# Patient Record
Sex: FEMALE | Race: WHITE | NOT HISPANIC OR LATINO
[De-identification: names, ages, dates, MRNs, and addresses within clinical notes are randomized per-mention and may not be internally consistent; named-entity substitution may affect disease eponyms.]

---

## 2017-03-22 ENCOUNTER — APPOINTMENT (OUTPATIENT)
Dept: OTOLARYNGOLOGY | Facility: CLINIC | Age: 77
End: 2017-03-22

## 2017-04-05 ENCOUNTER — APPOINTMENT (OUTPATIENT)
Dept: OTOLARYNGOLOGY | Facility: CLINIC | Age: 77
End: 2017-04-05

## 2017-05-03 ENCOUNTER — APPOINTMENT (OUTPATIENT)
Dept: OTOLARYNGOLOGY | Facility: CLINIC | Age: 77
End: 2017-05-03

## 2017-05-30 ENCOUNTER — OUTPATIENT (OUTPATIENT)
Dept: OUTPATIENT SERVICES | Facility: HOSPITAL | Age: 77
LOS: 1 days | Discharge: HOME | End: 2017-05-30

## 2017-05-30 ENCOUNTER — APPOINTMENT (OUTPATIENT)
Dept: OTOLARYNGOLOGY | Facility: AMBULATORY SURGERY CENTER | Age: 77
End: 2017-05-30

## 2017-06-28 DIAGNOSIS — J38.01 PARALYSIS OF VOCAL CORDS AND LARYNX, UNILATERAL: ICD-10-CM

## 2017-06-28 DIAGNOSIS — I48.91 UNSPECIFIED ATRIAL FIBRILLATION: ICD-10-CM

## 2017-06-28 DIAGNOSIS — Z79.01 LONG TERM (CURRENT) USE OF ANTICOAGULANTS: ICD-10-CM

## 2017-06-28 DIAGNOSIS — I10 ESSENTIAL (PRIMARY) HYPERTENSION: ICD-10-CM

## 2018-03-06 ENCOUNTER — APPOINTMENT (OUTPATIENT)
Dept: CARDIOLOGY | Facility: CLINIC | Age: 78
End: 2018-03-06

## 2018-03-06 VITALS — BODY MASS INDEX: 17.83 KG/M2 | WEIGHT: 107 LBS | HEIGHT: 65 IN

## 2018-03-06 VITALS — DIASTOLIC BLOOD PRESSURE: 70 MMHG | SYSTOLIC BLOOD PRESSURE: 118 MMHG

## 2018-03-08 RX ORDER — METOPROLOL TARTRATE 50 MG/1
50 TABLET, FILM COATED ORAL
Refills: 0 | Status: ACTIVE | COMMUNITY

## 2018-03-08 RX ORDER — ASPIRIN 81 MG/1
81 TABLET, COATED ORAL
Refills: 0 | Status: ACTIVE | COMMUNITY

## 2018-04-11 ENCOUNTER — CLINICAL ADVICE (OUTPATIENT)
Age: 78
End: 2018-04-11

## 2018-04-23 ENCOUNTER — APPOINTMENT (OUTPATIENT)
Dept: CARDIOLOGY | Facility: CLINIC | Age: 78
End: 2018-04-23

## 2018-04-23 VITALS
OXYGEN SATURATION: 98 % | DIASTOLIC BLOOD PRESSURE: 84 MMHG | HEART RATE: 76 BPM | BODY MASS INDEX: 17.14 KG/M2 | WEIGHT: 103 LBS | SYSTOLIC BLOOD PRESSURE: 130 MMHG

## 2018-06-25 ENCOUNTER — APPOINTMENT (OUTPATIENT)
Dept: CARDIOLOGY | Facility: CLINIC | Age: 78
End: 2018-06-25

## 2019-03-08 ENCOUNTER — APPOINTMENT (OUTPATIENT)
Dept: CARDIOLOGY | Facility: CLINIC | Age: 79
End: 2019-03-08
Payer: MEDICARE

## 2019-03-08 VITALS
BODY MASS INDEX: 17.83 KG/M2 | DIASTOLIC BLOOD PRESSURE: 70 MMHG | SYSTOLIC BLOOD PRESSURE: 120 MMHG | HEIGHT: 65 IN | WEIGHT: 107 LBS

## 2019-03-08 DIAGNOSIS — Z78.9 OTHER SPECIFIED HEALTH STATUS: ICD-10-CM

## 2019-03-08 PROCEDURE — 99214 OFFICE O/P EST MOD 30 MIN: CPT

## 2019-03-08 RX ORDER — LISINOPRIL 2.5 MG/1
2.5 TABLET ORAL
Qty: 30 | Refills: 2 | Status: DISCONTINUED | COMMUNITY
Start: 2018-04-23 | End: 2019-03-08

## 2019-03-08 NOTE — HISTORY OF PRESENT ILLNESS
[FreeTextEntry1] : hx of paroxysmal a fib\par  s/p aaa aorta and avr\par  hx hypothyroidism\par  c/o infrequent palpitations\par  no chest pain\par  mild dyspnoea

## 2019-03-08 NOTE — PHYSICAL EXAM
[Heart Rate And Rhythm] : heart rate and rhythm were normal [Heart Sounds] : normal S1 and S2 [Arterial Pulses Normal] : the arterial pulses were normal [Edema] : no peripheral edema present [FreeTextEntry1] : syst murmur at base

## 2019-03-08 NOTE — ASSESSMENT
[FreeTextEntry1] :  hx of afib \par  hx of avr and aorti replacement\par  c./o exertional dyspnoea\par  needs repeat echo to evaluate aortic valve\par  lv function

## 2019-03-27 ENCOUNTER — APPOINTMENT (OUTPATIENT)
Dept: CARDIOLOGY | Facility: CLINIC | Age: 79
End: 2019-03-27

## 2019-05-07 ENCOUNTER — APPOINTMENT (OUTPATIENT)
Dept: CARDIOLOGY | Facility: CLINIC | Age: 79
End: 2019-05-07
Payer: MEDICARE

## 2019-05-07 VITALS
SYSTOLIC BLOOD PRESSURE: 115 MMHG | BODY MASS INDEX: 17.49 KG/M2 | WEIGHT: 105 LBS | DIASTOLIC BLOOD PRESSURE: 65 MMHG | HEIGHT: 65 IN

## 2019-05-07 DIAGNOSIS — I42.9 CARDIOMYOPATHY, UNSPECIFIED: ICD-10-CM

## 2019-05-07 PROCEDURE — 93000 ELECTROCARDIOGRAM COMPLETE: CPT

## 2019-05-07 PROCEDURE — 99213 OFFICE O/P EST LOW 20 MIN: CPT

## 2019-05-07 NOTE — HISTORY OF PRESENT ILLNESS
[FreeTextEntry1] :  pt s/pavr and aortic aneurysm sergery in [place] 5 yrs ago\par  feeling well but weak\par  no palpitations\par  meds reviewed

## 2019-05-07 NOTE — PHYSICAL EXAM
[General Appearance - Well Developed] : well developed [Well Groomed] : well groomed [Normal Appearance] : normal appearance [No Deformities] : no deformities [General Appearance - Well Nourished] : well nourished [General Appearance - In No Acute Distress] : no acute distress [Heart Rate And Rhythm] : heart rate and rhythm were normal [Edema] : no peripheral edema present

## 2019-05-07 NOTE — ASSESSMENT
[FreeTextEntry1] :  no chf\par  no arrhythmias noted\par  e k g shows nsr lahb no acute st t chages seen\par  needs abd sono to r/o aaa

## 2019-06-21 ENCOUNTER — APPOINTMENT (OUTPATIENT)
Dept: CARDIOLOGY | Facility: CLINIC | Age: 79
End: 2019-06-21

## 2019-07-03 ENCOUNTER — MEDICATION RENEWAL (OUTPATIENT)
Age: 79
End: 2019-07-03

## 2019-08-15 ENCOUNTER — APPOINTMENT (OUTPATIENT)
Dept: CARDIOLOGY | Facility: CLINIC | Age: 79
End: 2019-08-15
Payer: MEDICARE

## 2019-08-15 VITALS
WEIGHT: 101 LBS | HEIGHT: 65 IN | DIASTOLIC BLOOD PRESSURE: 70 MMHG | SYSTOLIC BLOOD PRESSURE: 120 MMHG | BODY MASS INDEX: 16.83 KG/M2

## 2019-08-15 DIAGNOSIS — I47.2 VENTRICULAR TACHYCARDIA: ICD-10-CM

## 2019-08-15 PROCEDURE — 99213 OFFICE O/P EST LOW 20 MIN: CPT

## 2019-08-15 NOTE — ASSESSMENT
[FreeTextEntry1] : cardiac ststus is stable\par  no arrhythmias noted\par  meds renewed\par  labs to be done by yazmin hernandez

## 2019-08-15 NOTE — HISTORY OF PRESENT ILLNESS
[FreeTextEntry1] : pt is feeling better\par  no chest pains\par  no palpitations\par  meds reviewed\par  echo done few weeks ago reviewed with pt normal lvef and prosthetic aortic vave functioning well

## 2019-08-15 NOTE — PHYSICAL EXAM
[General Appearance - Well Developed] : well developed [Normal Appearance] : normal appearance [General Appearance - Well Nourished] : well nourished [Well Groomed] : well groomed [No Deformities] : no deformities [General Appearance - In No Acute Distress] : no acute distress [Heart Rate And Rhythm] : heart rate and rhythm were normal [Veins - Varicosity Changes] : no varicosital changes were noted in the lower extremities [Edema] : no peripheral edema present [FreeTextEntry1] : syst murmur at base radiates to apex

## 2019-11-26 ENCOUNTER — RX RENEWAL (OUTPATIENT)
Age: 79
End: 2019-11-26

## 2020-01-09 ENCOUNTER — APPOINTMENT (OUTPATIENT)
Dept: CARDIOLOGY | Facility: CLINIC | Age: 80
End: 2020-01-09
Payer: MEDICARE

## 2020-01-09 VITALS
HEART RATE: 60 BPM | SYSTOLIC BLOOD PRESSURE: 138 MMHG | BODY MASS INDEX: 16.83 KG/M2 | HEIGHT: 65 IN | WEIGHT: 101 LBS | DIASTOLIC BLOOD PRESSURE: 80 MMHG

## 2020-01-09 PROCEDURE — 99213 OFFICE O/P EST LOW 20 MIN: CPT

## 2020-01-09 PROCEDURE — 93000 ELECTROCARDIOGRAM COMPLETE: CPT

## 2020-01-09 RX ORDER — ROSUVASTATIN CALCIUM 10 MG/1
10 TABLET, FILM COATED ORAL DAILY
Qty: 90 | Refills: 3 | Status: ACTIVE | COMMUNITY
Start: 2020-01-09 | End: 1900-01-01

## 2020-01-09 NOTE — PHYSICAL EXAM
[General Appearance - Well Developed] : well developed [Normal Appearance] : normal appearance [General Appearance - Well Nourished] : well nourished [Well Groomed] : well groomed [General Appearance - In No Acute Distress] : no acute distress [No Deformities] : no deformities [Heart Rate And Rhythm] : heart rate and rhythm were normal [Heart Sounds] : normal S1 and S2 [FreeTextEntry1] : syst murmur at base and apex

## 2020-01-09 NOTE — ASSESSMENT
[FreeTextEntry1] :  s/p aotoplasty and avr \par  will get echo\par   e k g nsr lad no arrhythmias noted\par  need blood work

## 2020-01-09 NOTE — HISTORY OF PRESENT ILLNESS
[FreeTextEntry1] : pt is feeling better\par  no chest pains\par  no palpitations\par  no exertional dyspnoea\par  meds reviewed

## 2020-01-23 ENCOUNTER — APPOINTMENT (OUTPATIENT)
Dept: CARDIOLOGY | Facility: CLINIC | Age: 80
End: 2020-01-23
Payer: MEDICARE

## 2020-01-23 PROCEDURE — 93306 TTE W/DOPPLER COMPLETE: CPT

## 2020-01-27 ENCOUNTER — APPOINTMENT (OUTPATIENT)
Dept: NEUROLOGY | Facility: CLINIC | Age: 80
End: 2020-01-27
Payer: MEDICARE

## 2020-01-27 VITALS
DIASTOLIC BLOOD PRESSURE: 82 MMHG | SYSTOLIC BLOOD PRESSURE: 146 MMHG | HEIGHT: 66 IN | HEART RATE: 64 BPM | BODY MASS INDEX: 15.93 KG/M2 | WEIGHT: 99.1 LBS | TEMPERATURE: 97.2 F | OXYGEN SATURATION: 98 %

## 2020-01-27 DIAGNOSIS — R41.3 OTHER AMNESIA: ICD-10-CM

## 2020-01-27 PROCEDURE — 99204 OFFICE O/P NEW MOD 45 MIN: CPT

## 2020-01-27 NOTE — ASSESSMENT
[FreeTextEntry1] : 80 yo RHF w/ h/o HTN, CAD, DLD, hypothyroidism currently with episodes of repetition here for cognitive evaluation.  Pt currently nonfocal with normal MOCA score.  Suspect episodes of repetition likely due to distraction since needs multiple attempts on 5 object recall during MOCA testing.  No obvious dementia at this time but will do workup for reversible causes. \par \par Plan:\par - MRI Brain NC\par - check b/w\par - if w/u negative, may f/u in 6 months\par - will consider repeat MOCA in 12 months

## 2020-01-27 NOTE — PHYSICAL EXAM
[FreeTextEntry1] : Physical examination:  \par General:   The patient is pleasant, cooperative, well dressed and in no acute distress.  Appearance is consistent with chronologic age.  No abnormal facies.\par Neurologic examination:  The patient is oriented to person, place, time and date.   Remote and recent memory is normal.   Fund of knowledge is intact and normal.  Language with normal repetition, comprehension and naming.  Nondysarthric.   \par Cranial nerves examination: intact VA, VFF.  EOMI w/o nystagmus, skew or reported double vision.  PERRL.  No ptosis/weakness of eyelid closure.  Facial sensation is normal with normal bite.  No facial asymmetry.  Hearing grossly intact b/l.  Palate elevates midline.  Neck flexion/extension, SCM/Trap strength normal.  Tongue midline with full resistance.  \par Motor examination:   Normal tone, bulk and range of motion.  No tenderness, twitching, tremors or involuntary movements.      \par Formal Muscle Strength Testing: (MRC grade R/L) 5/5 RON, BB, TR, WF, WE, FF, FE; 5/5 ILP, QDS, HS, DF, PF.  Arises from sitting/squatting wnl.  Toe/heel walks.  \par Reflexes:   2+ b/l pectoralis, biceps, triceps, brachioradialis, patella and Achilles.  Plantar response downgoing b/l.  Jaw jerk, Justin, clonus absent.  \par Sensory examination:   Intact to light touch and pinprick, pain, temperature and proprioception and vibration in all extremities.    \par Cerebellum:   FTN/HKS intact with normal LOUISE in all limbs.   Gait is narrow based and normal with normal tandem.  Romberg (-).  No dysmetria or dysdiadokinesia.       \par \par MOCA 26 (w/ repeated testing for 5 object recall)

## 2020-01-27 NOTE — HISTORY OF PRESENT ILLNESS
[FreeTextEntry1] : This is a 80 yo RHF accompanied by her friend presenting with forgetfulness noticed by her daughter.  According to her friend, family has noted that she has been repeating herself lately over the last few months.  Denies any forgetfulness with names or appointments.  Continues to pay bills and no decline in ADLs.  Doesn't get lost in neighborhoods or while driving.  Has not had recent MVA.  Is otherwise in her usual state of health.  Pt denies any problems with memory or function but has noted occasional repetition in though process.  Denies any hearing problems.  No weakness/numbness or focal deficits.

## 2020-05-30 ENCOUNTER — RX RENEWAL (OUTPATIENT)
Age: 80
End: 2020-05-30

## 2020-06-03 RX ORDER — LEVOTHYROXINE SODIUM 0.05 MG/1
50 TABLET ORAL DAILY
Qty: 90 | Refills: 2 | Status: ACTIVE | COMMUNITY
Start: 2020-06-03 | End: 1900-01-01

## 2020-06-04 ENCOUNTER — APPOINTMENT (OUTPATIENT)
Dept: CARDIOLOGY | Facility: CLINIC | Age: 80
End: 2020-06-04
Payer: MEDICARE

## 2020-06-04 PROCEDURE — 99441: CPT | Mod: 95

## 2020-06-04 NOTE — HISTORY OF PRESENT ILLNESS
[FreeTextEntry1] : pt called her at home and gave consent for telephone visit due to covid 19\par  pt s/p avr and aneurysm surgery\par  pt is c/o weakness but denies any chest pains,dyspnoea or palpitations\par  meds reviewed with pt and meds renwewed yesterday\par  no chf  sx\par

## 2020-06-04 NOTE — ASSESSMENT
[FreeTextEntry1] : pt is feeling better\par  cardiac status is stable\par  meds renewed\par  total time spent was 10 mnts

## 2020-06-15 ENCOUNTER — RX RENEWAL (OUTPATIENT)
Age: 80
End: 2020-06-15

## 2020-08-12 ENCOUNTER — APPOINTMENT (OUTPATIENT)
Dept: NEUROLOGY | Facility: CLINIC | Age: 80
End: 2020-08-12

## 2020-11-03 ENCOUNTER — RX RENEWAL (OUTPATIENT)
Age: 80
End: 2020-11-03

## 2020-11-12 ENCOUNTER — APPOINTMENT (OUTPATIENT)
Dept: CARDIOLOGY | Facility: CLINIC | Age: 80
End: 2020-11-12
Payer: MEDICARE

## 2020-11-12 VITALS
BODY MASS INDEX: 15.33 KG/M2 | SYSTOLIC BLOOD PRESSURE: 140 MMHG | DIASTOLIC BLOOD PRESSURE: 65 MMHG | WEIGHT: 95 LBS | TEMPERATURE: 96.1 F

## 2020-11-12 LAB
ALBUMIN SERPL ELPH-MCNC: 4.5 G/DL
ALP BLD-CCNC: 64 U/L
ALT SERPL-CCNC: 8 U/L
ANION GAP SERPL CALC-SCNC: 12 MMOL/L
AST SERPL-CCNC: 16 U/L
BASOPHILS # BLD AUTO: 0.05 K/UL
BASOPHILS NFR BLD AUTO: 0.7 %
BILIRUB SERPL-MCNC: 0.5 MG/DL
BUN SERPL-MCNC: 12 MG/DL
CALCIUM SERPL-MCNC: 9.1 MG/DL
CHLORIDE SERPL-SCNC: 101 MMOL/L
CHOLEST SERPL-MCNC: 215 MG/DL
CO2 SERPL-SCNC: 30 MMOL/L
CREAT SERPL-MCNC: 0.6 MG/DL
EOSINOPHIL # BLD AUTO: 0.32 K/UL
EOSINOPHIL NFR BLD AUTO: 4.5 %
GLUCOSE SERPL-MCNC: 81 MG/DL
HCT VFR BLD CALC: 44.1 %
HDLC SERPL-MCNC: 75 MG/DL
HGB BLD-MCNC: 13.9 G/DL
IMM GRANULOCYTES NFR BLD AUTO: 0.1 %
LDLC SERPL CALC-MCNC: 127 MG/DL
LYMPHOCYTES # BLD AUTO: 1.28 K/UL
LYMPHOCYTES NFR BLD AUTO: 18.1 %
MAN DIFF?: NORMAL
MCHC RBC-ENTMCNC: 30.5 PG
MCHC RBC-ENTMCNC: 31.5 G/DL
MCV RBC AUTO: 96.7 FL
MONOCYTES # BLD AUTO: 0.55 K/UL
MONOCYTES NFR BLD AUTO: 7.8 %
NEUTROPHILS # BLD AUTO: 4.88 K/UL
NEUTROPHILS NFR BLD AUTO: 68.8 %
NONHDLC SERPL-MCNC: 140 MG/DL
PLATELET # BLD AUTO: 229 K/UL
POTASSIUM SERPL-SCNC: 4.2 MMOL/L
PROT SERPL-MCNC: 6.9 G/DL
RBC # BLD: 4.56 M/UL
RBC # FLD: 13.5 %
SODIUM SERPL-SCNC: 143 MMOL/L
TRIGL SERPL-MCNC: 89 MG/DL
WBC # FLD AUTO: 7.09 K/UL

## 2020-11-12 PROCEDURE — 99213 OFFICE O/P EST LOW 20 MIN: CPT

## 2020-11-12 PROCEDURE — 93000 ELECTROCARDIOGRAM COMPLETE: CPT

## 2020-11-12 RX ORDER — TRIAMCINOLONE ACETONIDE 0.5 MG/G
0.05 OINTMENT TOPICAL TWICE DAILY
Qty: 1 | Refills: 3 | Status: ACTIVE | COMMUNITY
Start: 2020-11-12 | End: 1900-01-01

## 2020-11-12 NOTE — ASSESSMENT
[FreeTextEntry1] : marshall gonzales shows nsr occ p v c s noted\par \par  bp 120/80 well controlled\par meds reviewed\par  need blood work

## 2020-11-12 NOTE — HISTORY OF PRESENT ILLNESS
[FreeTextEntry1] : pt is feeling well\par  no chest pain\par  no dyspnoea\par  no palpitations\par  s/p avr and aortic reconstruction\par  meds reviewed

## 2020-11-12 NOTE — REASON FOR VISIT
[Follow-Up - Clinic] : a clinic follow-up of [Abnormal ECG] : an abnormal ECG [Hyperlipidemia] : hyperlipidemia [Hypertension] : hypertension [Medication Management] : Medication management [Prosthetic Valve] : a prosthetic valve

## 2020-11-13 LAB — TSH SERPL-ACNC: 3.42 UIU/ML

## 2020-12-14 ENCOUNTER — RX RENEWAL (OUTPATIENT)
Age: 80
End: 2020-12-14

## 2020-12-14 RX ORDER — DRONEDARONE 400 MG/1
400 TABLET, FILM COATED ORAL TWICE DAILY
Qty: 180 | Refills: 3 | Status: ACTIVE | COMMUNITY
Start: 2020-12-14 | End: 1900-01-01

## 2021-01-19 ENCOUNTER — RX RENEWAL (OUTPATIENT)
Age: 81
End: 2021-01-19

## 2021-01-19 RX ORDER — METOPROLOL SUCCINATE 50 MG/1
50 TABLET, EXTENDED RELEASE ORAL DAILY
Qty: 90 | Refills: 3 | Status: ACTIVE | COMMUNITY
Start: 2019-07-08 | End: 1900-01-01

## 2021-02-18 ENCOUNTER — APPOINTMENT (OUTPATIENT)
Dept: CARDIOLOGY | Facility: CLINIC | Age: 81
End: 2021-02-18
Payer: MEDICARE

## 2021-02-18 DIAGNOSIS — E03.9 HYPOTHYROIDISM, UNSPECIFIED: ICD-10-CM

## 2021-02-18 PROCEDURE — 99442: CPT | Mod: 95

## 2021-02-18 NOTE — HISTORY OF PRESENT ILLNESS
[FreeTextEntry1] : pt called her at home and gave consent for telephone visit due to covid\par  pt is feeling better\par  no anginal sxc\par  exertional dyspnoea is better and no pedal edema\par  meds reviewd and she needs synthroid rx renewed\par

## 2021-02-18 NOTE — REASON FOR VISIT
[Follow-Up - Clinic] : a clinic follow-up of [Hyperlipidemia] : hyperlipidemia [Hypertension] : hypertension [Medication Management] : Medication management [Prosthetic Valve] : a prosthetic valve

## 2021-02-18 NOTE — ASSESSMENT
[FreeTextEntry1] : pt is not able to come to office today due to covid\par  cardiac status is stable and no cardiac sx\par  no chf sx\par  will renew meds as she requested\par  total time spent was 15 mnts

## 2021-06-21 ENCOUNTER — APPOINTMENT (OUTPATIENT)
Dept: VASCULAR SURGERY | Facility: CLINIC | Age: 81
End: 2021-06-21
Payer: MEDICARE

## 2021-06-21 VITALS
DIASTOLIC BLOOD PRESSURE: 72 MMHG | HEIGHT: 66 IN | TEMPERATURE: 97.6 F | BODY MASS INDEX: 14.79 KG/M2 | WEIGHT: 92 LBS | HEART RATE: 66 BPM | SYSTOLIC BLOOD PRESSURE: 148 MMHG

## 2021-06-21 PROCEDURE — 99204 OFFICE O/P NEW MOD 45 MIN: CPT

## 2021-06-21 PROCEDURE — 93978 VASCULAR STUDY: CPT

## 2021-06-21 NOTE — HISTORY OF PRESENT ILLNESS
[FreeTextEntry1] : 79 y/o female with h/o ascending aortic aneurysm repair and aortic valve replacement in 2015 and then in 2016 at [facility]. She has h/o AAA and last duplex March 2020, it measured 4.6 cm. Denies any abdominal pain or back pain.

## 2021-06-21 NOTE — DATA REVIEWED
[FreeTextEntry1] : I performed an arterial duplex which was medically necessary to evaluate for size of AAA. It showed AAA at 3.5 cm.\par

## 2021-06-21 NOTE — END OF VISIT
[FreeTextEntry3] : Patient is 80 years old female, referred to our clinic for evaluation of AAA. \par No chest, back or abdominal pain. 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

## 2021-06-21 NOTE — CONSULT LETTER
[Dear  ___] : Dear  [unfilled], [Consult Letter:] : I had the pleasure of evaluating your patient, [unfilled]. [Please see my note below.] : Please see my note below. [FreeTextEntry2] : Dear Dr. Malinda Sanford,

## 2021-06-21 NOTE — ASSESSMENT
[FreeTextEntry1] : 79 y/o female with aortic aneurysm, h/o open repair of ascending aortic aneurysm and aortic valve replacement in 2015, 2016 at [facility]. Duplex with PMD one year ago (March 2020) showed AAA at 4.6 x 4.5 cm.\par \par Aortic duplex today showed AAA measuring 3.5 cm.\par \par I would like to obtain a CTA of chest, abdomen and pelvis for further evaluation. I will see her back after the CTA. I will also try to obtain medical records and operative report from [facility].

## 2021-06-22 LAB
BUN SERPL-MCNC: 16 MG/DL
CREAT SERPL-MCNC: 0.7 MG/DL

## 2021-07-12 ENCOUNTER — OUTPATIENT (OUTPATIENT)
Dept: OUTPATIENT SERVICES | Facility: HOSPITAL | Age: 81
LOS: 1 days | Discharge: HOME | End: 2021-07-12
Payer: MEDICARE

## 2021-07-12 ENCOUNTER — RESULT REVIEW (OUTPATIENT)
Age: 81
End: 2021-07-12

## 2021-07-12 DIAGNOSIS — I71.2 THORACIC AORTIC ANEURYSM, WITHOUT RUPTURE: ICD-10-CM

## 2021-07-12 DIAGNOSIS — I71.4 ABDOMINAL AORTIC ANEURYSM, WITHOUT RUPTURE: ICD-10-CM

## 2021-07-12 PROCEDURE — 74174 CTA ABD&PLVS W/CONTRAST: CPT | Mod: 26,MH

## 2021-07-12 PROCEDURE — 71275 CT ANGIOGRAPHY CHEST: CPT | Mod: 26,MH

## 2021-08-02 ENCOUNTER — APPOINTMENT (OUTPATIENT)
Dept: CARDIOLOGY | Facility: CLINIC | Age: 81
End: 2021-08-02
Payer: MEDICARE

## 2021-08-02 VITALS
TEMPERATURE: 98.1 F | BODY MASS INDEX: 15.43 KG/M2 | SYSTOLIC BLOOD PRESSURE: 130 MMHG | HEART RATE: 66 BPM | WEIGHT: 96 LBS | HEIGHT: 66 IN | DIASTOLIC BLOOD PRESSURE: 70 MMHG

## 2021-08-02 DIAGNOSIS — Z95.3 PRESENCE OF XENOGENIC HEART VALVE: ICD-10-CM

## 2021-08-02 DIAGNOSIS — I71.2 THORACIC AORTIC ANEURYSM, W/OUT RUPTURE: ICD-10-CM

## 2021-08-02 PROCEDURE — 93000 ELECTROCARDIOGRAM COMPLETE: CPT

## 2021-08-02 PROCEDURE — 99213 OFFICE O/P EST LOW 20 MIN: CPT

## 2021-08-02 RX ORDER — LEVOTHYROXINE SODIUM 0.07 MG/1
75 TABLET ORAL DAILY
Qty: 90 | Refills: 2 | Status: DISCONTINUED | COMMUNITY
Start: 2021-02-18 | End: 2021-08-02

## 2021-08-02 RX ORDER — LEVOTHYROXINE SODIUM 0.07 MG/1
75 TABLET ORAL DAILY
Qty: 90 | Refills: 1 | Status: DISCONTINUED | COMMUNITY
Start: 2020-01-09 | End: 2021-08-02

## 2021-08-02 NOTE — HISTORY OF PRESENT ILLNESS
[FreeTextEntry1] :  pt is scheduled for endovascular aneurysm repair of thoracic aorta\par  pt denies any anginal sx or palpitations\par  no change in exertional dyspnoea\par  meds reviewed\par

## 2021-08-02 NOTE — PHYSICAL EXAM

## 2021-08-02 NOTE — ASSESSMENT
[FreeTextEntry1] : pt is feeling well\par  no anginal sx\par  no evidence for chf\par  meds reviewed\par  e k g nsr occ p v cs noted\par  pt cardiac ststus is stable \par  pt istable and cleared for T E [facility]\par

## 2021-08-02 NOTE — PHYSICAL EXAM

## 2021-08-02 NOTE — REASON FOR VISIT
[Symptom and Test Evaluation] : symptom and test evaluation [Arrhythmia/ECG Abnorrmalities] : arrhythmia/ECG abnormalities [Structural Heart and Valve Disease] : structural heart and valve disease [Hyperlipidemia] : hyperlipidemia [Hypertension] : hypertension

## 2021-08-24 ENCOUNTER — APPOINTMENT (OUTPATIENT)
Dept: CARDIOLOGY | Facility: CLINIC | Age: 81
End: 2021-08-24
Payer: MEDICARE

## 2021-08-24 VITALS
WEIGHT: 95 LBS | BODY MASS INDEX: 15.27 KG/M2 | HEIGHT: 66 IN | DIASTOLIC BLOOD PRESSURE: 60 MMHG | TEMPERATURE: 98.1 F | HEART RATE: 70 BPM | SYSTOLIC BLOOD PRESSURE: 125 MMHG

## 2021-08-24 DIAGNOSIS — I71.4 ABDOMINAL AORTIC ANEURYSM, W/OUT RUPTURE: ICD-10-CM

## 2021-08-24 PROCEDURE — 93000 ELECTROCARDIOGRAM COMPLETE: CPT

## 2021-08-24 PROCEDURE — 99213 OFFICE O/P EST LOW 20 MIN: CPT

## 2021-08-24 RX ORDER — TRIAMCINOLONE ACETONIDE 1 MG/G
0.1 OINTMENT TOPICAL
Qty: 80 | Refills: 0 | Status: ACTIVE | COMMUNITY
Start: 2021-04-16

## 2021-08-24 RX ORDER — DEXAMETHASONE 2 MG/1
2 TABLET ORAL
Qty: 30 | Refills: 0 | Status: ACTIVE | COMMUNITY
Start: 2021-04-16

## 2021-08-24 RX ORDER — APIXABAN 2.5 MG/1
2.5 TABLET, FILM COATED ORAL
Qty: 180 | Refills: 2 | Status: ACTIVE | COMMUNITY
Start: 2021-08-24 | End: 1900-01-01

## 2021-08-24 RX ORDER — CLOBETASOL PROPIONATE 0.5 MG/G
0.05 CREAM TOPICAL
Qty: 120 | Refills: 0 | Status: ACTIVE | COMMUNITY
Start: 2021-06-09

## 2021-08-24 NOTE — ASSESSMENT
[FreeTextEntry1] :  marshall peck g nsr  no a fib \par  meds reviewed and renewed\par  pao2 93%\par

## 2021-08-24 NOTE — HISTORY OF PRESENT ILLNESS
[FreeTextEntry1] : pt had  endovascular repair of aortic aneurysm at [facility]\par  post op pt developed a fib on Eliquis\par  no bleeding issues\par  no anginal sx no dyspnoea\par  meds reviewed\par  rt groin incision looks clean and healing well\par

## 2021-11-08 ENCOUNTER — APPOINTMENT (OUTPATIENT)
Dept: CARDIOLOGY | Facility: CLINIC | Age: 81
End: 2021-11-08

## 2021-12-02 ENCOUNTER — APPOINTMENT (OUTPATIENT)
Dept: CARDIOLOGY | Facility: CLINIC | Age: 81
End: 2021-12-02
Payer: MEDICARE

## 2021-12-02 VITALS
WEIGHT: 95 LBS | HEIGHT: 66 IN | BODY MASS INDEX: 15.27 KG/M2 | SYSTOLIC BLOOD PRESSURE: 118 MMHG | DIASTOLIC BLOOD PRESSURE: 50 MMHG | TEMPERATURE: 96.6 F | HEART RATE: 65 BPM

## 2021-12-02 DIAGNOSIS — E78.5 HYPERLIPIDEMIA, UNSPECIFIED: ICD-10-CM

## 2021-12-02 DIAGNOSIS — I48.0 PAROXYSMAL ATRIAL FIBRILLATION: ICD-10-CM

## 2021-12-02 DIAGNOSIS — I10 ESSENTIAL (PRIMARY) HYPERTENSION: ICD-10-CM

## 2021-12-02 PROCEDURE — 93000 ELECTROCARDIOGRAM COMPLETE: CPT

## 2021-12-02 PROCEDURE — 99213 OFFICE O/P EST LOW 20 MIN: CPT

## 2021-12-02 NOTE — ASSESSMENT
[FreeTextEntry1] :  marshall gonzales nsr lad no arrhythmia\par  no chf\par  meds reviewed with pt and her daughter\par

## 2021-12-02 NOTE — HISTORY OF PRESENT ILLNESS
[FreeTextEntry1] : pt had aortic disection at [facility] \par  pt is feeling well\par  no chest pains\par  no palpitations \par  no chest pains\par   meds reviewed\par  on Eliquis no bleeding problem\par

## 2022-02-18 ENCOUNTER — EMERGENCY (EMERGENCY)
Facility: HOSPITAL | Age: 82
LOS: 0 days | Discharge: HOME | End: 2022-02-18
Attending: EMERGENCY MEDICINE | Admitting: EMERGENCY MEDICINE
Payer: MEDICARE

## 2022-02-18 VITALS
OXYGEN SATURATION: 99 % | WEIGHT: 93.04 LBS | DIASTOLIC BLOOD PRESSURE: 61 MMHG | RESPIRATION RATE: 18 BRPM | TEMPERATURE: 98 F | HEART RATE: 69 BPM | SYSTOLIC BLOOD PRESSURE: 141 MMHG | HEIGHT: 66 IN

## 2022-02-18 DIAGNOSIS — T38.1X1A POISONING BY THYROID HORMONES AND SUBSTITUTES, ACCIDENTAL (UNINTENTIONAL), INITIAL ENCOUNTER: ICD-10-CM

## 2022-02-18 DIAGNOSIS — I10 ESSENTIAL (PRIMARY) HYPERTENSION: ICD-10-CM

## 2022-02-18 DIAGNOSIS — F03.90 UNSPECIFIED DEMENTIA, UNSPECIFIED SEVERITY, WITHOUT BEHAVIORAL DISTURBANCE, PSYCHOTIC DISTURBANCE, MOOD DISTURBANCE, AND ANXIETY: ICD-10-CM

## 2022-02-18 DIAGNOSIS — T46.6X1A POISONING BY ANTIHYPERLIPIDEMIC AND ANTIARTERIOSCLEROTIC DRUGS, ACCIDENTAL (UNINTENTIONAL), INITIAL ENCOUNTER: ICD-10-CM

## 2022-02-18 DIAGNOSIS — R53.83 OTHER FATIGUE: ICD-10-CM

## 2022-02-18 DIAGNOSIS — T39.011A POISONING BY ASPIRIN, ACCIDENTAL (UNINTENTIONAL), INITIAL ENCOUNTER: ICD-10-CM
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
## 2022-02-18 DIAGNOSIS — Z20.822 CONTACT WITH AND (SUSPECTED) EXPOSURE TO COVID-19: ICD-10-CM

## 2022-02-18 DIAGNOSIS — T44.7X1A POISONING BY BETA-ADRENORECEPTOR ANTAGONISTS, ACCIDENTAL (UNINTENTIONAL), INITIAL ENCOUNTER: ICD-10-CM

## 2022-02-18 DIAGNOSIS — T50.911A POISONING BY MULTIPLE UNSPECIFIED DRUGS, MEDICAMENTS AND BIOLOGICAL SUBSTANCES, ACCIDENTAL (UNINTENTIONAL), INITIAL ENCOUNTER: ICD-10-CM

## 2022-02-18 DIAGNOSIS — T43.021A POISONING BY TETRACYCLIC ANTIDEPRESSANTS, ACCIDENTAL (UNINTENTIONAL), INITIAL ENCOUNTER: ICD-10-CM

## 2022-02-18 DIAGNOSIS — E78.5 HYPERLIPIDEMIA, UNSPECIFIED: ICD-10-CM

## 2022-02-18 DIAGNOSIS — T50.991A POISONING BY OTHER DRUGS, MEDICAMENTS AND BIOLOGICAL SUBSTANCES, ACCIDENTAL (UNINTENTIONAL), INITIAL ENCOUNTER: ICD-10-CM

## 2022-02-18 DIAGNOSIS — E03.9 HYPOTHYROIDISM, UNSPECIFIED: ICD-10-CM

## 2022-02-18 DIAGNOSIS — T45.511A POISONING BY ANTICOAGULANTS, ACCIDENTAL (UNINTENTIONAL), INITIAL ENCOUNTER: ICD-10-CM

## 2022-02-18 DIAGNOSIS — T50.901A POISONING BY UNSPECIFIED DRUGS, MEDICAMENTS AND BIOLOGICAL SUBSTANCES, ACCIDENTAL (UNINTENTIONAL), INITIAL ENCOUNTER: ICD-10-CM

## 2022-02-18 DIAGNOSIS — N39.0 URINARY TRACT INFECTION, SITE NOT SPECIFIED: ICD-10-CM

## 2022-02-18 LAB
ALBUMIN SERPL ELPH-MCNC: 3.8 G/DL — SIGNIFICANT CHANGE UP (ref 3.5–5.2)
ALP SERPL-CCNC: 70 U/L — SIGNIFICANT CHANGE UP (ref 30–115)
ALT FLD-CCNC: 11 U/L — SIGNIFICANT CHANGE UP (ref 0–41)
ANION GAP SERPL CALC-SCNC: 14 MMOL/L — SIGNIFICANT CHANGE UP (ref 7–14)
APAP SERPL-MCNC: <5 UG/ML — LOW (ref 10–30)
APPEARANCE UR: CLEAR — SIGNIFICANT CHANGE UP
AST SERPL-CCNC: 23 U/L — SIGNIFICANT CHANGE UP (ref 0–41)
BACTERIA # UR AUTO: NEGATIVE — SIGNIFICANT CHANGE UP
BASOPHILS # BLD AUTO: 0.05 K/UL — SIGNIFICANT CHANGE UP (ref 0–0.2)
BASOPHILS NFR BLD AUTO: 0.6 % — SIGNIFICANT CHANGE UP (ref 0–1)
BILIRUB SERPL-MCNC: 0.3 MG/DL — SIGNIFICANT CHANGE UP (ref 0.2–1.2)
BILIRUB UR-MCNC: NEGATIVE — SIGNIFICANT CHANGE UP
BUN SERPL-MCNC: 15 MG/DL — SIGNIFICANT CHANGE UP (ref 10–20)
CALCIUM SERPL-MCNC: 8.9 MG/DL — SIGNIFICANT CHANGE UP (ref 8.5–10.1)
CHLORIDE SERPL-SCNC: 101 MMOL/L — SIGNIFICANT CHANGE UP (ref 98–110)
CO2 SERPL-SCNC: 25 MMOL/L — SIGNIFICANT CHANGE UP (ref 17–32)
COLOR SPEC: COLORLESS — SIGNIFICANT CHANGE UP
CREAT SERPL-MCNC: 0.7 MG/DL — SIGNIFICANT CHANGE UP (ref 0.7–1.5)
DIFF PNL FLD: NEGATIVE — SIGNIFICANT CHANGE UP
EOSINOPHIL # BLD AUTO: 0.69 K/UL — SIGNIFICANT CHANGE UP (ref 0–0.7)
EOSINOPHIL NFR BLD AUTO: 8.2 % — HIGH (ref 0–8)
EPI CELLS # UR: 1 /HPF — SIGNIFICANT CHANGE UP (ref 0–5)
ETHANOL SERPL-MCNC: <10 MG/DL — SIGNIFICANT CHANGE UP
GLUCOSE SERPL-MCNC: 99 MG/DL — SIGNIFICANT CHANGE UP (ref 70–99)
GLUCOSE UR QL: NEGATIVE — SIGNIFICANT CHANGE UP
HCT VFR BLD CALC: 42.2 % — SIGNIFICANT CHANGE UP (ref 37–47)
HGB BLD-MCNC: 13.3 G/DL — SIGNIFICANT CHANGE UP (ref 12–16)
HYALINE CASTS # UR AUTO: 0 /LPF — SIGNIFICANT CHANGE UP (ref 0–7)
IMM GRANULOCYTES NFR BLD AUTO: 0.4 % — HIGH (ref 0.1–0.3)
KETONES UR-MCNC: NEGATIVE — SIGNIFICANT CHANGE UP
LEUKOCYTE ESTERASE UR-ACNC: ABNORMAL
LYMPHOCYTES # BLD AUTO: 1.01 K/UL — LOW (ref 1.2–3.4)
LYMPHOCYTES # BLD AUTO: 12 % — LOW (ref 20.5–51.1)
MAGNESIUM SERPL-MCNC: 2.3 MG/DL — SIGNIFICANT CHANGE UP (ref 1.8–2.4)
MCHC RBC-ENTMCNC: 29.2 PG — SIGNIFICANT CHANGE UP (ref 27–31)
MCHC RBC-ENTMCNC: 31.5 G/DL — LOW (ref 32–37)
MCV RBC AUTO: 92.5 FL — SIGNIFICANT CHANGE UP (ref 81–99)
MONOCYTES # BLD AUTO: 0.61 K/UL — HIGH (ref 0.1–0.6)
MONOCYTES NFR BLD AUTO: 7.2 % — SIGNIFICANT CHANGE UP (ref 1.7–9.3)
NEUTROPHILS # BLD AUTO: 6.03 K/UL — SIGNIFICANT CHANGE UP (ref 1.4–6.5)
NEUTROPHILS NFR BLD AUTO: 71.6 % — SIGNIFICANT CHANGE UP (ref 42.2–75.2)
NITRITE UR-MCNC: NEGATIVE — SIGNIFICANT CHANGE UP
NRBC # BLD: 0 /100 WBCS — SIGNIFICANT CHANGE UP (ref 0–0)
PH UR: 7 — SIGNIFICANT CHANGE UP (ref 5–8)
PLATELET # BLD AUTO: 207 K/UL — SIGNIFICANT CHANGE UP (ref 130–400)
POTASSIUM SERPL-MCNC: 5 MMOL/L — SIGNIFICANT CHANGE UP (ref 3.5–5)
POTASSIUM SERPL-SCNC: 5 MMOL/L — SIGNIFICANT CHANGE UP (ref 3.5–5)
PROT SERPL-MCNC: 6.9 G/DL — SIGNIFICANT CHANGE UP (ref 6–8)
PROT UR-MCNC: NEGATIVE — SIGNIFICANT CHANGE UP
RBC # BLD: 4.56 M/UL — SIGNIFICANT CHANGE UP (ref 4.2–5.4)
RBC # FLD: 13.6 % — SIGNIFICANT CHANGE UP (ref 11.5–14.5)
RBC CASTS # UR COMP ASSIST: 1 /HPF — SIGNIFICANT CHANGE UP (ref 0–4)
SALICYLATES SERPL-MCNC: <0.3 MG/DL — LOW (ref 4–30)
SARS-COV-2 RNA SPEC QL NAA+PROBE: SIGNIFICANT CHANGE UP
SODIUM SERPL-SCNC: 140 MMOL/L — SIGNIFICANT CHANGE UP (ref 135–146)
SP GR SPEC: 1.01 — LOW (ref 1.01–1.03)
TROPONIN T SERPL-MCNC: <0.01 NG/ML — SIGNIFICANT CHANGE UP
UROBILINOGEN FLD QL: SIGNIFICANT CHANGE UP
WBC # BLD: 8.42 K/UL — SIGNIFICANT CHANGE UP (ref 4.8–10.8)
WBC # FLD AUTO: 8.42 K/UL — SIGNIFICANT CHANGE UP (ref 4.8–10.8)
WBC UR QL: 5 /HPF — SIGNIFICANT CHANGE UP (ref 0–5)

## 2022-02-18 PROCEDURE — 99236 HOSP IP/OBS SAME DATE HI 85: CPT

## 2022-02-18 PROCEDURE — 93010 ELECTROCARDIOGRAM REPORT: CPT

## 2022-02-18 PROCEDURE — 99451 NTRPROF PH1/NTRNET/EHR 5/>: CPT

## 2022-02-18 PROCEDURE — 70450 CT HEAD/BRAIN W/O DYE: CPT | Mod: 26,MA

## 2022-02-18 RX ORDER — SODIUM CHLORIDE 9 MG/ML
1000 INJECTION, SOLUTION INTRAVENOUS ONCE
Refills: 0 | Status: COMPLETED | OUTPATIENT
Start: 2022-02-18 | End: 2022-02-18

## 2022-02-18 RX ADMIN — SODIUM CHLORIDE 1000 MILLILITER(S): 9 INJECTION, SOLUTION INTRAVENOUS at 08:19

## 2022-02-18 NOTE — CONSULT NOTE ADULT - SUBJECTIVE AND OBJECTIVE BOX
MEDICAL TOXICOLOGY CONSULT    HPI:  81 year old female h/o anxiety, HTN, HLD, hypothyroidism, AVR, Paroxysmal Atrial fibrillation coming in with an accidental overdose on one days worth of her routine medications at 6:30 AM.   Medication list includes levothyroxine 50mcg, Eliquis 5mg, Dronaderone 400 mg/pill - unclear dose, metoprolol succinate ER 50mg/pill - unclear dose , aspirin 81 mg, Crestor 10 mg, mirtazapine - unclear dose and ativan 1 mg.  Patient was sleepy on ED arrival.     ONSET / TIME of exposure(s): 6:30 AM     QUANTITY of exposure(s): levothyroxine 50mcg, Eliquis 5mg, Dronaderone 400 mg/pill - unclear dose, metoprolol succinate ER 50mg/pill - unclear dose , aspirin 81 mg, Crestor 10 mg, mirtazapine - unclear dose and ativan 1 mg.    ROUTE of exposure: Ingestion    CONTEXT of exposure: Home     ASSOCIATED symptoms: Sleepy    REVIEW OF SYSTEMS:     Negative except HPI above    Vital Signs Last 24 Hrs  T(C): 36.6 (2022 07:48), Max: 36.6 (2022 07:48)  T(F): 97.9 (2022 07:48), Max: 97.9 (2022 07:48)  HR: 69 (2022 07:48) (69 - 69)  BP: 141/61 (2022 07:48) (141/61 - 141/61)  RR: 18 (2022 07:48) (18 - 18)  SpO2: 99% (2022 07:48) (99% - 99%)    SIGNIFICANT LABORATORY STUDIES:                        13.3   8.42  )-----------( 207      ( 2022 08:07 )             42.2     02-18    140  |  101  |  15  ----------------------------<  99  5.0   |  25  |  0.7    Ca    8.9      2022 08:07  Mg     2.3     02-18    TPro  6.9  /  Alb  3.8  /  TBili  0.3  /  DBili  x   /  AST  23  /  ALT  11  /  AlkPhos  70  02-18    Urinalysis Basic - ( 2022 09:25 )    Color: Colorless / Appearance: Clear / S.006 / pH: x  Gluc: x / Ketone: Negative  / Bili: Negative / Urobili: <2 mg/dL   Blood: x / Protein: Negative / Nitrite: Negative   Leuk Esterase: Large / RBC: 1 /HPF / WBC 5 /HPF   Sq Epi: x / Non Sq Epi: 1 /HPF / Bacteria: Negative    Aspirin Level: <0.3<L>   @ 08:08  Acetaminophen Level:  <5.0<L>   @ 08:08

## 2022-02-18 NOTE — ED CDU PROVIDER DISPOSITION NOTE - PATIENT PORTAL LINK FT
You can access the FollowMyHealth Patient Portal offered by [facility] by registering at the following website: [email or web address]. By joining [facility]’s FollowMyHealth portal, you will also be able to view your health information using other applications (apps) compatible with our system.

## 2022-02-18 NOTE — ED PROVIDER NOTE - OBJECTIVE STATEMENT
81 year old female with phmx of anxiety, htn, hld, hypothyroidism, 81 year old female with phmx of anxiety, htn, hld, hypothyroidism, coming in for acccidental overdose. pt was slightly more confused than baseline today morning when was left alone for 5 minutes when she took entire day's worth of medications. she is currently taking levothyroxine, eliquis, multac, metoprolol, aspirin, crestor, mirtazipine and ativan. pt was then brought to the ED. Here, she is aoax3 but sleepy and answering questions in one word answers. pt's son is at bedside.

## 2022-02-18 NOTE — ED PROVIDER NOTE - CLINICAL SUMMARY MEDICAL DECISION MAKING FREE TEXT BOX
Patient presented status post accidental overdose of her medications this morning.  Otherwise on arrival patient afebrile, hemodynamically stable, neurovascularly intact, somnolent but arousable, AOx3 and roughly at her baseline per son.  Obtained EKG which was grossly nonischemic with normal intervals.  Obtained labs which were grossly unremarkable including no significant leukocytosis, anemia, signs of dehydration/TAMAR, transaminitis or significant electrolyte abnormalities.  Consulted toxicology who recommended 12-hour observation for drug ingestion but otherwise if no further complaints can be cleared thereafter.  Will place in obs for further monitoring.  Son at bedside and patient agreeable with plan.

## 2022-02-18 NOTE — ED PROVIDER NOTE - PHYSICAL EXAMINATION
CONSTITUTIONAL: poorly developed; well-nourished; in no acute distress.   SKIN: warm, dry  HEAD: Normocephalic; atraumatic.  EYES: PERRL, EOMI, normal sclera and conjunctiva   ENT: No nasal discharge; airway clear.  NECK: Supple; non tender.  CARD:  Regular rate and rhythm.   RESP: NO inc WOB   ABD: soft ntnd  EXT: Normal ROM.    LYMPH: No acute cervical adenopathy.  NEURO: Alert, oriented, grossly unremarkable  PSYCH: Cooperative, appropriate.

## 2022-02-18 NOTE — ED CDU PROVIDER DISPOSITION NOTE - NSFOLLOWUPINSTRUCTIONS_ED_ALL_ED_FT
WHAT YOU NEED TO KNOW:    An overdose occurs when you take more medicine than is safe to take. An overdose may be mild, or it may be a life-threatening emergency. You may feel drowsy, dizzy, or nauseated, depending on what medicine you took. No specific harm was found to your body as a result of your overdose. Your symptoms have decreased over the last 6 to 12 hours.    DISCHARGE INSTRUCTIONS:    Call 911 if you or someone close to you has any of the following symptoms:   •Your face is very pale and clammy to the touch.       •Your body is limp or you are unable to speak.      •You cannot be awakened.       •Your breathing is slower or faster than usual.       •Your heart is beating slower than usual.      •You feel confused or more tired than usual, or you are sweating more than normal.      •Your speech is slurred.       •Your fingernails or lips are blue or purple.      Return to the emergency department if:   •You have severe nausea and vomiting.      •You cannot have a bowel movement or urinate.      •Your skin and the whites of your eyes turn yellow.      Contact your healthcare provider if:   •You think your medicine is not working.      •You have nausea, vomiting, diarrhea, or abdominal cramps.      •You have questions or concerns about your medicine.      Take your medicine as directed: Contact your healthcare provider if you think your medicine is not helping or if you have side effects. Do not take more medicine that is prescribed. Keep your medicines in the original containers. Keep a list of the medicines, vitamins, and herbs you take. Include the amounts, and when and why you take them. Do not share your medicine with others.    Prevent another overdose:   •Read labels carefully. Read the labels of all the medicines that you take. Never take more than the label says to take. If you have questions, ask your pharmacist or healthcare provider.      •Do not drink alcohol. Alcohol increases your risk for another overdose. Alcohol can also hide important symptoms that you need to call your healthcare provider for.      •Do not drive or operate machinery until your healthcare provider says it is okay. These activities may be dangerous after an overdose.      •Use caution if you take more than one medicine at a time. Mixing medicines or taking more than one medicine at a time can be dangerous.      •Tell your family or friends what medicines you are taking. Talk with them about what to do if you have an overdose.       Follow up with your healthcare provider as directed: You may need to see a counselor or psychiatrist. Write down your questions so you remember to ask them during

## 2022-02-18 NOTE — ED CDU PROVIDER INITIAL DAY NOTE - PROGRESS NOTE DETAILS
Patient endorsed to me this morning to observe following an overdose on daily medications.  The patient is awake, states she is feeling better, denies any complaints.  Her son who is at the bedside confirms that she that this is her baseline mental status and she appears to have improved greatly since this morning.  We will continue observation, patient is aware that the observation.  Will extend to about 6:30 PM.  She at present she is tolerating p.o.  Will continue observation. CT head is done no acute pathology found, however, the radiologist described a hypodensity in the frontal lobe which may represent an age-indeterminate infarct.  Results of this scan were discussed with the patient's son and the patient herself, they do not wish to get an MRI done at this time, would like to pursue outpatient work-up. Will give neurology contact info upon discharge. Case endorsed to Dr. Parker to reassess  the patient at that the end of the observation and dispo. CT head is done no acute pathology found, however, the radiologist described a hypodensity in the frontal lobe which may represent an age-indeterminate infarct.  Results of this scan were discussed with the patient's son and the patient herself, they do not wish to get an MRI done at this time, would like to pursue outpatient work-up. Will give neurology contact info upon discharge.  Patient is tolerating PO. Case endorsed to Dr. Parker to reassess  the patient at that the end of the observation and dispo. Pt is much more alert. Speech no longer slow and is quick to answer. Pt is at her baseline mental status (dementia) and knows her name and where she is. Family at bedside confirm she is back to her baseline

## 2022-02-18 NOTE — ED ADULT NURSE NOTE - NSIMPLEMENTINTERV_GEN_ALL_ED
Implemented All Fall with Harm Risk Interventions:  [place] to call system. Call bell, personal items and telephone within reach. Instruct patient to call for assistance. Room bathroom lighting operational. Non-slip footwear when patient is off stretcher. Physically safe environment: no spills, clutter or unnecessary equipment. Stretcher in lowest position, wheels locked, appropriate side rails in place. Provide visual cue, wrist band, yellow gown, etc. Monitor gait and stability. Monitor for mental status changes and reorient to person, place, and time. Review medications for side effects contributing to fall risk. Reinforce activity limits and safety measures with patient and family. Provide visual clues: red socks.

## 2022-02-18 NOTE — ED CDU PROVIDER INITIAL DAY NOTE - ATTENDING CONTRIBUTION TO CARE
81-year-old female PMH anxiety, HTN, HLD, hypothyroidism, paroxysmal A. fib on NOAC placed in OBS under toxicology protocol.  The patient presented to ED with her son following an overdose on her daily medications.  According to the son, the patient has mild memory problems,  this morning she took all her medications for the entire day.  Her son and her home health aide found her sitting at a table, somewhat slumped over, and not very responsive/somnolent. .  There is no history of trauma,  no recent illness or any  other concerning events.  This is an elderly frail appearing female, , resting on stretcher in NAD, PERRL, pink conjunctiva, somewhat tacky oral mucosa, no meningeal signs, normal work of breathing, speaking full sentences, irregular irregular rate and rhythm, well-perfused extremities, abdomen soft NT/ND, BS present in all quadrants, awake and alert, (aware she is in the hospital, recognizes her son, confused to the exact date which seems to be at baseline).  Will continue observation until 6:30 PM as advised by toxicology service.  The patient and her son are amenable with the plan.

## 2022-02-18 NOTE — ED CDU PROVIDER INITIAL DAY NOTE - PHYSICAL EXAMINATION
CONST: Well appearing in NAD  EYES: PERRL, EOMI, Sclera and conjunctiva clear.   ENT:  Oropharynx normal appearing, no erythema or exudates. Uvula midline.  NECK: Non-tender, no meningeal signs  CARD:  Normal rate and rhythm  RESP: Equal BS B/L, No wheezes, rhonchi or rales. No distress  GI: Soft, non-tender, non-distended.  MS: Normal ROM in all extremities. No midline spinal tenderness.  SKIN: Warm, dry, no acute rashes. Good turgor  NEURO: A&Ox3, Pt is sleepy but easily arousable. No focal deficits. Strength 5/5 with no sensory deficits. Speech is slow but  answers appropriately

## 2022-02-18 NOTE — CONSULT NOTE ADULT - ASSESSMENT
·	Certain medications on her med list (Metoprolol ER, Mirtazipine, Dronaderone) would require an observation period for 12 hours with cardiac monitoring.   ·	Please place in Tox obs. Unit for 12 hours - 6:30 PM. If patient remains vitally stable, is tolerating PO and has no new active complaints then can be cleared.   ·	Would recommend to educate/ensure safe medication use to avoid similar future incidents.     d/w Dr. Clint Ken - Toxicology attending of record     Thank you for the consult.  ·	Certain medications on her med list (Metoprolol ER, Mirtazipine, Dronaderone) would require an observation period for 12 hours with cardiac monitoring.   ·	Please place in Tox obs. Unit for 12 hours - 6:30 PM. If patient remains vitally stable, is tolerating PO and has no new active complaints then can be cleared.   ·	Would recommend to educate/ensure safe medication use to avoid similar future incidents.     d/w Dr. Clint Ken - Toxicology attending of record     Thank you for the consult.     I personally discussed with ED and Obs teams. I reviewed the medical tox fellow’s note (as assigned above), and agree with the findings and plan except as documented in my note.  Patient with exposure to an extra day worth of medication.  Very low risk exposure, but due to patient's underlying medical conditions and age, would be better to monitor in obs overnight in obs.  If stable after 12 hours, can resume medications normally.    --Will continue to follow. Please call with any further questions    [place]    [phone] (pager)

## 2022-02-18 NOTE — ED CDU PROVIDER DISPOSITION NOTE - NSFOLLOWUPCLINICS_GEN_ALL_ED_FT
[facility]  Neurology  [address] [place] [address], [facility] [address]  [place] [address]  Phone: [phone]  Fax:   Follow Up Time: 4-6 Days

## 2022-02-18 NOTE — ED ADULT NURSE REASSESSMENT NOTE - NS ED NURSE REASSESS COMMENT FT1
Received pt from previous RN. Pt is alert & oriented, breathing with ease on 2L N/C. Perma fit placed. Pt has CT of head pending, and has son at the bedside. Will continue to monitor and provide pt care.

## 2022-02-18 NOTE — ED PROVIDER NOTE - ATTENDING CONTRIBUTION TO CARE
81-year-old female past medical history as documented presenting status post accidental over ingestion of her medications.  Per son at bedside patient took her morning and night dose all at the same time accidentally prior to arrival.  Patient currently taking levothyroxine, Eliquis, Multaq, metoprolol, aspirin, Crestor, mirtazapine, and Ativan.  Patient at this time is AAO x3 but somnolent, giving one-word answers.  Denies active complaints.    Vital Signs: I have reviewed the initial vital signs.  Constitutional: NAD, well-nourished, appears stated age, no acute distress.  HEENT: Airway patent, moist MM, no erythema/swelling/deformity of oral structures. EOMI, PERRLA.  CV: regular rate, regular rhythm, well-perfused extremities, 2+ b/l DP and radial pulses equal.  Lungs: BCTA, no increased WOB.  ABD: NTND, no guarding or rebound, no pulsatile mass, no hernias.   MSK: Neck supple, nontender, nl ROM, no stepoff. Chest nontender. Back nontender in TLS spine or to b/l bony structures or flanks. Ext nontender, nl rom, no deformity.   INTEG: Skin warm, dry, no rash.  NEURO: A&Ox3, somnolent but arousable, normal strength, nl sensation throughout, normal speech.   PSYCH: Calm, cooperative, normal affect and interaction.    We will obtain EKG, tox labs, consult tox, monitor, reeval.

## 2022-02-18 NOTE — ED CDU PROVIDER DISPOSITION NOTE - CLINICAL COURSE
patient presents with accidental overdose, took extra doses of her routine medications. Sleepy on arrival which has improved. labs, ekg, cxr, ct done. no acute findings. Toxicology consulted who recommends 12 hour tox obs. patient now back at baseline. Found to have possible age indeterminant ischemic change of ct scan. Results discussed with family who state that they would prefer to have it evaluated as an outpatient. All other results discussed with patient and family. Cleared by toxicology. Discharged with pmd and neurology follow up. Return precautions discussed.

## 2022-02-18 NOTE — ED CDU PROVIDER INITIAL DAY NOTE - NS ED ROS FT
CONST: No fever, chills or bodyaches  EYES: No pain, redness, drainage or visual changes.  ENT: No ear pain or discharge, nasal discharge or congestion. No sore throat  CARD: No chest pain, palpitations  RESP: No SOB, cough, hemoptysis.   GI: No abdominal pain, N/V/D  MS: No joint pain, back pain or extremity pain/injury  SKIN: No rashes  NEURO: No headache, dizziness, paresthesias or LOC. No weakness  : No dysuria

## 2022-02-18 NOTE — ED CDU PROVIDER DISPOSITION NOTE - ATTENDING CONTRIBUTION TO CARE
81 year old female h/o anxiety, HTN, HLD, hypothyroidism, AVR, Paroxysmal Atrial fibrillation coming in with an accidental overdose. Patient accidentally took her pm medications along with her am medications this morning. Medications include levothyroxine, Eliquis, multac, metoprolol, aspirin, Crestor, mirtazapine and ativan. Patient likely took extra dose of eliquis, ativan and metroprolol. On arrival patient more sleepy than baseline. Toxicology consulted and recommends tox observation.     CONSTITUTIONAL: Well-developed; well-nourished; in no acute distress.   SKIN: warm, dry  HEAD: Normocephalic; atraumatic.  EYES: PERRL, EOMI, no conjunctival erythema  ENT: No nasal discharge; airway clear.  NECK: Supple; non tender.  CARD: S1, S2 normal;  Regular rate and rhythm.   RESP: No wheezes, rales or rhonchi.  ABD: soft non tender, non distended, no rebound or guarding  EXT: Normal ROM.  5/5 strength in all 4 extremities   LYMPH: No acute cervical adenopathy.  NEURO: Alert, oriented, grossly unremarkable. neurovascularly intact  PSYCH: Cooperative, appropriate.

## 2022-02-18 NOTE — ED CDU PROVIDER INITIAL DAY NOTE - OBJECTIVE STATEMENT
81 year old female with phmx of anxiety, htn, hld, hypothyroidism, coming in for accidental overdose. pt was slightly more confused than baseline today morning when was left alone for 5 minutes when she took entire day's worth of medications. she is currently taking levothyroxine, Eliquis, multac, metoprolol, aspirin, Crestor, mirtazapine and ativan. pt was then brought to the ED. Here, she is aoax3 but sleepy and answering questions in one word answers. pt's son is at bedside. Tox was consulted and was recommended to observe x 12 hours

## 2022-02-18 NOTE — ED ADULT TRIAGE NOTE - CHIEF COMPLAINT QUOTE
BIBA from home. As per son, when he woke up and her home aide took a triple dose of her daily meds (eliquis, metoprolol, crestor, levothyroxine, and ativan). Patient has hx of dementia but is AxOx3. Patient lethargic in triage. .

## 2022-02-21 LAB
-  AMIKACIN: SIGNIFICANT CHANGE UP
-  AMOXICILLIN/CLAVULANIC ACID: SIGNIFICANT CHANGE UP
-  AMPICILLIN/SULBACTAM: SIGNIFICANT CHANGE UP
-  AMPICILLIN: SIGNIFICANT CHANGE UP
-  AZTREONAM: SIGNIFICANT CHANGE UP
-  CEFAZOLIN: SIGNIFICANT CHANGE UP
-  CEFEPIME: SIGNIFICANT CHANGE UP
-  CEFOXITIN: SIGNIFICANT CHANGE UP
-  CEFTRIAXONE: SIGNIFICANT CHANGE UP
-  CIPROFLOXACIN: SIGNIFICANT CHANGE UP
-  ERTAPENEM: SIGNIFICANT CHANGE UP
-  GENTAMICIN: SIGNIFICANT CHANGE UP
-  LEVOFLOXACIN: SIGNIFICANT CHANGE UP
-  MEROPENEM: SIGNIFICANT CHANGE UP
-  NITROFURANTOIN: SIGNIFICANT CHANGE UP
-  PIPERACILLIN/TAZOBACTAM: SIGNIFICANT CHANGE UP
-  TOBRAMYCIN: SIGNIFICANT CHANGE UP
-  TRIMETHOPRIM/SULFAMETHOXAZOLE: SIGNIFICANT CHANGE UP
CULTURE RESULTS: SIGNIFICANT CHANGE UP
METHOD TYPE: SIGNIFICANT CHANGE UP
ORGANISM # SPEC MICROSCOPIC CNT: SIGNIFICANT CHANGE UP
ORGANISM # SPEC MICROSCOPIC CNT: SIGNIFICANT CHANGE UP
SPECIMEN SOURCE: SIGNIFICANT CHANGE UP

## 2022-03-07 ENCOUNTER — APPOINTMENT (OUTPATIENT)
Dept: CARDIOLOGY | Facility: CLINIC | Age: 82
End: 2022-03-07

## 2022-03-16 ENCOUNTER — INPATIENT (INPATIENT)
Facility: HOSPITAL | Age: 82
LOS: 4 days | Discharge: NURSING, REHAB OR LONG-TERM CARE | End: 2022-03-21
Attending: INTERNAL MEDICINE | Admitting: INTERNAL MEDICINE
Payer: MEDICARE

## 2022-03-16 VITALS
SYSTOLIC BLOOD PRESSURE: 136 MMHG | HEART RATE: 76 BPM | HEIGHT: 66 IN | DIASTOLIC BLOOD PRESSURE: 80 MMHG | RESPIRATION RATE: 18 BRPM | TEMPERATURE: 98 F | OXYGEN SATURATION: 99 %

## 2022-03-16 LAB
APTT BLD: 35.1 SEC — SIGNIFICANT CHANGE UP (ref 27–39.2)
BASOPHILS # BLD AUTO: 0.05 K/UL — SIGNIFICANT CHANGE UP (ref 0–0.2)
BASOPHILS NFR BLD AUTO: 0.5 % — SIGNIFICANT CHANGE UP (ref 0–1)
EOSINOPHIL # BLD AUTO: 0.99 K/UL — HIGH (ref 0–0.7)
EOSINOPHIL NFR BLD AUTO: 10.1 % — HIGH (ref 0–8)
HCT VFR BLD CALC: 38.2 % — SIGNIFICANT CHANGE UP (ref 37–47)
HGB BLD-MCNC: 12.2 G/DL — SIGNIFICANT CHANGE UP (ref 12–16)
IMM GRANULOCYTES NFR BLD AUTO: 0.7 % — HIGH (ref 0.1–0.3)
INR BLD: 1.21 RATIO — SIGNIFICANT CHANGE UP (ref 0.65–1.3)
LACTATE SERPL-SCNC: 1 MMOL/L — SIGNIFICANT CHANGE UP (ref 0.7–2)
LYMPHOCYTES # BLD AUTO: 1.24 K/UL — SIGNIFICANT CHANGE UP (ref 1.2–3.4)
LYMPHOCYTES # BLD AUTO: 12.6 % — LOW (ref 20.5–51.1)
MCHC RBC-ENTMCNC: 29 PG — SIGNIFICANT CHANGE UP (ref 27–31)
MCHC RBC-ENTMCNC: 31.9 G/DL — LOW (ref 32–37)
MCV RBC AUTO: 91 FL — SIGNIFICANT CHANGE UP (ref 81–99)
MONOCYTES # BLD AUTO: 0.8 K/UL — HIGH (ref 0.1–0.6)
MONOCYTES NFR BLD AUTO: 8.1 % — SIGNIFICANT CHANGE UP (ref 1.7–9.3)
NEUTROPHILS # BLD AUTO: 6.67 K/UL — HIGH (ref 1.4–6.5)
NEUTROPHILS NFR BLD AUTO: 68 % — SIGNIFICANT CHANGE UP (ref 42.2–75.2)
NRBC # BLD: 0 /100 WBCS — SIGNIFICANT CHANGE UP (ref 0–0)
PLATELET # BLD AUTO: 218 K/UL — SIGNIFICANT CHANGE UP (ref 130–400)
PROTHROM AB SERPL-ACNC: 13.9 SEC — HIGH (ref 9.95–12.87)
RBC # BLD: 4.2 M/UL — SIGNIFICANT CHANGE UP (ref 4.2–5.4)
RBC # FLD: 13.9 % — SIGNIFICANT CHANGE UP (ref 11.5–14.5)
WBC # BLD: 9.82 K/UL — SIGNIFICANT CHANGE UP (ref 4.8–10.8)
WBC # FLD AUTO: 9.82 K/UL — SIGNIFICANT CHANGE UP (ref 4.8–10.8)

## 2022-03-16 PROCEDURE — 99285 EMERGENCY DEPT VISIT HI MDM: CPT | Mod: GC

## 2022-03-16 RX ORDER — MORPHINE SULFATE 50 MG/1
2 CAPSULE, EXTENDED RELEASE ORAL ONCE
Refills: 0 | Status: DISCONTINUED | OUTPATIENT
Start: 2022-03-16 | End: 2022-03-16

## 2022-03-16 RX ADMIN — MORPHINE SULFATE 2 MILLIGRAM(S): 50 CAPSULE, EXTENDED RELEASE ORAL at 23:17

## 2022-03-16 NOTE — ED PROVIDER NOTE - OBJECTIVE STATEMENT
81 year old female, PMHx of anxiety, HTN, HLD, hypothyroidism, presents s/p fall out of bed onto her right side. She complains of left hip pain, constant, sharp, non-radiating, no alleviating factors, aggravated by movement. Patient is on eliquis. Denies head trauma, LOC, chest pain, shortness of breath, abdominal pain, nausea, vomiting, headache, dizziness. 81 year old female, PMHx of anxiety, HTN, HLD, hypothyroidism, presents s/p fall out of bed onto her right side. She complains of left hip pain, constant, sharp, non-radiating, no alleviating factors, aggravated by movement. Patient is on Eliquis. Denies head trauma, LOC, chest pain, shortness of breath, abdominal pain, nausea, vomiting, headache, dizziness. 81 year old female, PMHx of anxiety, HTN, HLD, hypothyroidism, presents s/p fall out of bed onto her right side. She complains of right hip pain, constant, sharp, non-radiating, no alleviating factors, aggravated by movement. Patient is on Eliquis. Denies head trauma, LOC, chest pain, shortness of breath, abdominal pain, nausea, vomiting, headache, dizziness.

## 2022-03-16 NOTE — ED PROVIDER NOTE - CLINICAL SUMMARY MEDICAL DECISION MAKING FREE TEXT BOX
Anxiety, Paroxysmal AFib on eliquis , HTN, HLD, Hypothyroidism, Anxiety, Aortic Valve Replacement, multiple aortic surgeries BIBEMS after fall. Trauma workup significant for right hip fracture. Labs reviewed. EKG without STEMI. Admitted to medicine for further care.

## 2022-03-16 NOTE — ED PROVIDER NOTE - NS ED ROS FT
GEN:  no fever, no chills, no generalized weakness  NEURO:  no headache, no dizziness  ENT: no sore throat, no runny nose  CV:  no chest pain, no palpitations  RESP:  no sob, no cough  GI:  no nausea, no vomiting, no abdominal pain, no diarrhea  :  no dysuria, no urinary frequency, no hematuria  MSK:  +right hip pain, no edema  SKIN:  no rash, no bruising

## 2022-03-16 NOTE — ED PROVIDER NOTE - PHYSICAL EXAMINATION
CONSTITUTIONAL: Well-developed; well-nourished; in no acute distress.   SKIN: warm, dry  HEAD: Normocephalic; atraumatic.  EYES: no conjunctival injection. PERRLA. EOMI.   ENT: No nasal discharge; airway clear.  NECK: Supple; non tender.  CARD: S1, S2 normal; Regular rate and rhythm. +murmur. No chest wall or clavicular tenderness.  RESP: No wheezes, rales or rhonchi.  ABD: soft ntnd  EXT: +RLE shortened and externally rotation. +right hip tender to palpation with limited ROM. No edema. Pulses intact b/l UE and LE.  NEURO: Alert, oriented, grossly unremarkable.  PSYCH: Cooperative, appropriate.

## 2022-03-16 NOTE — ED ADULT TRIAGE NOTE - CHIEF COMPLAINT QUOTE
pt fell out of bed at home and landed on the floor on her hip cannot straigten right leg. pt is on eliquis

## 2022-03-16 NOTE — ED PROVIDER NOTE - PROGRESS NOTE DETAILS
CP: Ortho consulted and evaluated patient. XR shows right intertrochanteric fracture. Pending CT. Pt signed out to Dr. Thomas pending ortho consult, CT scans, dispo. DC: On review of final radiologic imaging, concern for dissection. Vascular surgery consulted- recommend CTA. Medicine resident Dr. Chowdhury aware. Will send patient to CT now. Remains stable. DC: Patient signed out to me by Dr. Nguyen pending orthopedic eval, complete ct imaging, reassessment dispo.   Orthopedic evaluated patient- planning for OR. No further traumatic injury noted.   RLE neurovascularly intact. Patient with grandson bedside who states she had a witnessed fall by the aide; however patient is an oriented x 2 ([facility] states this is her baseline and they believe she has dementia). Patient is an unreliable historian. Resident AZ/O: Spoke with Radiology Resident Dr. Tapia, who states that the CT abd/pelvis with IV contrast was performed as arterial phase, so a CT angio is unnecessary. Vascular aware, will see the patient. Patient is HD stable and NAD. Grandson at bedside is notified. DC: Per Dr. Lopez, findings likely were seen on previous imaging and likely chronic however given the cut of today's imaging, is more enhanced. Patient stable. should patient need further treatment or concern that this is an acute change, patient can be upgraded by medicine team as indicated.

## 2022-03-17 PROBLEM — E03.9 HYPOTHYROIDISM, UNSPECIFIED: Chronic | Status: ACTIVE | Noted: 2022-02-26

## 2022-03-17 PROBLEM — I10 ESSENTIAL (PRIMARY) HYPERTENSION: Chronic | Status: ACTIVE | Noted: 2022-02-26

## 2022-03-17 PROBLEM — E78.5 HYPERLIPIDEMIA, UNSPECIFIED: Chronic | Status: ACTIVE | Noted: 2022-02-26

## 2022-03-17 LAB
ALBUMIN SERPL ELPH-MCNC: 3.7 G/DL — SIGNIFICANT CHANGE UP (ref 3.5–5.2)
ALP SERPL-CCNC: 74 U/L — SIGNIFICANT CHANGE UP (ref 30–115)
ALT FLD-CCNC: 10 U/L — SIGNIFICANT CHANGE UP (ref 0–41)
ANION GAP SERPL CALC-SCNC: 10 MMOL/L — SIGNIFICANT CHANGE UP (ref 7–14)
ANION GAP SERPL CALC-SCNC: 8 MMOL/L — SIGNIFICANT CHANGE UP (ref 7–14)
AST SERPL-CCNC: 14 U/L — SIGNIFICANT CHANGE UP (ref 0–41)
BILIRUB SERPL-MCNC: <0.2 MG/DL — SIGNIFICANT CHANGE UP (ref 0.2–1.2)
BLD GP AB SCN SERPL QL: SIGNIFICANT CHANGE UP
BUN SERPL-MCNC: 26 MG/DL — HIGH (ref 10–20)
BUN SERPL-MCNC: 27 MG/DL — HIGH (ref 10–20)
CALCIUM SERPL-MCNC: 8.6 MG/DL — SIGNIFICANT CHANGE UP (ref 8.5–10.1)
CALCIUM SERPL-MCNC: 9.1 MG/DL — SIGNIFICANT CHANGE UP (ref 8.5–10.1)
CHLORIDE SERPL-SCNC: 100 MMOL/L — SIGNIFICANT CHANGE UP (ref 98–110)
CHLORIDE SERPL-SCNC: 101 MMOL/L — SIGNIFICANT CHANGE UP (ref 98–110)
CO2 SERPL-SCNC: 32 MMOL/L — SIGNIFICANT CHANGE UP (ref 17–32)
CO2 SERPL-SCNC: 32 MMOL/L — SIGNIFICANT CHANGE UP (ref 17–32)
CREAT SERPL-MCNC: 0.8 MG/DL — SIGNIFICANT CHANGE UP (ref 0.7–1.5)
CREAT SERPL-MCNC: 0.8 MG/DL — SIGNIFICANT CHANGE UP (ref 0.7–1.5)
EGFR: 74 ML/MIN/1.73M2 — SIGNIFICANT CHANGE UP
EGFR: 74 ML/MIN/1.73M2 — SIGNIFICANT CHANGE UP
ETHANOL SERPL-MCNC: <10 MG/DL — SIGNIFICANT CHANGE UP
GLUCOSE SERPL-MCNC: 117 MG/DL — HIGH (ref 70–99)
GLUCOSE SERPL-MCNC: 93 MG/DL — SIGNIFICANT CHANGE UP (ref 70–99)
HCT VFR BLD CALC: 33.1 % — LOW (ref 37–47)
HGB BLD-MCNC: 10.1 G/DL — LOW (ref 12–16)
INR BLD: 1.07 RATIO — SIGNIFICANT CHANGE UP (ref 0.65–1.3)
LIDOCAIN IGE QN: 59 U/L — SIGNIFICANT CHANGE UP (ref 7–60)
MCHC RBC-ENTMCNC: 28 PG — SIGNIFICANT CHANGE UP (ref 27–31)
MCHC RBC-ENTMCNC: 30.5 G/DL — LOW (ref 32–37)
MCV RBC AUTO: 91.7 FL — SIGNIFICANT CHANGE UP (ref 81–99)
NRBC # BLD: 0 /100 WBCS — SIGNIFICANT CHANGE UP (ref 0–0)
PLATELET # BLD AUTO: 197 K/UL — SIGNIFICANT CHANGE UP (ref 130–400)
POTASSIUM SERPL-MCNC: 4.4 MMOL/L — SIGNIFICANT CHANGE UP (ref 3.5–5)
POTASSIUM SERPL-MCNC: 5.1 MMOL/L — HIGH (ref 3.5–5)
POTASSIUM SERPL-SCNC: 4.4 MMOL/L — SIGNIFICANT CHANGE UP (ref 3.5–5)
POTASSIUM SERPL-SCNC: 5.1 MMOL/L — HIGH (ref 3.5–5)
PROT SERPL-MCNC: 6.5 G/DL — SIGNIFICANT CHANGE UP (ref 6–8)
PROTHROM AB SERPL-ACNC: 12.3 SEC — SIGNIFICANT CHANGE UP (ref 9.95–12.87)
RBC # BLD: 3.61 M/UL — LOW (ref 4.2–5.4)
RBC # FLD: 13.8 % — SIGNIFICANT CHANGE UP (ref 11.5–14.5)
SARS-COV-2 RNA SPEC QL NAA+PROBE: SIGNIFICANT CHANGE UP
SODIUM SERPL-SCNC: 140 MMOL/L — SIGNIFICANT CHANGE UP (ref 135–146)
SODIUM SERPL-SCNC: 143 MMOL/L — SIGNIFICANT CHANGE UP (ref 135–146)
WBC # BLD: 12.1 K/UL — HIGH (ref 4.8–10.8)
WBC # FLD AUTO: 12.1 K/UL — HIGH (ref 4.8–10.8)

## 2022-03-17 PROCEDURE — 73502 X-RAY EXAM HIP UNI 2-3 VIEWS: CPT | Mod: 26,RT

## 2022-03-17 PROCEDURE — 70450 CT HEAD/BRAIN W/O DYE: CPT | Mod: 26,MA

## 2022-03-17 PROCEDURE — 74177 CT ABD & PELVIS W/CONTRAST: CPT | Mod: 26,MA

## 2022-03-17 PROCEDURE — 99223 1ST HOSP IP/OBS HIGH 75: CPT

## 2022-03-17 PROCEDURE — 71045 X-RAY EXAM CHEST 1 VIEW: CPT | Mod: 26

## 2022-03-17 PROCEDURE — 73552 X-RAY EXAM OF FEMUR 2/>: CPT | Mod: 26,RT

## 2022-03-17 PROCEDURE — 93010 ELECTROCARDIOGRAM REPORT: CPT

## 2022-03-17 PROCEDURE — 99221 1ST HOSP IP/OBS SF/LOW 40: CPT

## 2022-03-17 PROCEDURE — 93306 TTE W/DOPPLER COMPLETE: CPT | Mod: 26

## 2022-03-17 PROCEDURE — 73560 X-RAY EXAM OF KNEE 1 OR 2: CPT | Mod: 26,RT

## 2022-03-17 PROCEDURE — 71260 CT THORAX DX C+: CPT | Mod: 26,MA

## 2022-03-17 PROCEDURE — 72125 CT NECK SPINE W/O DYE: CPT | Mod: 26,MA

## 2022-03-17 RX ORDER — DRONEDARONE 400 MG/1
400 TABLET, FILM COATED ORAL
Refills: 0 | Status: DISCONTINUED | OUTPATIENT
Start: 2022-03-17 | End: 2022-03-18

## 2022-03-17 RX ORDER — MIRTAZAPINE 45 MG/1
0 TABLET, ORALLY DISINTEGRATING ORAL
Qty: 0 | Refills: 0 | DISCHARGE

## 2022-03-17 RX ORDER — ACETAMINOPHEN 500 MG
975 TABLET ORAL EVERY 6 HOURS
Refills: 0 | Status: DISCONTINUED | OUTPATIENT
Start: 2022-03-17 | End: 2022-03-18

## 2022-03-17 RX ORDER — LEVOTHYROXINE SODIUM 125 MCG
1 TABLET ORAL
Qty: 0 | Refills: 0 | DISCHARGE

## 2022-03-17 RX ORDER — MIRTAZAPINE 45 MG/1
30 TABLET, ORALLY DISINTEGRATING ORAL AT BEDTIME
Refills: 0 | Status: DISCONTINUED | OUTPATIENT
Start: 2022-03-17 | End: 2022-03-18

## 2022-03-17 RX ORDER — HEPARIN SODIUM 5000 [USP'U]/ML
5000 INJECTION INTRAVENOUS; SUBCUTANEOUS EVERY 8 HOURS
Refills: 0 | Status: DISCONTINUED | OUTPATIENT
Start: 2022-03-17 | End: 2022-03-18

## 2022-03-17 RX ORDER — DRONEDARONE 400 MG/1
1 TABLET, FILM COATED ORAL
Qty: 0 | Refills: 0 | DISCHARGE

## 2022-03-17 RX ORDER — LEVOTHYROXINE SODIUM 125 MCG
50 TABLET ORAL DAILY
Refills: 0 | Status: DISCONTINUED | OUTPATIENT
Start: 2022-03-17 | End: 2022-03-18

## 2022-03-17 RX ORDER — SENNA PLUS 8.6 MG/1
2 TABLET ORAL AT BEDTIME
Refills: 0 | Status: DISCONTINUED | OUTPATIENT
Start: 2022-03-17 | End: 2022-03-18

## 2022-03-17 RX ORDER — KETOROLAC TROMETHAMINE 30 MG/ML
15 SYRINGE (ML) INJECTION ONCE
Refills: 0 | Status: DISCONTINUED | OUTPATIENT
Start: 2022-03-17 | End: 2022-03-17

## 2022-03-17 RX ORDER — METOPROLOL TARTRATE 50 MG
1 TABLET ORAL
Qty: 0 | Refills: 0 | DISCHARGE

## 2022-03-17 RX ORDER — ASPIRIN/CALCIUM CARB/MAGNESIUM 324 MG
81 TABLET ORAL DAILY
Refills: 0 | Status: DISCONTINUED | OUTPATIENT
Start: 2022-03-17 | End: 2022-03-18

## 2022-03-17 RX ORDER — METOPROLOL TARTRATE 50 MG
50 TABLET ORAL DAILY
Refills: 0 | Status: DISCONTINUED | OUTPATIENT
Start: 2022-03-17 | End: 2022-03-18

## 2022-03-17 RX ORDER — MORPHINE SULFATE 50 MG/1
2 CAPSULE, EXTENDED RELEASE ORAL ONCE
Refills: 0 | Status: DISCONTINUED | OUTPATIENT
Start: 2022-03-17 | End: 2022-03-17

## 2022-03-17 RX ORDER — HYDROMORPHONE HYDROCHLORIDE 2 MG/ML
1 INJECTION INTRAMUSCULAR; INTRAVENOUS; SUBCUTANEOUS EVERY 4 HOURS
Refills: 0 | Status: DISCONTINUED | OUTPATIENT
Start: 2022-03-17 | End: 2022-03-18

## 2022-03-17 RX ORDER — AMLODIPINE BESYLATE 2.5 MG/1
1 TABLET ORAL
Qty: 0 | Refills: 0 | DISCHARGE

## 2022-03-17 RX ORDER — APIXABAN 2.5 MG/1
1 TABLET, FILM COATED ORAL
Qty: 0 | Refills: 0 | DISCHARGE

## 2022-03-17 RX ORDER — ATORVASTATIN CALCIUM 80 MG/1
40 TABLET, FILM COATED ORAL AT BEDTIME
Refills: 0 | Status: DISCONTINUED | OUTPATIENT
Start: 2022-03-17 | End: 2022-03-18

## 2022-03-17 RX ADMIN — MORPHINE SULFATE 2 MILLIGRAM(S): 50 CAPSULE, EXTENDED RELEASE ORAL at 04:40

## 2022-03-17 RX ADMIN — HYDROMORPHONE HYDROCHLORIDE 1 MILLIGRAM(S): 2 INJECTION INTRAMUSCULAR; INTRAVENOUS; SUBCUTANEOUS at 22:44

## 2022-03-17 RX ADMIN — Medication 50 MICROGRAM(S): at 06:00

## 2022-03-17 RX ADMIN — MIRTAZAPINE 30 MILLIGRAM(S): 45 TABLET, ORALLY DISINTEGRATING ORAL at 22:29

## 2022-03-17 RX ADMIN — HEPARIN SODIUM 5000 UNIT(S): 5000 INJECTION INTRAVENOUS; SUBCUTANEOUS at 22:31

## 2022-03-17 RX ADMIN — Medication 81 MILLIGRAM(S): at 11:47

## 2022-03-17 RX ADMIN — Medication 50 MILLIGRAM(S): at 06:00

## 2022-03-17 RX ADMIN — Medication 15 MILLIGRAM(S): at 04:39

## 2022-03-17 RX ADMIN — DRONEDARONE 400 MILLIGRAM(S): 400 TABLET, FILM COATED ORAL at 22:29

## 2022-03-17 RX ADMIN — HEPARIN SODIUM 5000 UNIT(S): 5000 INJECTION INTRAVENOUS; SUBCUTANEOUS at 15:03

## 2022-03-17 RX ADMIN — Medication 15 MILLIGRAM(S): at 02:03

## 2022-03-17 RX ADMIN — MORPHINE SULFATE 2 MILLIGRAM(S): 50 CAPSULE, EXTENDED RELEASE ORAL at 05:18

## 2022-03-17 RX ADMIN — MORPHINE SULFATE 2 MILLIGRAM(S): 50 CAPSULE, EXTENDED RELEASE ORAL at 05:46

## 2022-03-17 RX ADMIN — HEPARIN SODIUM 5000 UNIT(S): 5000 INJECTION INTRAVENOUS; SUBCUTANEOUS at 06:00

## 2022-03-17 RX ADMIN — SENNA PLUS 2 TABLET(S): 8.6 TABLET ORAL at 22:30

## 2022-03-17 RX ADMIN — MORPHINE SULFATE 2 MILLIGRAM(S): 50 CAPSULE, EXTENDED RELEASE ORAL at 00:02

## 2022-03-17 RX ADMIN — Medication 975 MILLIGRAM(S): at 19:05

## 2022-03-17 RX ADMIN — ATORVASTATIN CALCIUM 40 MILLIGRAM(S): 80 TABLET, FILM COATED ORAL at 22:30

## 2022-03-17 RX ADMIN — DRONEDARONE 400 MILLIGRAM(S): 400 TABLET, FILM COATED ORAL at 06:00

## 2022-03-17 NOTE — CONSULT NOTE ADULT - ATTENDING COMMENTS
r it fx  for oe once optimized  needs cardiology eval  stop anticoag/eliquis
I have seen the patient in office in July 2021, when she was post open arch and ascending aortic aneurysm repair, and had a large DTA dissection and aneurysm. Since then she had a TEVAR and right renal stenting for addressing the issue. She is actively seen and followed in OSH.    The DTA aneurysm has shrank in size since last imaging and the dissection in the infrarenal part, is not flow limiting to any of the iliac or visceral vessels (is indeed below renals). Infrarenal aorta is <4 cm.  She can undergo her hip procedure, and FU with her vascular surgeon.
High-risk for perioperative major adverse cardiac events    There are no current cardiac contraindications that prohibit proceeding with the scheduled surgery/procedure.  This consult serves only as a perioperative cardiac risk stratification and evaluation to predict 30-day cardiac complications risk and mortality.  The decision to proceed with the surgery/procedure is made by the performing physician and the patient.

## 2022-03-17 NOTE — CONSULT NOTE ADULT - CONSULT REASON
incidental ct finding aortic focal dissection, suprarenal aaa
BUNNY MCCLELLAND FRACTURE
Pre-op cardiac risk stratification and optimization

## 2022-03-17 NOTE — H&P ADULT - NSHPPHYSICALEXAM_GEN_ALL_CORE
GEN: NAD, Well Appearing, Well nourished  HEENT: NCAT, PERRL, EOMI, No Icterus, No Pallor, No nystagmus, Vision Intact, No JVD/TD  CV/CHEST: RRR, +S1/S2, no murmurs  PULM: CTAB, Good Air Entry Bilaterally  ABD: SNTTP, ND x 4 Q's  EXT: RLE ext rotated and mildly shortened Warm, Well Perfused x 4. No Edema  SKIN: No Rash  NEURO: Awake, alert not oriented

## 2022-03-17 NOTE — PRE-ANESTHESIA EVALUATION ADULT - NSANTHOSAYNRD_GEN_A_CORE
No. TULIO screening performed.  STOP BANG Legend: 0-2 = LOW Risk; 3-4 = INTERMEDIATE Risk; 5-8 = HIGH Risk

## 2022-03-17 NOTE — CONSULT NOTE ADULT - ASSESSMENT
Patient vitally stable   Discussed with fellow Gudelia will evaluate patient   No surgical intervention at this time  Patient vitally stable   Discussed with fellow Gudelia will evaluate patient   No surgical intervention at this time       patient has seen Dr. Motley in the office in 2021

## 2022-03-17 NOTE — PATIENT PROFILE ADULT - DO YOU FEEL UNSAFE AT HOME, WORK, OR SCHOOL?
Patient attends Pre-Op Testing today following consult with Dr. Case due to chronic pain to right hip. No significant past medical history. Elective RTHR is now scheduled in this facility for 7/26/18. no

## 2022-03-17 NOTE — CONSULT NOTE ADULT - SUBJECTIVE AND OBJECTIVE BOX
81y Female community ambulatory presents c/o R hip pain and inability to ambulate sp fall of unknown cause. At time of examination patient is obtunded and responds inappropriately to questions which her grandson states is secondary to pain medication.    .Patient's daughter states she has a complex cardiac history, with multiple extensive "aortic reconstructions" at [place] and [facility] for what she believed was aortic dissection. She states these procedures were "a number of years ago."  Her daughter additionally states she had her right knee replaced but cannot recall where it was performed.       PAST MEDICAL & SURGICAL HISTORY:  Hypertension    Hyperlipidemia    Hypothyroidism    Dementia     Multiple aortic procedures  STENT placement  R. TKA       MEDICATIONS  (STANDING):    Allergies    No Known Allergies    Intolerances        Vital Signs Last 24 Hrs  T(C): 36.6 (03-16-22 @ 22:32), Max: 36.6 (03-16-22 @ 22:32)  T(F): 97.8 (03-16-22 @ 22:32), Max: 97.8 (03-16-22 @ 22:32)  HR: 68 (03-16-22 @ 23:21) (68 - 76)  BP: 145/66 (03-16-22 @ 23:21) (136/80 - 145/66)  BP(mean): --  RR: 18 (03-16-22 @ 23:21) (18 - 18)  SpO2: 95% (03-16-22 @ 23:21) (95% - 99%)  Physical Exam  General: NAD, Alert, Awake and oriented  Neurologic: No gross deficits, moving all 4s.  Head: NCAT without abrasions, lacerations, or ecchymosis to head, face, or scalp  Eyes: PERRL  Neck/C-Spine: FAROM. No bony TTP.  T/L Spine: No bony TTP, no palpable step-off.    HIPS and PELVIS: Unable to SLR ***Hip.     RUE:  No open skin or wounds  NTTP shoulder, upper arm, elbow, forearm, wrist or hand  Full baseline painless ROM at shoulder, elbow, wrist, and   SILT in axillary, musculocutaneous,  radial, median, and ulnar distributions.   AIN/PIN/U motor intact  2+ radial pulse with brisk cap refill at distal finger tips.   Compartments soft and compressible.    LUE:  No open skin or wounds  NTTP shoulder, upper arm, elbow, forearm, wrist or hand  Full baseline painless ROM at shoulder, elbow, wrist, and   SILT in axillary, musculocutaneous,  radial, median, and ulnar distributions.   AIN/PIN/U motor intact  2+ radial pulse with brisk cap refill at distal finger tips.   Compartments soft and compressible.    RLE:  No open skin or wounds  TTP at the groin and lateral hip   ROm limited 2/2 pain   Pain with log-roll or axial compression  Unable to actively SLR.  SILT DP/SP/T/Best/Sa.   EHL/FHL/TA/Gs motor intact.  2+ DP/PT pulses with brisk cap refill distally.  Compartments soft and compressible.   No pain on passive stretch.                            12.2   9.82  )-----------( 218      ( 16 Mar 2022 22:57 )             38.2     16 Mar 2022 22:57    143    |  101    |  27     ----------------------------<  93     4.4     |  32     |  0.8      Ca    9.1        16 Mar 2022 22:57    TPro  6.5    /  Alb  3.7    /  TBili  <0.2   /  DBili  x      /  AST  14     /  ALT  10     /  AlkPhos  74     16 Mar 2022 22:57    PT/INR - ( 16 Mar 2022 22:57 )   PT: 13.90 sec;   INR: 1.21 ratio         PTT - ( 16 Mar 2022 22:57 )  PTT:35.1 sec  Imaging: XR demonstrates R hip fracture    A/P: 81y Female with R. intertrochanteric hip fracture. On eliquis, patient unreliable historian, patient's grandson believes she took her medication earlier today. On presentation, reportedly patient denied head trauma.     - Recommend cardiology consultation   Admit to medical team  Pain control  NWB RLE   FU labs/imaging  Medical clearance/optimization for OR  NPO/IVF at midnight  Glucose control  DVT PPX please transition to heparin and hold eliquis   Trend H/H. Transfuse above 10/30.  Plan for OR once medically cleared.

## 2022-03-17 NOTE — PATIENT PROFILE ADULT - FALL HARM RISK - HARM RISK INTERVENTIONS
Assistance with ambulation/Assistance OOB with selected safe patient handling equipment/Communicate Risk of Fall with Harm to all staff/Discuss with provider need for PT consult/Monitor gait and stability/Reinforce activity limits and safety measures with patient and family/Tailored Fall Risk Interventions/Visual Cue: Yellow wristband and red socks/Bed in lowest position, wheels locked, appropriate side rails in place/Call bell, personal items and telephone in reach/Instruct patient to call for assistance before getting out of bed or chair/Non-slip footwear when patient is out of bed/[place] to call system/Physically safe environment - no spills, clutter or unnecessary equipment/Purposeful Proactive Rounding/Room/bathroom lighting operational, light cord in reach

## 2022-03-17 NOTE — CONSULT NOTE ADULT - SUBJECTIVE AND OBJECTIVE BOX
VASCULAR SUGERY CONSULT:    HPI:HPI: 81 year old female, PMHx of Anxiety, Paroxysmal AFib on eliquis, 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 She complains of right hip pain, constant, sharp, non-radiating, no alleviating factors, aggravated by movement.    ED COURSE: Fd to have an acute R Hip Intratrochanteric fracture. Additionally supplemental read of CT Chest/A/P found what appears to be an acute aortic dissection but was in fact on further review of prior scans noted to be a chronic finding.                Past Medical History/ Surgical History:  INTERTROCHANTERIC FRACTURE OF RIGHT HIP    ^INTERTROCHANTERIC FRACTURE OF RIGHT HIP    No pertinent family history in first degree relatives    Handoff    MEWS Score    Hypertension    Hyperlipidemia    Hypothyroidism    Intertrochanteric fracture of right hip    No significant past surgical history    FALL    27    [email or web address]      Allergies:No Known Allergies    Medications:aspirin 81 mg oral tablet: 1 tab(s) orally once a day  Crestor 10 mg oral tablet: 1 tab(s) orally once a day  dronedarone 400 mg oral tablet: 1 tab(s) orally 2 times a day  Eliquis 2.5 mg oral tablet: 1 tab(s) orally 2 times a day  levothyroxine 50 mcg (0.05 mg) oral tablet: 1 tab(s) orally once a day  LORazepam 0.5 mg oral tablet: 1 tab(s) orally 3 times a day, As Needed  Metoprolol Succinate ER 50 mg oral tablet, extended release: 1 tab(s) orally once a day  mirtazapine 30 mg oral tablet: 1 tab(s) orally once a day (at bedtime)  acetaminophen     Tablet .. 975 milliGRAM(s) Oral every 6 hours PRN  aspirin  chewable 81 milliGRAM(s) Oral daily  atorvastatin 40 milliGRAM(s) Oral at bedtime  dronedarone 400 milliGRAM(s) Oral two times a day  heparin   Injectable 5000 Unit(s) SubCutaneous every 8 hours  HYDROmorphone  Injectable 1 milliGRAM(s) IV Push every 4 hours PRN  levothyroxine 50 MICROGram(s) Oral daily  LORazepam     Tablet 0.5 milliGRAM(s) Oral three times a day PRN  metoprolol succinate ER 50 milliGRAM(s) Oral daily  mirtazapine 30 milliGRAM(s) Oral at bedtime  senna 2 Tablet(s) Oral at bedtime      Physical Exam:  Vitals:T(C): 36.6 (03-16-22 @ 22:32), Max: 36.6 (03-16-22 @ 22:32)  HR: 72 (03-17-22 @ 04:47) (68 - 76)  BP: 124/57 (03-17-22 @ 04:47) (124/57 - 145/66)  RR: 18 (03-17-22 @ 04:47) (18 - 18)  SpO2: 95% (03-17-22 @ 04:47) (95% - 99%)    General: Alert and oriented times 3 , Not in acute distress   Heart: Regular rate and rhythm , no rubs murmurs or gallops  Lungs: Clear to auscultation , no wheezes , rales rhonci or adventicious breath sounds  Abdomen: Soft , positive bowel sounds, non tender, non distended, no peritoneal signs  Extemities: right hip fracture  warm, capillary refill, no swelling , no edema, good motor and sensation positive pulses                  12.2   9.82  )-----------( 218      ( 03-16 @ 22:57 )             38.2                    143   |  101   |  27                 Ca: 9.1    BMP:   ----------------------------< 93     Mg: x     (03-16-22 @ 22:57)             4.4    |  32    | 0.8                Ph: x        LFT:     TPro: 6.5 / Alb: 3.7 / TBili: <0.2 / DBili: x / AST: 14 / ALT: 10 / AlkPhos: 74   (03-16-22 @ 22:57)          PT/INR - ( 16 Mar 2022 22:57 )   PT: 13.90 sec;   INR: 1.21 ratio         PTT - ( 16 Mar 2022 22:57 )  PTT:35.1 sec        < from: CT Abdomen and Pelvis w/ IV Cont (03.17.22 @ 00:49) >  OTHER: There is a focal dissection//vascular channel involving proximal    abdominal aorta (3/60). There appears to be a channel arising from the   covered right renal artery stent and extending intothe lumbar arteries   (5/298). Atherosclerotic disease of aorta and its branches is noted.   Suprarenal abdominal aortic aneurysm measuring up to 4.9 cm (5/274). This   is distal to the aortic stent.      IMPRESSION:      Right intertrochanteric fracture as above.    Trace left pleural effusion.    Patient is status post descending thoracic aorta repair, with interval   resolution of previously noted large penetrating ulcer.    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   There appears to be a channel arising from the covered right renal artery   stent and extending into the lumbar arteries (5/298). Although it is not   clear whether the contrast is filling this channel from the lumbar   arteries or from the right renal artery.      < end of copied text >

## 2022-03-17 NOTE — CONSULT NOTE ADULT - ASSESSMENT
* Patient-based characteristics (Functional capacity)  Patient is able to achieve more than 4 MET (walk 4 blocks, climb 2 flights of stairs, etc...)          Y [X] / N []; however, activity is limited    High-risk patient:   Recent (<30 days) or active MI          Y [] / N [X]                                    Unstable or severe angina          Y [] / N [X]                                    Decompensated heart failure, or worsening or new-onset heart failure          Y [] / N [X]                                    Severe valvular disease          Y [] / N [X]                                    Significant arrhythmia (Tachy- or Bradyarrhythmia)          Y [] / N [X]    * Surgery/Procedure-based characteristics (Type of surgery)  - Elevated or Moderate-risk procedure (Inpatient)          Y [X] / N []      * IMPRESSION & RECOMMENDATIONS:  Moderate to high-risk patient for a Moderate-risk surgery/procedure  Check 2D echo for further risk stratification  F/U with vascular regarding aortic disease    - This consult serves only as a allie-operative cardiac risk stratification and evaluation to predict 30-days cardiac complications risk and mortality. The decision to proceed with the surgery/procedure is made by the performing physician and the patient - * Patient-based characteristics (Functional capacity)  Patient is able to achieve more than 4 MET (walk 4 blocks, climb 2 flights of stairs, etc...)          Y [X] / N []; however, activity is limited    High-risk patient:   Recent (<30 days) or active MI          Y [] / N [X]                                    Unstable or severe angina          Y [] / N [X]                                    Decompensated heart failure, or worsening or new-onset heart failure          Y [] / N [X]                                    Severe valvular disease          Y [] / N [X]                                    Significant arrhythmia (Tachy- or Bradyarrhythmia)          Y [] / N [X]    * Surgery/Procedure-based characteristics (Type of surgery)  - Elevated or Moderate-risk procedure (Inpatient)          Y [X] / N []      * IMPRESSION & RECOMMENDATIONS:  Moderate to high-risk patient for a Moderate-risk surgery/procedure  Check 2D echo for further risk stratification  Holding anticoagulation for surgery; restart as soon and safe as possible  Continue Toprol and Dronedarone  F/U with vascular regarding aortic disease    - This consult serves only as a allie-operative cardiac risk stratification and evaluation to predict 30-days cardiac complications risk and mortality. The decision to proceed with the surgery/procedure is made by the performing physician and the patient - * Patient-based characteristics (Functional capacity)  Patient is able to achieve more than 4 MET (walk 4 blocks, climb 2 flights of stairs, etc...)          Y [X] / N []; however, activity is limited    High-risk patient:   Recent (<30 days) or active MI          Y [] / N [X]                                    Unstable or severe angina          Y [] / N [X]                                    Decompensated heart failure, or worsening or new-onset heart failure          Y [] / N [X]                                    Severe valvular disease          Y [] / N [X]                                    Significant arrhythmia (Tachy- or Bradyarrhythmia)          Y [] / N [X]    * Surgery/Procedure-based characteristics (Type of surgery)  - Elevated or Moderate-risk procedure (Inpatient)          Y [X] / N []      * IMPRESSION & RECOMMENDATIONS:  High-risk patient for a Moderate-risk surgery/procedure  Check 2D echo   Holding anticoagulation for surgery; restart as soon and safe as possible  Continue Toprol and Dronedarone  F/U with vascular regarding aortic disease    - This consult serves only as a allie-operative cardiac risk stratification and evaluation to predict 30-days cardiac complications risk and mortality. The decision to proceed with the surgery/procedure is made by the performing physician and the patient - * Patient-based characteristics (Functional capacity)  Patient is able to achieve more than 4 MET (walk 4 blocks, climb 2 flights of stairs, etc...)          Y [] / N [X]    High-risk patient:  Recent (<30 days) or active MI          Y [] / N [X]  Unstable or severe angina          Y [] / N [X]  Decompensated heart failure, or worsening or new-onset heart failure          Y [] / N [X]  Severe valvular disease          Y [] / N [X]  Significant arrhythmia (Tachy- or Bradyarrhythmia)          Y [] / N [X]    * Surgery/Procedure-based characteristics (Type of surgery)  - Elevated or Moderate-risk procedure (Inpatient)          Y [X] / N []      * IMPRESSION & RECOMMENDATIONS:  High-risk patient for a Moderate-risk surgery/procedure  Check 2D echo   Holding anticoagulation for surgery; restart as soon and safe as possible  Continue Toprol and Dronedarone  F/U with vascular regarding aortic disease

## 2022-03-17 NOTE — H&P ADULT - ATTENDING COMMENTS
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    #Mechanical Fall  #Right hip fracture  - Plan for ORIF tomorrow  - Per ACC guidelines, pt would be moderate risk for moderate risk procedure. Per cardio recds, echocardiogram to be done today for further eval  - PT/Rehab post-procedure    # Aortic Dissection s/p Repair   No intervention warranted per vascular  - Monitor    # h/o AVR,  #Chronic A Fib on Eliquis at home  - Hold Eliquis, c/w Multac, c/w Metoprolol  - Check TTE    # h/o Hypothyroidism  - Check TSH  - c/w Synthroid QD    # h/o Anxiety  - c/w Mirtazapine  - c/w Alprazolam TID PRN    DVT PPX, Heparin    #Progress Note Handoff  Pending (specify): ORIF, PT  Family discussion: d/w pt and family by bedside regarding pending procedure  Disposition: Home

## 2022-03-17 NOTE — H&P ADULT - HISTORY OF PRESENT ILLNESS
HPI: 81 year old female, PMHx of anxiety, HTN, HLD, hypothyroidism, presents s/p fall out of bed onto her right side. She complains of right hip pain, constant, sharp, non-radiating, no alleviating factors, aggravated by movement. Patient is on Eliquis HPI: 81 year old female, PMHx of Anxiety, Paroxysmal AFib on eliquis , HTN, HLD, Hypothyroidism, Anxiety, Aortic Valve Replacement, presents s/p fall out of bed onto her right side. She complains of right hip pain, constant, sharp, non-radiating, no alleviating factors, aggravated by movement.    ED COURSE: Fd to have an acute R Hip Intratrochanteric fracture.    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 She complains of right hip pain, constant, sharp, non-radiating, no alleviating factors, aggravated by movement.    ED COURSE: Fd to have an acute R Hip Intratrochanteric fracture. Additionally supplemental read of CT Chest/A/P found what appears to be an acute aortic dissection. Vascular consult pending at this time.    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 She complains of right hip pain, constant, sharp, non-radiating, no alleviating factors, aggravated by movement.    ED COURSE: Fd to have an acute R Hip Intratrochanteric fracture. Additionally supplemental read of CT Chest/A/P found what appears to be an acute aortic dissection but was in fact on further review of prior scans noted to be a chronic finding.  Vascular consult pending at this time.

## 2022-03-17 NOTE — PRE PROCEDURE NOTE - PRE PROCEDURE EVALUATION
ORTHOPEDIC SURGERY PRE OP NOTE      Diagnosis: r hip fracture     Planned Procedure: orif     Consent: TO BE OBTAINED BY ATTENDING                   Risks/benefits/alternatives were discussed with the patient/family and they wish to proceed with surgery.   pt has capacity to sign consent     ANTICIPATED DATE OF PROCEDURE : 3/18  SCHEDULED CASE OR ADD-ON CASE: booked       Consent: to be obtained in preop by the surgeon     Clearances:  [***]   cards and medicine     [***] Other:    T(C): 36.1 (03-17-22 @ 08:22), Max: 36.6 (03-16-22 @ 22:32)  HR: 62 (03-17-22 @ 08:22) (62 - 76)  BP: 103/52 (03-17-22 @ 08:22) (103/52 - 145/66)  RR: 18 (03-17-22 @ 08:22) (18 - 18)  SpO2: 95% (03-17-22 @ 08:22) (95% - 99%)    Labs:                        10.1   12.10 )-----------( 197      ( 17 Mar 2022 11:00 )             33.1     03-17    140  |  100  |  26<H>  ----------------------------<  117<H>  5.1<H>   |  32  |  0.8    Ca    8.6      17 Mar 2022 11:00    TPro  6.5  /  Alb  3.7  /  TBili  <0.2  /  DBili  x   /  AST  14  /  ALT  10  /  AlkPhos  74  03-16    PT/INR - ( 17 Mar 2022 11:00 )   PT: 12.30 sec;   INR: 1.07 ratio         PTT - ( 16 Mar 2022 22:57 )  PTT:35.1 sec  Type and Screen X 2:    COVID-19 PCR: NotDetec (16 Mar 2022 22:57)  COVID-19 PCR: NotDetec (18 Feb 2022 08:25)    [ ]Pregnancy test ( if female of childbearing age) : ***  [ ]EKG: on chart   [ ]CXR: napd       DIET: NPO after midnight  IVF: per primary team      ANTICOAGULATION STATUS ( include name of anticoagulant) :  [***] CONTINUE (**name of anticoagulant) hep sq   [***] DISCONTINUE(** name of anticoagulant)                                           A/P: Patient is a 81y y/o Female Pending ****** tomorrow    [ ] -NPO and IVF @ midnight  [ ]pain control/analgesia prn per primary team   [ ]Incentive Spirometry   [ ]F/U Clearance  [ ]F/U Pending Labs  [ ]Notify Ortho with any questions- spectra [id number]    [ ]DISCUSSED WITH PRIMARY TEAM MEMBER (name of team member): ****  [ ]Date and Time DISCUSSED WITH PRIMARY TEAM MEMBER: ****

## 2022-03-17 NOTE — CONSULT NOTE ADULT - SUBJECTIVE AND OBJECTIVE BOX
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 She complains of right hip pain, constant, sharp, non-radiating, no alleviating factors, aggravated by movement.    ED COURSE: Fd to have an acute R Hip Intratrochanteric fracture. Additionally supplemental read of CT Chest/A/P found what appears to be an acute aortic dissection but was in fact on further review of prior scans noted to be a chronic finding.  Vascular consult pending at this time.    (17 Mar 2022 03:54)      Cardiology:   Cardiology consulted for pre-op cardiac risk stratification and optimization. History also obtained from daughter over the phone.   Patient is able to climb 2 flight of stairs but has limited activity due to dementia. Patient denies chest pain, shortness of breath, palpitations or any other significant symptoms    PAST MEDICAL & SURGICAL HISTORY  Hypertension    Hyperlipidemia    Hypothyroidism    FAMILY HISTORY:  FAMILY HISTORY:      SOCIAL HISTORY:  []smoker  []Alcohol  []Drug    ALLERGIES:  No Known Allergies      MEDICATIONS:  MEDICATIONS  (STANDING):  aspirin  chewable 81 milliGRAM(s) Oral daily  atorvastatin 40 milliGRAM(s) Oral at bedtime  dronedarone 400 milliGRAM(s) Oral two times a day  heparin   Injectable 5000 Unit(s) SubCutaneous every 8 hours  levothyroxine 50 MICROGram(s) Oral daily  metoprolol succinate ER 50 milliGRAM(s) Oral daily  mirtazapine 30 milliGRAM(s) Oral at bedtime  senna 2 Tablet(s) Oral at bedtime    MEDICATIONS  (PRN):  acetaminophen     Tablet .. 975 milliGRAM(s) Oral every 6 hours PRN Mild Pain (1 - 3)  HYDROmorphone  Injectable 1 milliGRAM(s) IV Push every 4 hours PRN Severe Pain (7 - 10)  LORazepam     Tablet 0.5 milliGRAM(s) Oral three times a day PRN Anxiety      HOME MEDICATIONS:  Home Medications:  aspirin 81 mg oral tablet: 1 tab(s) orally once a day (17 Mar 2022 04:31)  Crestor 10 mg oral tablet: 1 tab(s) orally once a day (17 Mar 2022 04:31)  dronedarone 400 mg oral tablet: 1 tab(s) orally 2 times a day (17 Mar 2022 04:31)  Eliquis 2.5 mg oral tablet: 1 tab(s) orally 2 times a day (17 Mar 2022 04:31)  levothyroxine 50 mcg (0.05 mg) oral tablet: 1 tab(s) orally once a day (17 Mar 2022 04:31)  LORazepam 0.5 mg oral tablet: 1 tab(s) orally 3 times a day, As Needed (17 Mar 2022 04:31)  Metoprolol Succinate ER 50 mg oral tablet, extended release: 1 tab(s) orally once a day (17 Mar 2022 04:31)  mirtazapine 30 mg oral tablet: 1 tab(s) orally once a day (at bedtime) (17 Mar 2022 04:31)      VITALS:   T(F): 97 (03-17 @ 08:22), Max: 97.8 (03-16 @ 22:32)  HR: 62 (03-17 @ 08:22) (62 - 76)  BP: 103/52 (03-17 @ 08:22) (103/52 - 145/66)  BP(mean): 72 (03-17 @ 04:47) (72 - 72)  RR: 18 (03-17 @ 08:22) (18 - 18)  SpO2: 95% (03-17 @ 08:22) (95% - 99%)    I&O's Summary      REVIEW OF SYSTEMS:  CONSTITUTIONAL: No weakness, fevers or chills  EYES: No visual changes  ENT: No vertigo or throat pain   NECK: No pain or stiffness  RESPIRATORY: No cough, wheezing, hemoptysis; No shortness of breath  CARDIOVASCULAR: No chest pain or palpitations  GASTROINTESTINAL: No abdominal or epigastric pain. No nausea, vomiting, or hematemesis; No diarrhea or constipation. No melena or hematochezia.  GENITOURINARY: No dysuria, frequency or hematuria  NEUROLOGICAL: No numbness or weakness  SKIN: No itching, no rashes  MSK: Fall; hip pain    PHYSICAL EXAM:  NEURO: patient is awake , alert and oriented x2  GEN: Not in acute distress  NECK: no thyroid enlargement, no JVD  LUNGS: Clear to auscultation bilaterally   CARDIOVASCULAR: S1/S2 present, RRR , systolic murmur  ABD: Soft  EXT: No ALKA  SKIN: Intact    LABS:                        12.2   9.82  )-----------( 218      ( 16 Mar 2022 22:57 )             38.2     03-16    143  |  101  |  27<H>  ----------------------------<  93  4.4   |  32  |  0.8    Ca    9.1      16 Mar 2022 22:57    TPro  6.5  /  Alb  3.7  /  TBili  <0.2  /  DBili  x   /  AST  14  /  ALT  10  /  AlkPhos  74  03-16    PT/INR - ( 16 Mar 2022 22:57 )   PT: 13.90 sec;   INR: 1.21 ratio         PTT - ( 16 Mar 2022 22:57 )  PTT:35.1 sec  Lactate, Blood: 1.0 mmol/L (03-16-22 @ 22:57)          Troponin trend:            RADIOLOGY:  -CXR:    -TTE in 1/2020: EF 66%, bioprosthetic aortic valve with ETHEL of 1, mean gradient of 15    -CCTA:  -STRESS TEST:  -CATHETERIZATION:    < from: CT Chest w/ IV Cont (03.17.22 @ 00:49) >  IMPRESSION:      Right intertrochanteric fracture as above.    Trace left pleural effusion.    Patient is status post descending thoracic aorta repair, with interval   resolution of previously noted large penetrating ulcer.    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   There appears to be a channel arising from the covered right renal artery   stent and extending into the lumbar arteries (5/298). Although it is not   clear whether the contrast is filling this channel from the lumbar   arteries or from the right renal artery.    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 She complains of right hip pain, constant, sharp, non-radiating, no alleviating factors, aggravated by movement.    ED COURSE: Fd to have an acute R Hip Intratrochanteric fracture. Additionally supplemental read of CT Chest/A/P found what appears to be an acute aortic dissection but was in fact on further review of prior scans noted to be a chronic finding.  Vascular consult pending at this time.    (17 Mar 2022 03:54)      Cardiology:   Cardiology consulted for pre-op cardiac risk stratification and optimization. History also obtained from daughter over the phone.   Patient is able to climb 2 flight of stairs but has limited activity due to dementia. Patient denies chest pain, shortness of breath, palpitations or any other significant symptoms      PAST MEDICAL & SURGICAL HISTORY  Hypertension    Hyperlipidemia    Hypothyroidism      No pertinent family history of premature CAD in first degree relatives  SOCIAL HISTORY: Denies EtOH, smoking or drug use    ALLERGIES:  No Known Allergies      MEDICATIONS:  MEDICATIONS  (STANDING):  aspirin  chewable 81 milliGRAM(s) Oral daily  atorvastatin 40 milliGRAM(s) Oral at bedtime  dronedarone 400 milliGRAM(s) Oral two times a day  heparin   Injectable 5000 Unit(s) SubCutaneous every 8 hours  levothyroxine 50 MICROGram(s) Oral daily  metoprolol succinate ER 50 milliGRAM(s) Oral daily  mirtazapine 30 milliGRAM(s) Oral at bedtime  senna 2 Tablet(s) Oral at bedtime    MEDICATIONS  (PRN):  acetaminophen     Tablet .. 975 milliGRAM(s) Oral every 6 hours PRN Mild Pain (1 - 3)  HYDROmorphone  Injectable 1 milliGRAM(s) IV Push every 4 hours PRN Severe Pain (7 - 10)  LORazepam     Tablet 0.5 milliGRAM(s) Oral three times a day PRN Anxiety      HOME MEDICATIONS:  Home Medications:  aspirin 81 mg oral tablet: 1 tab(s) orally once a day (17 Mar 2022 04:31)  Crestor 10 mg oral tablet: 1 tab(s) orally once a day (17 Mar 2022 04:31)  dronedarone 400 mg oral tablet: 1 tab(s) orally 2 times a day (17 Mar 2022 04:31)  Eliquis 2.5 mg oral tablet: 1 tab(s) orally 2 times a day (17 Mar 2022 04:31)  levothyroxine 50 mcg (0.05 mg) oral tablet: 1 tab(s) orally once a day (17 Mar 2022 04:31)  LORazepam 0.5 mg oral tablet: 1 tab(s) orally 3 times a day, As Needed (17 Mar 2022 04:31)  Metoprolol Succinate ER 50 mg oral tablet, extended release: 1 tab(s) orally once a day (17 Mar 2022 04:31)  mirtazapine 30 mg oral tablet: 1 tab(s) orally once a day (at bedtime) (17 Mar 2022 04:31)      VITALS:   T(F): 97 (03-17 @ 08:22), Max: 97.8 (03-16 @ 22:32)  HR: 62 (03-17 @ 08:22) (62 - 76)  BP: 103/52 (03-17 @ 08:22) (103/52 - 145/66)  BP(mean): 72 (03-17 @ 04:47) (72 - 72)  RR: 18 (03-17 @ 08:22) (18 - 18)  SpO2: 95% (03-17 @ 08:22) (95% - 99%)      REVIEW OF SYSTEMS:  CONSTITUTIONAL: No weakness, fevers or chills  EYES: No visual changes  ENT: No vertigo or throat pain   NECK: No pain or stiffness  RESPIRATORY: No cough, wheezing, hemoptysis; No shortness of breath  CARDIOVASCULAR: No chest pain or palpitations  GASTROINTESTINAL: No abdominal or epigastric pain. No nausea, vomiting, or hematemesis; No diarrhea or constipation. No melena or hematochezia.  GENITOURINARY: No dysuria, frequency or hematuria  NEUROLOGICAL: No numbness or weakness  SKIN: No itching, no rashes  MSK: Fall; hip pain    PHYSICAL EXAM:  NEURO: patient is awake, alert and oriented x2  GEN: Not in acute distress  NECK: no JVD  LUNGS: Clear to auscultation bilaterally   CARDIOVASCULAR: S1/S2 present, RRR, systolic murmur  ABD: Soft  EXT: No ALKA  SKIN: Intact      LABS:                        12.2   9.82  )-----------( 218      ( 16 Mar 2022 22:57 )             38.2     03-16    143  |  101  |  27<H>  ----------------------------<  93  4.4   |  32  |  0.8    Ca    9.1      16 Mar 2022 22:57    TPro  6.5  /  Alb  3.7  /  TBili  <0.2  /  DBili  x   /  AST  14  /  ALT  10  /  AlkPhos  74  03-16    PT/INR - ( 16 Mar 2022 22:57 )   PT: 13.90 sec;   INR: 1.21 ratio    PTT - ( 16 Mar 2022 22:57 )  PTT:35.1 sec      -TTE in 1/2020: EF 66%, bioprosthetic aortic valve with ETHEL of 1, mean gradient of 15      < from: CT Chest w/ IV Cont (03.17.22 @ 00:49) >  IMPRESSION:      Right intertrochanteric fracture as above.    Trace left pleural effusion.    Patient is status post descending thoracic aorta repair, with interval   resolution of previously noted large penetrating ulcer.    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   There appears to be a channel arising from the covered right renal artery   stent and extending into the lumbar arteries (5/298). Although it is not   clear whether the contrast is filling this channel from the lumbar   arteries or from the right renal artery.    < end of copied text >

## 2022-03-17 NOTE — PRE-ANESTHESIA EVALUATION ADULT - NSANTHPMHFT_GEN_ALL_CORE
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     ED COURSE: R Hip Intratrochanteric fracture. Additionally supplemental read of CT Chest/A/P found what appears to be an acute aortic dissection but was in fact on further review of prior scans noted to be a chronic finding.     #Mechanical Fall  #Right hip fracture  - Plan for ORIF tomorrow  - Per ACC guidelines, pt would be moderate risk for moderate risk procedure. Per cardio recds, echocardiogram to be done today for further eval.    # Aortic Dissection s/p Repair   No intervention warranted per vascular  - Monitor    # h/o AVR,  #Chronic A Fib on Eliquis at home  - Hold Eliquis, c/w Multac, c/w Metoprolol  - Pending TTE today    # h/o Hypothyroidism  - Check TSH  - c/w Synthroid QD    # h/o Anxiety  - c/w Mirtazapine  - c/w Alprazolam TID PRN

## 2022-03-17 NOTE — ED ADULT NURSE REASSESSMENT NOTE - NS ED NURSE REASSESS COMMENT FT1
endorsed to day shift RN , s/p fall , fall precaution , fall alarm  on , AO  x 4 , denies numbness , no SOB

## 2022-03-17 NOTE — H&P ADULT - ASSESSMENT
ASSESSMENT  80 YO F w/PMHx of AVR, pAFib, Hypothyroidism, Anxiety, HTN, DLD, Mild Dementia, presented to the ED s/p fall out of bed fd to have a R Intratrochanteric Femur Fx.     IMPRESSION  # s/p Fall  # Acute Intratrochanteric R Femur Fx  # h/o AVR, A Fib on Eliquis at home  # h/o Hypothyroidism  # h/o Anxiety  # h/o HTN    PLAN  # s/p Fall  # Acute Intratrochanteric R Femur Fx  - Preop Med Risk Strat, Cardio Risk Strat    # h/o AVR, A Fib on Eliquis at home  - Hold Eliquis, c/w Multac, c/w Metoprolol  - Check TTE    # h/o Hypothyroidism  - Check TSH  - c/w Synthroid QD    # h/o Anxiety  - c/w Mirtazapine  - c/w Alprazolam TID PRN    DVT PPX: HSQ  DISPO: Acute ASSESSMENT  80 YO F w/PMHx of AVR, pAFib, Hypothyroidism, Anxiety, HTN, DLD, Mild Dementia, presented to the ED s/p fall out of bed fd to have a R Intratrochanteric Femur Fx.     IMPRESSION  # s/p Fall  # Acute Intratrochanteric R Femur Fx  # Possible Acute Bella II Aortic Dissection  # h/o AVR, A Fib on Eliquis at home  # h/o Hypothyroidism  # h/o Anxiety  # h/o HTN    PLAN  # s/p Fall  # Acute Intratrochanteric R Femur Fx  - Preop Med Risk Strat, Cardio Risk Strat    # Type II Aortic Dissection, possibly acute  - pending vascular consult  - repeat CT A/P dissection protocol  - Tight BP and HR Control, HR < 60 if acute, will need Critical Care    # h/o AVR, A Fib on Eliquis at home  - Hold Eliquis, c/w Multac, c/w Metoprolol  - Check TTE    # h/o Hypothyroidism  - Check TSH  - c/w Synthroid QD    # h/o Anxiety  - c/w Mirtazapine  - c/w Alprazolam TID PRN    DVT PPX: HSQ  DISPO: Acute ASSESSMENT  82 YO F w/PMHx of AVR, pAFib, Hypothyroidism, Anxiety, HTN, DLD, Mild Dementia, presented to the ED s/p fall out of bed fd to have a R Intratrochanteric Femur Fx.     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

## 2022-03-18 LAB
ALBUMIN SERPL ELPH-MCNC: 3.4 G/DL — LOW (ref 3.5–5.2)
ALP SERPL-CCNC: 69 U/L — SIGNIFICANT CHANGE UP (ref 30–115)
ALT FLD-CCNC: 10 U/L — SIGNIFICANT CHANGE UP (ref 0–41)
ANION GAP SERPL CALC-SCNC: 7 MMOL/L — SIGNIFICANT CHANGE UP (ref 7–14)
APPEARANCE UR: CLEAR — SIGNIFICANT CHANGE UP
APTT BLD: 33 SEC — SIGNIFICANT CHANGE UP (ref 27–39.2)
AST SERPL-CCNC: 13 U/L — SIGNIFICANT CHANGE UP (ref 0–41)
BASOPHILS # BLD AUTO: 0.03 K/UL — SIGNIFICANT CHANGE UP (ref 0–0.2)
BASOPHILS NFR BLD AUTO: 0.3 % — SIGNIFICANT CHANGE UP (ref 0–1)
BILIRUB SERPL-MCNC: 0.4 MG/DL — SIGNIFICANT CHANGE UP (ref 0.2–1.2)
BILIRUB UR-MCNC: NEGATIVE — SIGNIFICANT CHANGE UP
BUN SERPL-MCNC: 27 MG/DL — HIGH (ref 10–20)
CALCIUM SERPL-MCNC: 8.6 MG/DL — SIGNIFICANT CHANGE UP (ref 8.5–10.1)
CHLORIDE SERPL-SCNC: 99 MMOL/L — SIGNIFICANT CHANGE UP (ref 98–110)
CO2 SERPL-SCNC: 33 MMOL/L — HIGH (ref 17–32)
COLOR SPEC: YELLOW — SIGNIFICANT CHANGE UP
CREAT SERPL-MCNC: 0.8 MG/DL — SIGNIFICANT CHANGE UP (ref 0.7–1.5)
DIFF PNL FLD: NEGATIVE — SIGNIFICANT CHANGE UP
EGFR: 74 ML/MIN/1.73M2 — SIGNIFICANT CHANGE UP
EOSINOPHIL # BLD AUTO: 0.2 K/UL — SIGNIFICANT CHANGE UP (ref 0–0.7)
EOSINOPHIL NFR BLD AUTO: 2 % — SIGNIFICANT CHANGE UP (ref 0–8)
GLUCOSE SERPL-MCNC: 114 MG/DL — HIGH (ref 70–99)
GLUCOSE UR QL: NEGATIVE — SIGNIFICANT CHANGE UP
HCT VFR BLD CALC: 28.9 % — LOW (ref 37–47)
HCT VFR BLD CALC: 31.9 % — LOW (ref 37–47)
HGB BLD-MCNC: 8.5 G/DL — LOW (ref 12–16)
HGB BLD-MCNC: 9.7 G/DL — LOW (ref 12–16)
IMM GRANULOCYTES NFR BLD AUTO: 0.5 % — HIGH (ref 0.1–0.3)
INR BLD: 1.04 RATIO — SIGNIFICANT CHANGE UP (ref 0.65–1.3)
KETONES UR-MCNC: NEGATIVE — SIGNIFICANT CHANGE UP
LEUKOCYTE ESTERASE UR-ACNC: NEGATIVE — SIGNIFICANT CHANGE UP
LYMPHOCYTES # BLD AUTO: 0.45 K/UL — LOW (ref 1.2–3.4)
LYMPHOCYTES # BLD AUTO: 4.6 % — LOW (ref 20.5–51.1)
MAGNESIUM SERPL-MCNC: 2.4 MG/DL — SIGNIFICANT CHANGE UP (ref 1.8–2.4)
MCHC RBC-ENTMCNC: 27.9 PG — SIGNIFICANT CHANGE UP (ref 27–31)
MCHC RBC-ENTMCNC: 28.7 PG — SIGNIFICANT CHANGE UP (ref 27–31)
MCHC RBC-ENTMCNC: 29.4 G/DL — LOW (ref 32–37)
MCHC RBC-ENTMCNC: 30.4 G/DL — LOW (ref 32–37)
MCV RBC AUTO: 94.4 FL — SIGNIFICANT CHANGE UP (ref 81–99)
MCV RBC AUTO: 94.8 FL — SIGNIFICANT CHANGE UP (ref 81–99)
MONOCYTES # BLD AUTO: 0.53 K/UL — SIGNIFICANT CHANGE UP (ref 0.1–0.6)
MONOCYTES NFR BLD AUTO: 5.4 % — SIGNIFICANT CHANGE UP (ref 1.7–9.3)
NEUTROPHILS # BLD AUTO: 8.57 K/UL — HIGH (ref 1.4–6.5)
NEUTROPHILS NFR BLD AUTO: 87.2 % — HIGH (ref 42.2–75.2)
NITRITE UR-MCNC: NEGATIVE — SIGNIFICANT CHANGE UP
NRBC # BLD: 0 /100 WBCS — SIGNIFICANT CHANGE UP (ref 0–0)
NRBC # BLD: 0 /100 WBCS — SIGNIFICANT CHANGE UP (ref 0–0)
PH UR: 6 — SIGNIFICANT CHANGE UP (ref 5–8)
PLATELET # BLD AUTO: 186 K/UL — SIGNIFICANT CHANGE UP (ref 130–400)
PLATELET # BLD AUTO: 198 K/UL — SIGNIFICANT CHANGE UP (ref 130–400)
POTASSIUM SERPL-MCNC: 5.1 MMOL/L — HIGH (ref 3.5–5)
POTASSIUM SERPL-SCNC: 5.1 MMOL/L — HIGH (ref 3.5–5)
PROT SERPL-MCNC: 6.3 G/DL — SIGNIFICANT CHANGE UP (ref 6–8)
PROT UR-MCNC: SIGNIFICANT CHANGE UP
PROTHROM AB SERPL-ACNC: 12 SEC — SIGNIFICANT CHANGE UP (ref 9.95–12.87)
RBC # BLD: 3.05 M/UL — LOW (ref 4.2–5.4)
RBC # BLD: 3.38 M/UL — LOW (ref 4.2–5.4)
RBC # FLD: 13.9 % — SIGNIFICANT CHANGE UP (ref 11.5–14.5)
RBC # FLD: 14 % — SIGNIFICANT CHANGE UP (ref 11.5–14.5)
SODIUM SERPL-SCNC: 139 MMOL/L — SIGNIFICANT CHANGE UP (ref 135–146)
SP GR SPEC: 1.05 — HIGH (ref 1.01–1.03)
UROBILINOGEN FLD QL: SIGNIFICANT CHANGE UP
WBC # BLD: 14.39 K/UL — HIGH (ref 4.8–10.8)
WBC # BLD: 9.83 K/UL — SIGNIFICANT CHANGE UP (ref 4.8–10.8)
WBC # FLD AUTO: 14.39 K/UL — HIGH (ref 4.8–10.8)
WBC # FLD AUTO: 9.83 K/UL — SIGNIFICANT CHANGE UP (ref 4.8–10.8)

## 2022-03-18 PROCEDURE — 99233 SBSQ HOSP IP/OBS HIGH 50: CPT

## 2022-03-18 RX ORDER — MIRTAZAPINE 45 MG/1
30 TABLET, ORALLY DISINTEGRATING ORAL AT BEDTIME
Refills: 0 | Status: DISCONTINUED | OUTPATIENT
Start: 2022-03-18 | End: 2022-03-21

## 2022-03-18 RX ORDER — SENNA PLUS 8.6 MG/1
2 TABLET ORAL AT BEDTIME
Refills: 0 | Status: DISCONTINUED | OUTPATIENT
Start: 2022-03-18 | End: 2022-03-21

## 2022-03-18 RX ORDER — SODIUM CHLORIDE 9 MG/ML
1000 INJECTION, SOLUTION INTRAVENOUS
Refills: 0 | Status: DISCONTINUED | OUTPATIENT
Start: 2022-03-18 | End: 2022-03-18

## 2022-03-18 RX ORDER — CEFAZOLIN SODIUM 1 G
1000 VIAL (EA) INJECTION EVERY 8 HOURS
Refills: 0 | Status: COMPLETED | OUTPATIENT
Start: 2022-03-18 | End: 2022-03-19

## 2022-03-18 RX ORDER — ACETAMINOPHEN 500 MG
975 TABLET ORAL EVERY 6 HOURS
Refills: 0 | Status: DISCONTINUED | OUTPATIENT
Start: 2022-03-18 | End: 2022-03-21

## 2022-03-18 RX ORDER — MORPHINE SULFATE 50 MG/1
1 CAPSULE, EXTENDED RELEASE ORAL
Refills: 0 | Status: DISCONTINUED | OUTPATIENT
Start: 2022-03-18 | End: 2022-03-18

## 2022-03-18 RX ORDER — ASPIRIN/CALCIUM CARB/MAGNESIUM 324 MG
81 TABLET ORAL DAILY
Refills: 0 | Status: DISCONTINUED | OUTPATIENT
Start: 2022-03-18 | End: 2022-03-21

## 2022-03-18 RX ORDER — HYDROMORPHONE HYDROCHLORIDE 2 MG/ML
1 INJECTION INTRAMUSCULAR; INTRAVENOUS; SUBCUTANEOUS EVERY 4 HOURS
Refills: 0 | Status: DISCONTINUED | OUTPATIENT
Start: 2022-03-18 | End: 2022-03-21

## 2022-03-18 RX ORDER — ATORVASTATIN CALCIUM 80 MG/1
40 TABLET, FILM COATED ORAL AT BEDTIME
Refills: 0 | Status: DISCONTINUED | OUTPATIENT
Start: 2022-03-18 | End: 2022-03-21

## 2022-03-18 RX ORDER — DRONEDARONE 400 MG/1
400 TABLET, FILM COATED ORAL
Refills: 0 | Status: DISCONTINUED | OUTPATIENT
Start: 2022-03-18 | End: 2022-03-21

## 2022-03-18 RX ORDER — LEVOTHYROXINE SODIUM 125 MCG
50 TABLET ORAL DAILY
Refills: 0 | Status: DISCONTINUED | OUTPATIENT
Start: 2022-03-18 | End: 2022-03-21

## 2022-03-18 RX ORDER — SODIUM ZIRCONIUM CYCLOSILICATE 10 G/10G
5 POWDER, FOR SUSPENSION ORAL ONCE
Refills: 0 | Status: DISCONTINUED | OUTPATIENT
Start: 2022-03-18 | End: 2022-03-18

## 2022-03-18 RX ORDER — METOPROLOL TARTRATE 50 MG
50 TABLET ORAL DAILY
Refills: 0 | Status: DISCONTINUED | OUTPATIENT
Start: 2022-03-18 | End: 2022-03-21

## 2022-03-18 RX ADMIN — Medication 975 MILLIGRAM(S): at 17:59

## 2022-03-18 RX ADMIN — Medication 975 MILLIGRAM(S): at 10:48

## 2022-03-18 RX ADMIN — MIRTAZAPINE 30 MILLIGRAM(S): 45 TABLET, ORALLY DISINTEGRATING ORAL at 21:17

## 2022-03-18 RX ADMIN — DRONEDARONE 400 MILLIGRAM(S): 400 TABLET, FILM COATED ORAL at 05:35

## 2022-03-18 RX ADMIN — DRONEDARONE 400 MILLIGRAM(S): 400 TABLET, FILM COATED ORAL at 17:34

## 2022-03-18 RX ADMIN — Medication 50 MILLIGRAM(S): at 05:35

## 2022-03-18 RX ADMIN — SENNA PLUS 2 TABLET(S): 8.6 TABLET ORAL at 21:16

## 2022-03-18 RX ADMIN — Medication 975 MILLIGRAM(S): at 17:42

## 2022-03-18 RX ADMIN — Medication 50 MICROGRAM(S): at 05:35

## 2022-03-18 RX ADMIN — ATORVASTATIN CALCIUM 40 MILLIGRAM(S): 80 TABLET, FILM COATED ORAL at 21:16

## 2022-03-18 RX ADMIN — Medication 975 MILLIGRAM(S): at 11:48

## 2022-03-18 NOTE — PROGRESS NOTE ADULT - SUBJECTIVE AND OBJECTIVE BOX
Patient is a 81y old  Female who presents with a chief complaint of Fall (18 Mar 2022 07:06)    Patient was seen and examined.  Plannned for right hip ORIF today  Pain controlled with pain meds    PAST MEDICAL & SURGICAL HISTORY:  Hypertension  Hyperlipidemia  Hypothyroidism    No significant past surgical history    Allergies  No Known Allergies    MEDICATIONS  (STANDING):  aspirin  chewable 81 milliGRAM(s) Oral daily  atorvastatin 40 milliGRAM(s) Oral at bedtime  dronedarone 400 milliGRAM(s) Oral two times a day  heparin   Injectable 5000 Unit(s) SubCutaneous every 8 hours  levothyroxine 50 MICROGram(s) Oral daily  metoprolol succinate ER 50 milliGRAM(s) Oral daily  mirtazapine 30 milliGRAM(s) Oral at bedtime  senna 2 Tablet(s) Oral at bedtime    MEDICATIONS  (PRN):  acetaminophen     Tablet .. 975 milliGRAM(s) Oral every 6 hours PRN Mild Pain (1 - 3)  HYDROmorphone  Injectable 1 milliGRAM(s) IV Push every 4 hours PRN Severe Pain (7 - 10)  LORazepam     Tablet 0.5 milliGRAM(s) Oral three times a day PRN Anxiety    Vital Signs Last 24 Hrs  T(C): 36.8  T(F): 98.3  HR: 61  BP: 115/54  BP(mean): --  RR: 18  SpO2: 97%    O/E:  Awake, alert, not in distress.  HEENT: atraumatic, EOMI.  Chest: clear.  CVS: SIS2 +, no murmur.  P/A: Soft, BS+  CNS: awake alert, not oriented  Ext: no edema feet. right hip tenderness+  Skin: no rash, no ulcers.  All systems reviewed positive findings as above.                          9.7<L>  9.83  )-----------( 186      ( 18 Mar 2022 05:23 )             31.9<L>                        10.1<L>  12.10<H> )-----------( 197      ( 17 Mar 2022 11:00 )             33.1<L>    03    139  |  99  |  27<H>  ----------------------------<  114<H>  5.1<H>   |  33<H>  |  0.8  03    140  |  100  |  26<H>  ----------------------------<  117<H>  5.1<H>   |  32  |  0.8    Ca    8.6      18 Mar 2022 05:23  Ca    8.6      17 Mar 2022 11:00  Ca    9.1      16 Mar 2022 22:57  Mg     2.4     -    TPro  6.3  /  Alb  3.4<L>  /  TBili  0.4  /  DBili  x   /  AST  13  /  ALT  10  /  AlkPhos  69  -18  TPro  6.5  /  Alb  3.7  /  TBili  <0.2  /  DBili  x   /  AST  14  /  ALT  10  /  AlkPhos  74  03-16          PT/INR - ( 18 Mar 2022 05:23 )   PT: 12.00 sec;   INR: 1.04 ratio         PTT - ( 18 Mar 2022 05:23 )  PTT:33.0 sec  Urinalysis Basic - ( 18 Mar 2022 07:40 )    Color: Yellow / Appearance: Clear / S.050 / pH: x  Gluc: x / Ketone: Negative  / Bili: Negative / Urobili: <2 mg/dL   Blood: x / Protein: Trace / Nitrite: Negative   Leuk Esterase: Negative / RBC: x / WBC x   Sq Epi: x / Non Sq Epi: x / Bacteria: x

## 2022-03-18 NOTE — PROGRESS NOTE ADULT - ASSESSMENT
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    #Mechanical Fall  #Right hip fracture  - Xray significant for right intertrochanteric fracture with mild varus angulation. Lesser trochanter is displaced medially by approximately 1 cm.   - Ortho is following, ORIF today 3/18  - Cardiac risk stratification done by cardio   - High-risk patient for a Moderate-risk surgery/procedure  - ECHO onde on 3/17 noted above, shows EF of 50%  - Holding anticoagulation for surgery  - At baseline patient can climb 2 flight of stairs but has limited activity due to dementia.  - PT/Rehab post-procedure    #Aortic Dissection s/p Repair   - No intervention warranted per vascular  - Patient was seen by vascular in July 2021  - S/p open arch and ascending aortic aneurysm repair, and had a large DTA dissection and aneurysm.   - S/p TEVAR and right renal stenting for addressing the issue.  - Monitor and outpatient vascular follow up     #H/o AVR  #Chronic A Fib on Eliquis at home  - Hold Eliquis, c/w Multac, c/w Metoprolol  - TTE on 3/17 shows EF of 50%    #H/o Hypothyroidism  - Check TSH  - c/w Synthroid QD    #H/o Anxiety  - c/w Mirtazapine  - c/w Alprazolam TID PRN    #Misc   - DVT prophylaxis: Heparin sq  - GI prophylaxis: no indicated   - Diet: NPO for OR   - Activity status: Bedrest   - Disposition: Acute   Pending: ORIF

## 2022-03-18 NOTE — PROGRESS NOTE ADULT - SUBJECTIVE AND OBJECTIVE BOX
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 She complains of right hip pain, constant, sharp, non-radiating, no alleviating factors, aggravated by movement.    ED COURSE: Fd to have an acute R Hip Intratrochanteric fracture. Additionally supplemental read of CT Chest/A/P found what appears to be an acute aortic dissection but was in fact on further review of prior scans noted to be a chronic finding.           Today is hospital day 1d.     INTERVAL HOSPITAL COURSE / OVERNIGHT EVENTS:    Patient was examined and seen at bedside. This morning she is resting comfortably in bed and reports no new issues or overnight events.     Review of Systems: Otherwise unremarkable     <<<<<PAST MEDICAL & SURGICAL HISTORY>>>>>  Hypertension    Hyperlipidemia    Hypothyroidism    No significant past surgical history      ALLERGIES  No Known Allergies      Home Medications:  aspirin 81 mg oral tablet: 1 tab(s) orally once a day (17 Mar 2022 04:31)  Crestor 10 mg oral tablet: 1 tab(s) orally once a day (17 Mar 2022 04:31)  dronedarone 400 mg oral tablet: 1 tab(s) orally 2 times a day (17 Mar 2022 04:31)  Eliquis 2.5 mg oral tablet: 1 tab(s) orally 2 times a day (17 Mar 2022 04:31)  levothyroxine 50 mcg (0.05 mg) oral tablet: 1 tab(s) orally once a day (17 Mar 2022 04:31)  LORazepam 0.5 mg oral tablet: 1 tab(s) orally 3 times a day, As Needed (17 Mar 2022 04:31)  Metoprolol Succinate ER 50 mg oral tablet, extended release: 1 tab(s) orally once a day (17 Mar 2022 04:31)  mirtazapine 30 mg oral tablet: 1 tab(s) orally once a day (at bedtime) (17 Mar 2022 04:31)        MEDICATIONS  STANDING MEDICATIONS  aspirin  chewable 81 milliGRAM(s) Oral daily  atorvastatin 40 milliGRAM(s) Oral at bedtime  dronedarone 400 milliGRAM(s) Oral two times a day  heparin   Injectable 5000 Unit(s) SubCutaneous every 8 hours  levothyroxine 50 MICROGram(s) Oral daily  metoprolol succinate ER 50 milliGRAM(s) Oral daily  mirtazapine 30 milliGRAM(s) Oral at bedtime  senna 2 Tablet(s) Oral at bedtime    PRN MEDICATIONS  acetaminophen     Tablet .. 975 milliGRAM(s) Oral every 6 hours PRN  HYDROmorphone  Injectable 1 milliGRAM(s) IV Push every 4 hours PRN  LORazepam     Tablet 0.5 milliGRAM(s) Oral three times a day PRN    VITALS:  T(F): 96.5  HR: 68  BP: 131/61  RR: 18  SpO2: 97%    <<<<<LABS>>>>>                        10.1   12.10 )-----------( 197      ( 17 Mar 2022 11:00 )             33.1     03-17    140  |  100  |  26<H>  ----------------------------<  117<H>  5.1<H>   |  32  |  0.8    Ca    8.6      17 Mar 2022 11:00    TPro  6.5  /  Alb  3.7  /  TBili  <0.2  /  DBili  x   /  AST  14  /  ALT  10  /  AlkPhos  74  03-16    PT/INR - ( 17 Mar 2022 11:00 )   PT: 12.30 sec;   INR: 1.07 ratio         PTT - ( 16 Mar 2022 22:57 )  PTT:35.1 sec        [id number]        <<<<<RADIOLOGY>>>>>  < from: Xray Femur 2 Views, Right (03.17.22 @ 01:16) >  IMPRESSION:    Right intertrochanteric fracture with mild varus angulation. Displaced   lesser trochanter fracture. Prior total knee replacement with intact   hardware.    Degenerative are changes are present.    --- Endof Report ---    < end of copied text >    < from: Xray Hip 2-3 Views, Right (03.17.22 @ 01:16) >  Findings/  impression:    There is a right intertrochanteric fracture with mild varus angulation.   Lesser trochanter is displaced medially by approximately 1 cm. No other   fractures are identified. There are severe right and moderate to severe   left hip degenerative changes.        --- End of Report ---    < end of copied text >    < from: Xray Knee 1 or 2 Views, Right (03.17.22 @ 01:17) >  IMPRESSION:    There is no acute fracture.  There is no dislocation. Prior total knee   replacement, with intact hardware. Diffuse osteopenia.    --- End of Report ---    < end of copied text >        <<<<<PHYSICAL EXAM>>>>>  GENERAL: Well developed, well nourished and in no acute distress. Resting comfortably in bed.  PULMONARY: Clear to auscultation bilaterally. No rales, rhonchi, or wheezing.  CARDIOVASCULAR: Regular rate and rhythm, S1-S2, no murmurs  GASTROINTESTINAL: Soft, non-tender, non-distended, no guarding.  SKIN/EXTREMITIES: No clubbing or edema  NEUROLOGIC/MUSCULOSKELETAL: AOx4, grossly moving all extremities, no focal deficits.      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 She complains of right hip pain, constant, sharp, non-radiating, no alleviating factors, aggravated by movement.    ED COURSE: Fd to have an acute R Hip Intratrochanteric fracture. Additionally supplemental read of CT Chest/A/P found what appears to be an acute aortic dissection but was in fact on further review of prior scans noted to be a chronic finding.           Today is hospital day 1d.     INTERVAL HOSPITAL COURSE / OVERNIGHT EVENTS:    Patient was examined and seen at bedside. This morning she is resting comfortably in bed and reports no new issues or overnight events.     Review of Systems: Patient is endorsing right hip pain, unable to move, decrease ROM. Patient denies fever, chills, headache, dizziness. Patient denies chest pain, palpitation, SOB.     <<<<<PAST MEDICAL & SURGICAL HISTORY>>>>>  Hypertension    Hyperlipidemia    Hypothyroidism    No significant past surgical history      ALLERGIES  No Known Allergies      Home Medications:  aspirin 81 mg oral tablet: 1 tab(s) orally once a day (17 Mar 2022 04:31)  Crestor 10 mg oral tablet: 1 tab(s) orally once a day (17 Mar 2022 04:31)  dronedarone 400 mg oral tablet: 1 tab(s) orally 2 times a day (17 Mar 2022 04:31)  Eliquis 2.5 mg oral tablet: 1 tab(s) orally 2 times a day (17 Mar 2022 04:31)  levothyroxine 50 mcg (0.05 mg) oral tablet: 1 tab(s) orally once a day (17 Mar 2022 04:31)  LORazepam 0.5 mg oral tablet: 1 tab(s) orally 3 times a day, As Needed (17 Mar 2022 04:31)  Metoprolol Succinate ER 50 mg oral tablet, extended release: 1 tab(s) orally once a day (17 Mar 2022 04:31)  mirtazapine 30 mg oral tablet: 1 tab(s) orally once a day (at bedtime) (17 Mar 2022 04:31)        MEDICATIONS  STANDING MEDICATIONS  aspirin  chewable 81 milliGRAM(s) Oral daily  atorvastatin 40 milliGRAM(s) Oral at bedtime  dronedarone 400 milliGRAM(s) Oral two times a day  heparin   Injectable 5000 Unit(s) SubCutaneous every 8 hours  levothyroxine 50 MICROGram(s) Oral daily  metoprolol succinate ER 50 milliGRAM(s) Oral daily  mirtazapine 30 milliGRAM(s) Oral at bedtime  senna 2 Tablet(s) Oral at bedtime    PRN MEDICATIONS  acetaminophen     Tablet .. 975 milliGRAM(s) Oral every 6 hours PRN  HYDROmorphone  Injectable 1 milliGRAM(s) IV Push every 4 hours PRN  LORazepam     Tablet 0.5 milliGRAM(s) Oral three times a day PRN    VITALS:  T(F): 96.5  HR: 68  BP: 131/61  RR: 18  SpO2: 97%    <<<<<LABS>>>>>                        10.1   12.10 )-----------( 197      ( 17 Mar 2022 11:00 )             33.1     03-17    140  |  100  |  26<H>  ----------------------------<  117<H>  5.1<H>   |  32  |  0.8    Ca    8.6      17 Mar 2022 11:00    TPro  6.5  /  Alb  3.7  /  TBili  <0.2  /  DBili  x   /  AST  14  /  ALT  10  /  AlkPhos  74  03-16    PT/INR - ( 17 Mar 2022 11:00 )   PT: 12.30 sec;   INR: 1.07 ratio         PTT - ( 16 Mar 2022 22:57 )  PTT:35.1 sec        [id number]        <<<<<RADIOLOGY>>>>>  < from: Xray Femur 2 Views, Right (03.17.22 @ 01:16) >  IMPRESSION:    Right intertrochanteric fracture with mild varus angulation. Displaced   lesser trochanter fracture. Prior total knee replacement with intact   hardware.    Degenerative are changes are present.    --- Endof Report ---    < end of copied text >    < from: Xray Hip 2-3 Views, Right (03.17.22 @ 01:16) >  Findings/  impression:    There is a right intertrochanteric fracture with mild varus angulation.   Lesser trochanter is displaced medially by approximately 1 cm. No other   fractures are identified. There are severe right and moderate to severe   left hip degenerative changes.        --- End of Report ---    < end of copied text >    < from: Xray Knee 1 or 2 Views, Right (03.17.22 @ 01:17) >  IMPRESSION:    There is no acute fracture.  There is no dislocation. Prior total knee   replacement, with intact hardware. Diffuse osteopenia.    --- End of Report ---    < end of copied text >        <<<<<PHYSICAL EXAM>>>>>  GENERAL: Well developed, well nourished and in no acute distress. Resting comfortably in bed.  PULMONARY: Clear to auscultation bilaterally. No rales, rhonchi, or wheezing.  CARDIOVASCULAR: Regular rate and rhythm, S1-S2, no murmurs  GASTROINTESTINAL: Soft, non-tender, non-distended, no guarding.  SKIN/EXTREMITIES: decrease ROM of the right Hip joint   NEUROLOGIC/MUSCULOSKELETAL: AOx3, BUT unable to remember past events       -----------------------------------------------------------------------------------------------------------------------------------------------------------------------------------------------

## 2022-03-18 NOTE — PROGRESS NOTE ADULT - ASSESSMENT
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 She complains of right hip pain, constant, sharp, non-radiating, no alleviating factors, aggravated by movement.    # Right hip fracture sec to mechanical fall  - CT Abdomen and Pelvis w/ IV Cont (03.17.22 @ 00:49) >Right intertrochanteric fracture. Trace left pleural effusion.   - NWB RLE   - C/w pain meds  - ORIF today  on 3/18  -cardiac clearance  High-risk patient for a Moderate-risk surgery/procedure    #Aortic Dissection s/p Repair   - CT Abdomen and Pelvis w/ IV Cont (03.17.22 @ 00:49) >Patient is status post descending thoracic aorta repair, with interval   resolution of previously noted large penetrating ulcer. 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   There appears to be a channel arising from the covered right renal artery stent and extending into the lumbar arteries (5/298). Although it is not clear whether the contrast is filling this channel from the lumbar arteries or from the right renal artery.  - vascular surgery : she was post open arch and ascending aortic aneurysm repair, and had a large DTA dissection and aneurysm. Since then she had a TEVAR and right renal stenting for addressing the issue. She is actively seen and followed in OSH. The DTA aneurysm has shrank in size since last imaging and the dissection in the infrarenal part, is not flow limiting to any of the iliac or visceral vessels (is indeed below renals). Infrarenal aorta is <4 cm.  - She can undergo her hip procedure, and FU with her vascular surgeon.  - c/w ASA, statin    # Paroxysmal Afib--> SR  # H/o AVR  - c/w multaq, metoprolol  -  eliquis held    # H/o Hypothyroidism  - c/w synthroid    # H/o Anxiety  - c/w home meds    # Full code    #Pending: ORIF today  # Disposition: STR when stable

## 2022-03-19 ENCOUNTER — TRANSCRIPTION ENCOUNTER (OUTPATIENT)
Age: 82
End: 2022-03-19

## 2022-03-19 LAB
ALBUMIN SERPL ELPH-MCNC: 3.2 G/DL — LOW (ref 3.5–5.2)
ALP SERPL-CCNC: 57 U/L — SIGNIFICANT CHANGE UP (ref 30–115)
ALT FLD-CCNC: 10 U/L — SIGNIFICANT CHANGE UP (ref 0–41)
ANION GAP SERPL CALC-SCNC: 10 MMOL/L — SIGNIFICANT CHANGE UP (ref 7–14)
AST SERPL-CCNC: 13 U/L — SIGNIFICANT CHANGE UP (ref 0–41)
BILIRUB SERPL-MCNC: 0.3 MG/DL — SIGNIFICANT CHANGE UP (ref 0.2–1.2)
BUN SERPL-MCNC: 29 MG/DL — HIGH (ref 10–20)
CALCIUM SERPL-MCNC: 8.2 MG/DL — LOW (ref 8.5–10.1)
CHLORIDE SERPL-SCNC: 99 MMOL/L — SIGNIFICANT CHANGE UP (ref 98–110)
CO2 SERPL-SCNC: 29 MMOL/L — SIGNIFICANT CHANGE UP (ref 17–32)
CREAT SERPL-MCNC: 0.6 MG/DL — LOW (ref 0.7–1.5)
EGFR: 91 ML/MIN/1.73M2 — SIGNIFICANT CHANGE UP
GLUCOSE SERPL-MCNC: 120 MG/DL — HIGH (ref 70–99)
HCT VFR BLD CALC: 25.6 % — LOW (ref 37–47)
HCT VFR BLD CALC: 26 % — LOW (ref 37–47)
HGB BLD-MCNC: 7.7 G/DL — LOW (ref 12–16)
HGB BLD-MCNC: 7.9 G/DL — LOW (ref 12–16)
MAGNESIUM SERPL-MCNC: 2.2 MG/DL — SIGNIFICANT CHANGE UP (ref 1.8–2.4)
MCHC RBC-ENTMCNC: 28 PG — SIGNIFICANT CHANGE UP (ref 27–31)
MCHC RBC-ENTMCNC: 28.1 PG — SIGNIFICANT CHANGE UP (ref 27–31)
MCHC RBC-ENTMCNC: 30.1 G/DL — LOW (ref 32–37)
MCHC RBC-ENTMCNC: 30.4 G/DL — LOW (ref 32–37)
MCV RBC AUTO: 92.2 FL — SIGNIFICANT CHANGE UP (ref 81–99)
MCV RBC AUTO: 93.4 FL — SIGNIFICANT CHANGE UP (ref 81–99)
NRBC # BLD: 0 /100 WBCS — SIGNIFICANT CHANGE UP (ref 0–0)
NRBC # BLD: 0 /100 WBCS — SIGNIFICANT CHANGE UP (ref 0–0)
PLATELET # BLD AUTO: 187 K/UL — SIGNIFICANT CHANGE UP (ref 130–400)
PLATELET # BLD AUTO: 250 K/UL — SIGNIFICANT CHANGE UP (ref 130–400)
POTASSIUM SERPL-MCNC: 4.9 MMOL/L — SIGNIFICANT CHANGE UP (ref 3.5–5)
POTASSIUM SERPL-SCNC: 4.9 MMOL/L — SIGNIFICANT CHANGE UP (ref 3.5–5)
PROT SERPL-MCNC: 5.7 G/DL — LOW (ref 6–8)
RBC # BLD: 2.74 M/UL — LOW (ref 4.2–5.4)
RBC # BLD: 2.82 M/UL — LOW (ref 4.2–5.4)
RBC # FLD: 13.9 % — SIGNIFICANT CHANGE UP (ref 11.5–14.5)
RBC # FLD: 14 % — SIGNIFICANT CHANGE UP (ref 11.5–14.5)
SARS-COV-2 RNA SPEC QL NAA+PROBE: SIGNIFICANT CHANGE UP
SODIUM SERPL-SCNC: 138 MMOL/L — SIGNIFICANT CHANGE UP (ref 135–146)
WBC # BLD: 12.8 K/UL — HIGH (ref 4.8–10.8)
WBC # BLD: 14.28 K/UL — HIGH (ref 4.8–10.8)
WBC # FLD AUTO: 12.8 K/UL — HIGH (ref 4.8–10.8)
WBC # FLD AUTO: 14.28 K/UL — HIGH (ref 4.8–10.8)

## 2022-03-19 PROCEDURE — 99233 SBSQ HOSP IP/OBS HIGH 50: CPT

## 2022-03-19 RX ORDER — HEPARIN SODIUM 5000 [USP'U]/ML
5000 INJECTION INTRAVENOUS; SUBCUTANEOUS EVERY 8 HOURS
Refills: 0 | Status: DISCONTINUED | OUTPATIENT
Start: 2022-03-19 | End: 2022-03-20

## 2022-03-19 RX ADMIN — DRONEDARONE 400 MILLIGRAM(S): 400 TABLET, FILM COATED ORAL at 05:21

## 2022-03-19 RX ADMIN — HEPARIN SODIUM 5000 UNIT(S): 5000 INJECTION INTRAVENOUS; SUBCUTANEOUS at 21:08

## 2022-03-19 RX ADMIN — Medication 975 MILLIGRAM(S): at 11:50

## 2022-03-19 RX ADMIN — Medication 81 MILLIGRAM(S): at 11:17

## 2022-03-19 RX ADMIN — ATORVASTATIN CALCIUM 40 MILLIGRAM(S): 80 TABLET, FILM COATED ORAL at 21:07

## 2022-03-19 RX ADMIN — HEPARIN SODIUM 5000 UNIT(S): 5000 INJECTION INTRAVENOUS; SUBCUTANEOUS at 05:27

## 2022-03-19 RX ADMIN — Medication 50 MICROGRAM(S): at 05:22

## 2022-03-19 RX ADMIN — Medication 100 MILLIGRAM(S): at 17:07

## 2022-03-19 RX ADMIN — Medication 100 MILLIGRAM(S): at 04:48

## 2022-03-19 RX ADMIN — MIRTAZAPINE 30 MILLIGRAM(S): 45 TABLET, ORALLY DISINTEGRATING ORAL at 21:07

## 2022-03-19 RX ADMIN — HEPARIN SODIUM 5000 UNIT(S): 5000 INJECTION INTRAVENOUS; SUBCUTANEOUS at 13:16

## 2022-03-19 RX ADMIN — Medication 100 MILLIGRAM(S): at 11:18

## 2022-03-19 RX ADMIN — Medication 50 MILLIGRAM(S): at 05:22

## 2022-03-19 RX ADMIN — DRONEDARONE 400 MILLIGRAM(S): 400 TABLET, FILM COATED ORAL at 17:07

## 2022-03-19 RX ADMIN — Medication 975 MILLIGRAM(S): at 11:17

## 2022-03-19 NOTE — DISCHARGE NOTE PROVIDER - ATTENDING DISCHARGE PHYSICAL EXAMINATION:
T(C): 36.1 (03-21-22 @ 09:25), Max: 36.1 (03-21-22 @ 09:25)  HR: 73 (03-21-22 @ 09:25) (70 - 74)  BP: 147/64 (03-21-22 @ 09:25) (102/55 - 147/64)  RR: 18 (03-21-22 @ 09:25) (18 - 18)  SpO2: --    O/E:  Awake, alert, not in distress.  HEENT: atraumatic, EOMI.  Chest: clear.  CVS: SIS2 +, no murmur.  P/A: Soft, BS+  CNS: awake alert, not oriented  Ext: no edema feet. right hip dressing+  Skin: no rash, no ulcers.  All systems reviewed positive findings as above.

## 2022-03-19 NOTE — PROGRESS NOTE ADULT - ASSESSMENT
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 She complains of right hip pain, constant, sharp, non-radiating, no alleviating factors, aggravated by movement.    # Right hip fracture sec to mechanical fall  - CT Abdomen and Pelvis w/ IV Cont (03.17.22 @ 00:49) >Right intertrochanteric fracture. Trace left pleural effusion.   - S/p R hip IMN on 3/18/22  - WBAT RLE  - C/w pain meds  - physiatry/PT eval    # Normocytic anemia sec to acute blood loss  - Start ferrous sulphate    #Aortic Dissection s/p Repair   - CT Abdomen and Pelvis w/ IV Cont (03.17.22 @ 00:49) >Patient is status post descending thoracic aorta repair, with interval   resolution of previously noted large penetrating ulcer. 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   There appears to be a channel arising from the covered right renal artery stent and extending into the lumbar arteries (5/298). Although it is not clear whether the contrast is filling this channel from the lumbar arteries or from the right renal artery.  - vascular surgery : she was post open arch and ascending aortic aneurysm repair, and had a large DTA dissection and aneurysm. Since then she had a TEVAR and right renal stenting for addressing the issue. She is actively seen and followed in OSH. The DTA aneurysm has shrank in size since last imaging and the dissection in the infrarenal part, is not flow limiting to any of the iliac or visceral vessels (is indeed below renals). Infrarenal aorta is <4 cm.  - She can undergo her hip procedure, and FU with her vascular surgeon.  - c/w ASA, statin    # Paroxysmal Afib--> SR  # H/o AVR  - c/w multaq, metoprolol  - restart eliquis    # urinary retention  - TOV after PT eval    # H/o Hypothyroidism  - c/w synthroid    # H/o Anxiety  - c/w home meds    # Full code    # Pending:  PT eval, physiatry eval, TOV  # Disposition: STR when stable

## 2022-03-19 NOTE — PROGRESS NOTE ADULT - SUBJECTIVE AND OBJECTIVE BOX
Orthopaedic Surgery Progress Note    S&E after above procedure. Pt resting comfortably. Pain well controlled. No acute issues reported.    AFVSS    P/E:  NAD, Awake, alert  Nonlabored breathing    Right LE  Dressings c/d/i  SILT sp/dp/t/sural/saph  Firing ta/ehl/fhl/gs  WWP    Labs:                        7.7    12.80 )-----------( 187      ( 19 Mar 2022 04:30 )             25.6     A/P:   Pt in stable condition after R hip IMN on 3/18/22  WBAT RLE  AM labs, trend H/H, transfuse if Hb<7 or Hb<8 AND symptomatic or hemodynamically unstable  DVT ppx: okay to resume chemical DVT PPX; mech ppx with scds  Postop abx for 24 hrs, to be completed today  Incentive spirometry  Diet  Pain control  Home medications  PT/OT/rehab  OOBTC BID with assistance  Monitor dressing

## 2022-03-19 NOTE — PROGRESS NOTE ADULT - SUBJECTIVE AND OBJECTIVE BOX
Orthopaedic Surgery Postoperative Check Note    Procedure: right hip IM nail      S&E after above procedure. Pt resting comfortably. Pain well controlled. No acute issues reported    AFVSS    P/E:  NAD, Awake, alert  Nonlabored breathing    Right LE  Dressings c/d/i  SILT sp/dp/t/sural/saph  Firing ta/ehl/fhl/gs  WWP    Labs:  H/H, 3/18/22 in PACU - 8.5/28.9            A/P:   Pt in stable condition after R hip IMN on 3/18/22  WBAT RLE  AM labs, trend H/H, transfuse if Hb<7 or Hb<8 AND symptomatic or hemodynamically unstable  DVT ppx: okay to resume chemical DVT PPX; mech ppx with scds  Postop abx for 24 hrs  Incentive spirometry  Diet  Pain control  Home medications  PT/OT/rehab  OOBTC BID with assistance  Monitor dressing

## 2022-03-19 NOTE — DISCHARGE NOTE PROVIDER - CARE PROVIDER_API CALL
Yoshi Cardenas)  [facility]  [facility] [address]  [place] [address]  Phone: [phone]  Fax: [phone]  Established Patient  Follow Up Time: 2 weeks

## 2022-03-19 NOTE — PROVIDER CONTACT NOTE (OTHER) - SITUATION
Pt has not voided after removal of Mehta .  Bladder scan done per  57ml . MD would like to another scan done in AM

## 2022-03-19 NOTE — PROGRESS NOTE ADULT - SUBJECTIVE AND OBJECTIVE BOX
Patient is a 81y old  Female who presents with a chief complaint of Fall (18 Mar 2022 07:06)    Patient was seen and examined.  no new events    PAST MEDICAL & SURGICAL HISTORY:  Hypertension  Hyperlipidemia  Hypothyroidism    No significant past surgical history    Allergies  No Known Allergies    MEDICATIONS  (STANDING):  aspirin  chewable 81 milliGRAM(s) Oral daily  atorvastatin 40 milliGRAM(s) Oral at bedtime  ceFAZolin   IVPB 1000 milliGRAM(s) IV Intermittent every 8 hours  dronedarone 400 milliGRAM(s) Oral two times a day  heparin   Injectable 5000 Unit(s) SubCutaneous every 8 hours  levothyroxine 50 MICROGram(s) Oral daily  metoprolol succinate ER 50 milliGRAM(s) Oral daily  mirtazapine 30 milliGRAM(s) Oral at bedtime  senna 2 Tablet(s) Oral at bedtime    MEDICATIONS  (PRN):  acetaminophen     Tablet .. 975 milliGRAM(s) Oral every 6 hours PRN Mild Pain (1 - 3)  HYDROmorphone  Injectable 1 milliGRAM(s) IV Push every 4 hours PRN Severe Pain (7 - 10)  LORazepam     Tablet 0.5 milliGRAM(s) Oral three times a day PRN Anxiety    T(C): 37 (03-19-22 @ 08:40), Max: 37 (03-19-22 @ 08:40)  HR: 74 (03-19-22 @ 08:40) (59 - 74)  BP: 124/56 (03-19-22 @ 08:40) (108/53 - 153/62)  RR: 18 (03-19-22 @ 08:40) (12 - 20)  SpO2: 97% (03-19-22 @ 08:40) (91% - 100%)    O/E:  Awake, alert, not in distress.  HEENT: atraumatic, EOMI.  Chest: clear.  CVS: SIS2 +, no murmur.  P/A: Soft, BS+  CNS: awake alert, not oriented  Ext: no edema feet. right hip dressing+  Skin: no rash, no ulcers.  All systems reviewed positive findings as above.                                 7.7<L>  12.80<H> )-----------( 187      ( 19 Mar 2022 04:30 )             25.6<L>                        8.5<L>  14.39<H> )-----------( 198      ( 18 Mar 2022 20:18 )             28.9<L>  03-19    138  |  99  |  29<H>  ----------------------------<  120<H>  4.9   |  29  |  0.6<L>  03-18    139  |  99  |  27<H>  ----------------------------<  114<H>  5.1<H>   |  33<H>  |  0.8    Ca    8.2<L>      19 Mar 2022 04:30  Ca    8.6      18 Mar 2022 05:23  Mg     2.2     03-19    TPro  5.7<L>  /  Alb  3.2<L>  /  TBili  0.3  /  DBili  x   /  AST  13  /  ALT  10  /  AlkPhos  57  03-19  TPro  6.3  /  Alb  3.4<L>  /  TBili  0.4  /  DBili  x   /  AST  13  /  ALT  10  /  AlkPhos  69  03-18

## 2022-03-19 NOTE — PROGRESS NOTE ADULT - ASSESSMENT
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    #Mechanical Fall  #Right hip fracture  - Xray significant for right intertrochanteric fracture with mild varus angulation. Lesser trochanter is displaced medially by approximately 1 cm.   - Cardiac risk stratification done by cardio   - High-risk patient for a Moderate-risk surgery/procedure  - ECHO onde on 3/17 noted above, shows EF of 50%  - Ortho is following  - s/p R hip IMN on 3/18/22  - Holding anticoagulation for surgery, can be restarted postop day 2  - At baseline patient can climb 2 flight of stairs but has limited activity due to dementia.  - PT/OT/Rehab   - WBAT RLE  - OOBTC BID with assistance    #Aortic Dissection s/p Repair   - No intervention warranted per vascular  - Patient was seen by vascular in July 2021  - S/p open arch and ascending aortic aneurysm repair, and had a large DTA dissection and aneurysm.   - S/p TEVAR and right renal stenting for addressing the issue.  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    #Mechanical Fall  #Right hip fracture  - Xray significant for right intertrochanteric fracture with mild varus angulation. Lesser trochanter is displaced medially by approximately 1 cm.   - Cardiac risk stratification done by cardio   - High-risk patient for a Moderate-risk surgery/procedure  - ECHO onde on 3/17 noted above, shows EF of 50%  - Ortho is following  - s/p R hip IMN on 3/18/22  - Holding anticoagulation for surgery, can be restarted postop day 2  - At baseline patient can climb 2 flight of stairs but has limited activity due to dementia.  - PT/OT/Rehab   - WBAT RLE  - OOBTC BID with assistance  - Eliquis to be restarted on 3/20  - Patient's Hb dropped postop day 1, monitoring H+H, Will transfuse if <7    #Aortic Dissection s/p Repair   - No intervention warranted per vascular  - Patient was seen by vascular in July 2021  - S/p open arch and ascending aortic aneurysm repair, and had a large DTA dissection and aneurysm.   - S/p TEVAR and right renal stenting for addressing the issue.  - Monitor and outpatient vascular follow up     #H/o AVR  #Chronic A Fib on Eliquis at home  - Hold Eliquis, c/w Multac, c/w Metoprolol  - TTE on 3/17 shows EF of 50%  - Can restart Eliquis postop day 2    #H/o Hypothyroidism  - Check TSH  - c/w Synthroid QD    #H/o Anxiety  - c/w Mirtazapine  - c/w Alprazolam TID PRN    #Misc   - DVT prophylaxis: Heparin sq  - GI prophylaxis: no indicated   - Diet: DASH/TLC  - Activity status: WBAT to the R side, OOBTC BID   - Disposition: Acute   Pending: PT/OT/Rehab, CBC 16:00, Eliquis to be restarted on 3/20

## 2022-03-19 NOTE — DISCHARGE NOTE PROVIDER - NSDCCPCAREPLAN_GEN_ALL_CORE_FT
PRINCIPAL DISCHARGE DIAGNOSIS  Diagnosis: Intertrochanteric fracture of right hip  Assessment and Plan of Treatment: You had a hip frecture after a fall. It was fix by the ortho team. Please follow up with them as outpatient for further management.         SECONDARY DISCHARGE DIAGNOSES  Diagnosis: Fall  Assessment and Plan of Treatment: Fall prevention includes ways to make your home and other areas safer. It also includes ways you can move more carefully to prevent a fall. Health conditions that cause changes in your blood pressure, vision, or muscle strength and coordination may increase your risk for falls. Medicines may also increase your risk for falls if they make you dizzy, weak, or sleepy.  SEEK IMMEDIATE MEDICAL ATTENTION IF: You have fallen and are unconscious, you have fallen and cannot move part of your body, or you have fallen and have pain or a headache.  FALL PREVENTION TIPS:  Stand or sit up slowly. Use assistive devices as directed. You may need to have grab bars put in your bathroom near the toilet or in the shower.   Wear shoes that fit well and have soles that . Wear shoes both inside and outside. Do not wear shoes with high heels.   Wear a personal alarm that can call 911 in an emergency.   Manage your medical conditions. Keep all appointments with your healthcare providers. Visit your eye doctor as directed.  HOME SAFETY TIPS:  Put nonslip strips on your bath or shower floor to prevent you from  Use a shower seat so you do not need to stand while you shower. Sit on the toilet or a chair in your bathroom to dry yourself and put on clothing. This will prevent you from losing your balance from drying or dressing yourself while you are standing.   Keep paths clear. Remove books, shoes, and other objects from walkways and stairs. Place cords for telephones and lamps out of the way so that you do not need to walk over them. Tape them down if you cannot move them. Remove small rugs or secure it with double-sided tape to prevent you from   Install bright lights in your home. Use night lights to help light paths to the bathroom or kitchen. Always turn on the light before you start walking.   Keep items you use often on shelves within reach. Do not use a step stool to help you reach an item.   Place reflective tape on the edges of your stairs to see better.

## 2022-03-19 NOTE — PHYSICAL THERAPY INITIAL EVALUATION ADULT - GENERAL OBSERVATIONS, REHAB EVAL
Pt encountered supine in bed in NAD, +sullivan, Rt hip dressing c/d/i, c/o Rt hip pain 8/10 but agreeable to participate with PT. MESSI Nick medicated pt. Pt performed bed mobility supine/sit to EOB with Max A, transfer mobility Mod A and amb 3 ft Mod A using RW. Pt demonstrated limited amb secondary RLE weakness and Rt hip pain. Pt left seated in chair in NAD, +chair alarm, RN aware.

## 2022-03-19 NOTE — DISCHARGE NOTE PROVIDER - NSDCFUADDINST_GEN_ALL_CORE_FT
FALL PREVENTION TIPS:  Stand or sit up slowly. Use assistive devices as directed. You may need to have grab bars put in your bathroom near the toilet or in the shower.  Wear shoes that fit well and have soles that . Wear shoes both inside and outside. Do not wear shoes with high heels.  Wear a personal alarm that can call 911 in an emergency.  Manage your medical conditions. Keep all appointments with your healthcare providers. Visit your eye doctor as directed.   FALL PREVENTION TIPS:  Stand or sit up slowly. Use assistive devices as directed. You may need to have grab bars put in your bathroom near the toilet or in the shower.  Wear shoes that fit well and have soles that . Wear shoes both inside and outside. Do not wear shoes with high heels.  Wear a personal alarm that can call 911 in an emergency.  Manage your medical conditions. Keep all appointments with your healthcare providers. Visit your eye doctor as directed.   **Patient is being discharged with a Mehta, please do Bladder scan and discontinue Mehta if patient passes trial of void**

## 2022-03-19 NOTE — DISCHARGE NOTE PROVIDER - HOSPITAL COURSE
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    #Mechanical Fall  #Right hip fracture  - Xray significant for right intertrochanteric fracture with mild varus angulation. Lesser trochanter is displaced medially by approximately 1 cm.   - Cardiac risk stratification done by cardio   - High-risk patient for a Moderate-risk surgery/procedure  - ECHO onde on 3/17 noted above, shows EF of 50%  - Ortho is following  - s/p R hip IMN on 3/18/22  - Holding anticoagulation for surgery, can be restarted postop day 2  - At baseline patient can climb 2 flight of stairs but has limited activity due to dementia.  - PT/OT/Rehab   - WBAT RLE  - OOBTC BID with assistance  - Eliquis to be restarted on 3/20  - Patient's Hb dropped postop day 1, monitoring H+H, Will transfuse if <7    #Aortic Dissection s/p Repair   - No intervention warranted per vascular  - Patient was seen by vascular in July 2021  - S/p open arch and ascending aortic aneurysm repair, and had a large DTA dissection and aneurysm.   - S/p TEVAR and right renal stenting for addressing the issue.  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    #Mechanical Fall  #Right hip fracture  - Xray significant for right intertrochanteric fracture with mild varus angulation. Lesser trochanter is displaced medially by approximately 1 cm.   - Cardiac risk stratification done by cardio   - High-risk patient for a Moderate-risk surgery/procedure  - ECHO onde on 3/17 noted above, shows EF of 50%  - Ortho is following  - s/p R hip IMN on 3/18/22  - Holding anticoagulation for surgery, can be restarted postop day 2  - At baseline patient can climb 2 flight of stairs but has limited activity due to dementia.  - PT/OT/Rehab   - WBAT RLE  - OOBTC BID with assistance    #Aortic Dissection s/p Repair   - No intervention warranted per vascular  - Patient was seen by vascular in July 2021  - S/p open arch and ascending aortic aneurysm repair, and had a large DTA dissection and aneurysm.   - S/p TEVAR and right renal stenting for addressing the issue.  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    #Mechanical Fall  #Right hip fracture  - Xray significant for right intertrochanteric fracture with mild varus angulation. Lesser trochanter is displaced medially by approximately 1 cm.   - Cardiac risk stratification done by cardio   - High-risk patient for a Moderate-risk surgery/procedure  - ECHO onde on 3/17 noted above, shows EF of 50%  - Ortho is following  - s/p R hip IMN on 3/18/22  - Holding anticoagulation for surgery, can be restarted postop day 2  - At baseline patient can climb 2 flight of stairs but has limited activity due to dementia.  - PT/OT/Rehab   - WBAT RLE  - OOBTC BID with assistance    #Urinary retention   - Bladder scan - 800cc urinary retention   - Mehta placed   - Patient being discharged with Mehta in.   - TOV     #Aortic Dissection s/p Repair   - No intervention warranted per vascular  - Patient was seen by vascular in July 2021  - S/p open arch and ascending aortic aneurysm repair, and had a large DTA dissection and aneurysm.   - S/p TEVAR and right renal stenting for addressing the issue.  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      # Right hip fracture sec to mechanical fall  - CT Abdomen and Pelvis w/ IV Cont (03.17.22 @ 00:49) >Right intertrochanteric fracture. Trace left pleural effusion.   - S/p R hip IMN on 3/18/22  - WBAT RLE  - C/w pain meds  - evaluated by PT--> STR    # Normocytic anemia sec to acute blood loss  - c/w  ferrous sulphate  - HB/HCT stable    #Aortic Dissection s/p Repair   - CT Abdomen and Pelvis w/ IV Cont (03.17.22 @ 00:49) >Patient is status post descending thoracic aorta repair, with interval   resolution of previously noted large penetrating ulcer. 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   There appears to be a channel arising from the covered right renal artery stent and extending into the lumbar arteries (5/298). Although it is not clear whether the contrast is filling this channel from the lumbar arteries or from the right renal artery.  - vascular surgery : she was post open arch and ascending aortic aneurysm repair, and had a large DTA dissection and aneurysm. Since then she had a TEVAR and right renal stenting for addressing the issue. She is actively seen and followed in OSH. The DTA aneurysm has shrank in size since last imaging and the dissection in the infrarenal part, is not flow limiting to any of the iliac or visceral vessels (is indeed below renals). Infrarenal aorta is <4 cm.  - She can undergo her hip procedure, and FU with her vascular surgeon.  - c/w ASA, statin    # Paroxysmal Afib--> SR  # H/o AVR  - c/w multaq, metoprolol  - c/w  eliquis    # urinary retention  -  bladder scan -->800cc urinary retention   - Mehta placed   - Patient being discharged with Mehta in.   - TOV at STR    # H/o Hypothyroidism  - c/w synthroid    # H/o Anxiety  - c/w home meds

## 2022-03-19 NOTE — PROGRESS NOTE ADULT - SUBJECTIVE AND OBJECTIVE BOX
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 She complains of right hip pain, constant, sharp, non-radiating, no alleviating factors, aggravated by movement.    ED COURSE: Fd to have an acute R Hip Intratrochanteric fracture. Additionally supplemental read of CT Chest/A/P found what appears to be an acute aortic dissection but was in fact on further review of prior scans noted to be a chronic finding.       Today is hospital day 2d.     INTERVAL HOSPITAL COURSE / OVERNIGHT EVENTS:    Patient was examined and seen at bedside. This morning she is resting comfortably in bed and reports no new issues or overnight events.     Review of Systems: Patient is endorsing right hip pain, unable to move, decrease ROM. Patient denies fever, chills, headache, dizziness. Patient denies chest pain, palpitation, SOB.       <<<<<PAST MEDICAL & SURGICAL HISTORY>>>>>  Hypertension    Hyperlipidemia    Hypothyroidism    No significant past surgical history      ALLERGIES  No Known Allergies      Home Medications:  aspirin 81 mg oral tablet: 1 tab(s) orally once a day (17 Mar 2022 04:31)  Crestor 10 mg oral tablet: 1 tab(s) orally once a day (17 Mar 2022 04:31)  dronedarone 400 mg oral tablet: 1 tab(s) orally 2 times a day (17 Mar 2022 04:31)  Eliquis 2.5 mg oral tablet: 1 tab(s) orally 2 times a day (17 Mar 2022 04:31)  levothyroxine 50 mcg (0.05 mg) oral tablet: 1 tab(s) orally once a day (17 Mar 2022 04:31)  LORazepam 0.5 mg oral tablet: 1 tab(s) orally 3 times a day, As Needed (17 Mar 2022 04:31)  Metoprolol Succinate ER 50 mg oral tablet, extended release: 1 tab(s) orally once a day (17 Mar 2022 04:31)  mirtazapine 30 mg oral tablet: 1 tab(s) orally once a day (at bedtime) (17 Mar 2022 04:31)        MEDICATIONS  STANDING MEDICATIONS  aspirin  chewable 81 milliGRAM(s) Oral daily  atorvastatin 40 milliGRAM(s) Oral at bedtime  ceFAZolin   IVPB 1000 milliGRAM(s) IV Intermittent every 8 hours  dronedarone 400 milliGRAM(s) Oral two times a day  heparin   Injectable 5000 Unit(s) SubCutaneous every 8 hours  levothyroxine 50 MICROGram(s) Oral daily  metoprolol succinate ER 50 milliGRAM(s) Oral daily  mirtazapine 30 milliGRAM(s) Oral at bedtime  senna 2 Tablet(s) Oral at bedtime    PRN MEDICATIONS  acetaminophen     Tablet .. 975 milliGRAM(s) Oral every 6 hours PRN  HYDROmorphone  Injectable 1 milliGRAM(s) IV Push every 4 hours PRN  LORazepam     Tablet 0.5 milliGRAM(s) Oral three times a day PRN    VITALS:  T(F): 97.5  HR: 70  BP: 130/58  RR: 18  SpO2: 100%    <<<<<LABS>>>>>                        7.7    12.80 )-----------( 187      ( 19 Mar 2022 04:30 )             25.6     03-19    138  |  99  |  29<H>  ----------------------------<  120<H>  4.9   |  29  |  0.6<L>    Ca    8.2<L>      19 Mar 2022 04:30  Mg     2.2     03-19    TPro  5.7<L>  /  Alb  3.2<L>  /  TBili  0.3  /  DBili  x   /  AST  13  /  ALT  10  /  AlkPhos  57  03-19    PT/INR - ( 18 Mar 2022 05:23 )   PT: 12.00 sec;   INR: 1.04 ratio         PTT - ( 18 Mar 2022 05:23 )  PTT:33.0 sec  Urinalysis Basic - ( 18 Mar 2022 07:40 )    Color: Yellow / Appearance: Clear / S.050 / pH: x  Gluc: x / Ketone: Negative  / Bili: Negative / Urobili: <2 mg/dL   Blood: x / Protein: Trace / Nitrite: Negative   Leuk Esterase: Negative / RBC: x / WBC x   Sq Epi: x / Non Sq Epi: x / Bacteria: x          [id number]        <<<<<RADIOLOGY>>>>>    < from: Xray Femur 2 Views, Right (22 @ 01:16) >  IMPRESSION:    Right intertrochanteric fracture with mild varus angulation. Displaced   lesser trochanter fracture. Prior total knee replacement with intact   hardware.    Degenerative are changes are present.    --- Endof Report ---    < end of copied text >    < from: Xray Hip 2-3 Views, Right (22 @ 01:16) >  Findings/  impression:    There is a right intertrochanteric fracture with mild varus angulation.   Lesser trochanter is displaced medially by approximately 1 cm. No other   fractures are identified. There are severe right and moderate to severe   left hip degenerative changes.        --- End of Report ---    < end of copied text >    < from: Xray Knee 1 or 2 Views, Right (22 @ 01:17) >  IMPRESSION:    There is no acute fracture.  There is no dislocation. Prior total knee   replacement, with intact hardware. Diffuse osteopenia.    --- End of Report ---    < end of copied text >        <<<<<PHYSICAL EXAM>>>>>  GENERAL: Well developed, well nourished and in no acute distress. Resting comfortably in bed.  PULMONARY: Clear to auscultation bilaterally. No rales, rhonchi, or wheezing.  CARDIOVASCULAR: Regular rate and rhythm, S1-S2, no murmurs  GASTROINTESTINAL: Soft, non-tender, non-distended, no guarding.  SKIN/EXTREMITIES: decrease ROM of the right Hip joint. Dry dressing noted   NEUROLOGIC/MUSCULOSKELETAL: AOx3, BUT unable to recall past events         -----------------------------------------------------------------------------------------------------------------------------------------------------------------------------------------------

## 2022-03-19 NOTE — PHYSICAL THERAPY INITIAL EVALUATION ADULT - PERTINENT HX OF CURRENT PROBLEM, REHAB EVAL
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 +Acute Intratrochanteric R Femur Fx

## 2022-03-19 NOTE — DISCHARGE NOTE PROVIDER - NSDCMRMEDTOKEN_GEN_ALL_CORE_FT
aspirin 81 mg oral tablet: 1 tab(s) orally once a day  Crestor 10 mg oral tablet: 1 tab(s) orally once a day  dronedarone 400 mg oral tablet: 1 tab(s) orally 2 times a day  Eliquis 2.5 mg oral tablet: 1 tab(s) orally 2 times a day  levothyroxine 50 mcg (0.05 mg) oral tablet: 1 tab(s) orally once a day  LORazepam 0.5 mg oral tablet: 1 tab(s) orally 3 times a day, As Needed  Metoprolol Succinate ER 50 mg oral tablet, extended release: 1 tab(s) orally once a day  mirtazapine 30 mg oral tablet: 1 tab(s) orally once a day (at bedtime)   acetaminophen 325 mg oral tablet: 3 tab(s) orally every 6 hours, As needed, Mild Pain (1 - 3)  aspirin 81 mg oral tablet: 1 tab(s) orally once a day  Crestor 10 mg oral tablet: 1 tab(s) orally once a day  dronedarone 400 mg oral tablet: 1 tab(s) orally 2 times a day  Eliquis 2.5 mg oral tablet: 1 tab(s) orally 2 times a day  levothyroxine 50 mcg (0.05 mg) oral tablet: 1 tab(s) orally once a day  LORazepam 0.5 mg oral tablet: 1 tab(s) orally 3 times a day, As Needed  Metoprolol Succinate ER 50 mg oral tablet, extended release: 1 tab(s) orally once a day  mirtazapine 30 mg oral tablet: 1 tab(s) orally once a day (at bedtime)

## 2022-03-20 LAB
ALBUMIN SERPL ELPH-MCNC: 3.2 G/DL — LOW (ref 3.5–5.2)
ALP SERPL-CCNC: 56 U/L — SIGNIFICANT CHANGE UP (ref 30–115)
ALT FLD-CCNC: 6 U/L — SIGNIFICANT CHANGE UP (ref 0–41)
ANION GAP SERPL CALC-SCNC: 8 MMOL/L — SIGNIFICANT CHANGE UP (ref 7–14)
AST SERPL-CCNC: 13 U/L — SIGNIFICANT CHANGE UP (ref 0–41)
BILIRUB SERPL-MCNC: 0.4 MG/DL — SIGNIFICANT CHANGE UP (ref 0.2–1.2)
BLD GP AB SCN SERPL QL: SIGNIFICANT CHANGE UP
BUN SERPL-MCNC: 32 MG/DL — HIGH (ref 10–20)
CALCIUM SERPL-MCNC: 8 MG/DL — LOW (ref 8.5–10.1)
CHLORIDE SERPL-SCNC: 99 MMOL/L — SIGNIFICANT CHANGE UP (ref 98–110)
CO2 SERPL-SCNC: 30 MMOL/L — SIGNIFICANT CHANGE UP (ref 17–32)
CREAT SERPL-MCNC: 0.7 MG/DL — SIGNIFICANT CHANGE UP (ref 0.7–1.5)
EGFR: 87 ML/MIN/1.73M2 — SIGNIFICANT CHANGE UP
GLUCOSE SERPL-MCNC: 102 MG/DL — HIGH (ref 70–99)
HCT VFR BLD CALC: 22.8 % — LOW (ref 37–47)
HGB BLD-MCNC: 7.3 G/DL — LOW (ref 12–16)
MAGNESIUM SERPL-MCNC: 2 MG/DL — SIGNIFICANT CHANGE UP (ref 1.8–2.4)
MCHC RBC-ENTMCNC: 28.7 PG — SIGNIFICANT CHANGE UP (ref 27–31)
MCHC RBC-ENTMCNC: 32 G/DL — SIGNIFICANT CHANGE UP (ref 32–37)
MCV RBC AUTO: 89.8 FL — SIGNIFICANT CHANGE UP (ref 81–99)
NRBC # BLD: 0 /100 WBCS — SIGNIFICANT CHANGE UP (ref 0–0)
PLATELET # BLD AUTO: 195 K/UL — SIGNIFICANT CHANGE UP (ref 130–400)
POTASSIUM SERPL-MCNC: 4.8 MMOL/L — SIGNIFICANT CHANGE UP (ref 3.5–5)
POTASSIUM SERPL-SCNC: 4.8 MMOL/L — SIGNIFICANT CHANGE UP (ref 3.5–5)
PROT SERPL-MCNC: 5.8 G/DL — LOW (ref 6–8)
RBC # BLD: 2.54 M/UL — LOW (ref 4.2–5.4)
RBC # FLD: 14.1 % — SIGNIFICANT CHANGE UP (ref 11.5–14.5)
SODIUM SERPL-SCNC: 137 MMOL/L — SIGNIFICANT CHANGE UP (ref 135–146)
WBC # BLD: 10.31 K/UL — SIGNIFICANT CHANGE UP (ref 4.8–10.8)
WBC # FLD AUTO: 10.31 K/UL — SIGNIFICANT CHANGE UP (ref 4.8–10.8)

## 2022-03-20 PROCEDURE — 99233 SBSQ HOSP IP/OBS HIGH 50: CPT

## 2022-03-20 RX ORDER — POLYETHYLENE GLYCOL 3350 17 G/17G
17 POWDER, FOR SOLUTION ORAL DAILY
Refills: 0 | Status: DISCONTINUED | OUTPATIENT
Start: 2022-03-20 | End: 2022-03-21

## 2022-03-20 RX ORDER — APIXABAN 2.5 MG/1
2.5 TABLET, FILM COATED ORAL EVERY 12 HOURS
Refills: 0 | Status: DISCONTINUED | OUTPATIENT
Start: 2022-03-20 | End: 2022-03-21

## 2022-03-20 RX ADMIN — HEPARIN SODIUM 5000 UNIT(S): 5000 INJECTION INTRAVENOUS; SUBCUTANEOUS at 05:10

## 2022-03-20 RX ADMIN — Medication 81 MILLIGRAM(S): at 11:43

## 2022-03-20 RX ADMIN — ATORVASTATIN CALCIUM 40 MILLIGRAM(S): 80 TABLET, FILM COATED ORAL at 22:36

## 2022-03-20 RX ADMIN — Medication 0.5 MILLIGRAM(S): at 23:08

## 2022-03-20 RX ADMIN — Medication 50 MILLIGRAM(S): at 05:10

## 2022-03-20 RX ADMIN — DRONEDARONE 400 MILLIGRAM(S): 400 TABLET, FILM COATED ORAL at 17:01

## 2022-03-20 RX ADMIN — SENNA PLUS 2 TABLET(S): 8.6 TABLET ORAL at 22:36

## 2022-03-20 RX ADMIN — MIRTAZAPINE 30 MILLIGRAM(S): 45 TABLET, ORALLY DISINTEGRATING ORAL at 22:36

## 2022-03-20 RX ADMIN — APIXABAN 2.5 MILLIGRAM(S): 2.5 TABLET, FILM COATED ORAL at 17:01

## 2022-03-20 RX ADMIN — DRONEDARONE 400 MILLIGRAM(S): 400 TABLET, FILM COATED ORAL at 05:10

## 2022-03-20 RX ADMIN — POLYETHYLENE GLYCOL 3350 17 GRAM(S): 17 POWDER, FOR SOLUTION ORAL at 11:38

## 2022-03-20 RX ADMIN — Medication 50 MICROGRAM(S): at 05:10

## 2022-03-20 NOTE — PROGRESS NOTE ADULT - TIME BILLING
Direct patient care. Discussed on rounds with Housestaff
Direct patient care. Discussed on rounds with Housestaff
Direct patient . Discussed with Housestaff

## 2022-03-20 NOTE — PROGRESS NOTE ADULT - SUBJECTIVE AND OBJECTIVE BOX
ORTHOPAEDIC SURGERY PROGRESS NOTE    Interval History:  Patient seen and examined at bedside.  Pain is controlled.  No complaints of chest pain, SOB, N/V.    Physical Exam:   Dressings C/D/I, changed today, incisions c/d/i  Compartments soft and compressible  Motor intact distally  SILT distally  CR<2sec, palpable pulses                           7.3    10.31 )-----------( 195      ( 20 Mar 2022 04:30 )             22.8     137  |  99  |  32<H>  ----------------------------<  102<H>  4.8   |  30  |  0.7    Ca    8.0<L>      20 Mar 2022 04:30  Mg     2.0     03-20  TPro  5.8<L>  /  Alb  3.2<L>  /  TBili  0.4  /  DBili  x   /  AST  13  /  ALT  6   /  AlkPhos  56  03-20    A/P: 81F in stable condition after R hip IMN on 3/18/22.    Dressings changed today POD2, incisions c/d/i  WBAT RLE  AM labs, trend H/H, transfuse if Hb<7 or Hb<8 AND symptomatic or hemodynamically unstable  DVT ppx: okay to resume chemical DVT PPX; mech ppx with scds  Postop abx for 24 hrs  Incentive spirometry  Diet  Pain control  Home medications  PT/OT/rehab  OOBTC BID with assistance  Monitor dressing ORTHOPAEDIC SURGERY PROGRESS NOTE    Interval History:  Patient seen and examined at bedside.  Pain is controlled.  No complaints of chest pain, SOB, N/V.    Physical Exam:   Dressings C/D/I, changed today, incisions c/d/i  Compartments soft and compressible  Motor intact distally  SILT distally  CR<2sec, palpable pulses                           7.3    10.31 )-----------( 195      ( 20 Mar 2022 04:30 )             22.8     137  |  99  |  32<H>  ----------------------------<  102<H>  4.8   |  30  |  0.7    Ca    8.0<L>      20 Mar 2022 04:30  Mg     2.0     03-20  TPro  5.8<L>  /  Alb  3.2<L>  /  TBili  0.4  /  DBili  x   /  AST  13  /  ALT  6   /  AlkPhos  56  03-20    A/P: 81F in stable condition after R hip IMN on 3/18/22.    Dressings changed today POD2, incisions c/d/i  WBAT RLE  AM labs, trend H/H, transfuse if Hb<7 or Hb<8 AND symptomatic or hemodynamically unstable  DVT ppx: okay to [facility]  Postop abx for 24 hrs, Ancef completed, reviewed  Incentive spirometry  Diet  Pain control  Home medications  PT/OT/rehab  OOBTC BID with assistance  Monitor dressing

## 2022-03-20 NOTE — PROGRESS NOTE ADULT - SUBJECTIVE AND OBJECTIVE BOX
Patient is a 81y old  Female who presents with a chief complaint of Fall (18 Mar 2022 07:06)    Patient was seen and examined.  no new events    PAST MEDICAL & SURGICAL HISTORY:  Hypertension  Hyperlipidemia  Hypothyroidism    No significant past surgical history    Allergies  No Known Allergies    Home Medications:  aspirin 81 mg oral tablet: 1 tab(s) orally once a day (17 Mar 2022 04:31)  Crestor 10 mg oral tablet: 1 tab(s) orally once a day (17 Mar 2022 04:31)  dronedarone 400 mg oral tablet: 1 tab(s) orally 2 times a day (17 Mar 2022 04:31)  Eliquis 2.5 mg oral tablet: 1 tab(s) orally 2 times a day (17 Mar 2022 04:31)  levothyroxine 50 mcg (0.05 mg) oral tablet: 1 tab(s) orally once a day (17 Mar 2022 04:31)  LORazepam 0.5 mg oral tablet: 1 tab(s) orally 3 times a day, As Needed (17 Mar 2022 04:31)  Metoprolol Succinate ER 50 mg oral tablet, extended release: 1 tab(s) orally once a day (17 Mar 2022 04:31)  mirtazapine 30 mg oral tablet: 1 tab(s) orally once a day (at bedtime) (17 Mar 2022 04:31)      MEDICATIONS  (STANDING):  aspirin  chewable 81 milliGRAM(s) Oral daily  atorvastatin 40 milliGRAM(s) Oral at bedtime  dronedarone 400 milliGRAM(s) Oral two times a day  heparin   Injectable 5000 Unit(s) SubCutaneous every 8 hours  levothyroxine 50 MICROGram(s) Oral daily  metoprolol succinate ER 50 milliGRAM(s) Oral daily  mirtazapine 30 milliGRAM(s) Oral at bedtime  polyethylene glycol 3350 17 Gram(s) Oral daily  senna 2 Tablet(s) Oral at bedtime    MEDICATIONS  (PRN):  acetaminophen     Tablet .. 975 milliGRAM(s) Oral every 6 hours PRN Mild Pain (1 - 3)  HYDROmorphone  Injectable 1 milliGRAM(s) IV Push every 4 hours PRN Severe Pain (7 - 10)  LORazepam     Tablet 0.5 milliGRAM(s) Oral three times a day PRN Anxiety    T(C): 35.9 (03-20-22 @ 07:56), Max: 36.8 (03-20-22 @ 00:00)  HR: 84 (03-20-22 @ 07:56) (67 - 84)  BP: 124/61 (03-20-22 @ 07:56) (106/51 - 124/61)  RR: 18 (03-20-22 @ 07:56) (18 - 18)  SpO2: --    O/E:  Awake, alert, not in distress.  HEENT: atraumatic, EOMI.  Chest: clear.  CVS: SIS2 +, no murmur.  P/A: Soft, BS+  CNS: awake alert, not oriented  Ext: no edema feet. right hip dressing+  Skin: no rash, no ulcers.  All systems reviewed positive findings as above.                                                  7.3<L>  10.31 )-----------( 195      ( 20 Mar 2022 04:30 )             22.8<L>                        7.9<L>  14.28<H> )-----------( 250      ( 19 Mar 2022 16:00 )             26.0<L>  03-20    137  |  99  |  32<H>  ----------------------------<  102<H>  4.8   |  30  |  0.7  03-19    138  |  99  |  29<H>  ----------------------------<  120<H>  4.9   |  29  |  0.6<L>    Ca    8.0<L>      20 Mar 2022 04:30  Ca    8.2<L>      19 Mar 2022 04:30  Mg     2.0     03-20    TPro  5.8<L>  /  Alb  3.2<L>  /  TBili  0.4  /  DBili  x   /  AST  13  /  ALT  6   /  AlkPhos  56  03-20  TPro  5.7<L>  /  Alb  3.2<L>  /  TBili  0.3  /  DBili  x   /  AST  13  /  ALT  10  /  AlkPhos  57  03-19

## 2022-03-20 NOTE — PROGRESS NOTE ADULT - ASSESSMENT
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 She complains of right hip pain, constant, sharp, non-radiating, no alleviating factors, aggravated by movement.    # Right hip fracture sec to mechanical fall  - CT Abdomen and Pelvis w/ IV Cont (03.17.22 @ 00:49) >Right intertrochanteric fracture. Trace left pleural effusion.   - S/p R hip IMN on 3/18/22  - WBAT RLE  - C/w pain meds  - evaluated by PT--> STR    # Normocytic anemia sec to acute blood loss  - c/w  ferrous sulphate  - monitor HB/HCT    #Aortic Dissection s/p Repair   - CT Abdomen and Pelvis w/ IV Cont (03.17.22 @ 00:49) >Patient is status post descending thoracic aorta repair, with interval   resolution of previously noted large penetrating ulcer. 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   There appears to be a channel arising from the covered right renal artery stent and extending into the lumbar arteries (5/298). Although it is not clear whether the contrast is filling this channel from the lumbar arteries or from the right renal artery.  - vascular surgery : she was post open arch and ascending aortic aneurysm repair, and had a large DTA dissection and aneurysm. Since then she had a TEVAR and right renal stenting for addressing the issue. She is actively seen and followed in OSH. The DTA aneurysm has shrank in size since last imaging and the dissection in the infrarenal part, is not flow limiting to any of the iliac or visceral vessels (is indeed below renals). Infrarenal aorta is <4 cm.  - She can undergo her hip procedure, and FU with her vascular surgeon.  - c/w ASA, statin    # Paroxysmal Afib--> SR  # H/o AVR  - c/w multaq, metoprolol  - restart eliquis    # urinary retention  -  bladder scan --> if continues to retain--> foleys cath    # H/o Hypothyroidism  - c/w synthroid    # H/o Anxiety  - c/w home meds    # Full code    # Pending:  bladder scan, monitor cbc  # Disposition: STR when stable

## 2022-03-21 ENCOUNTER — TRANSCRIPTION ENCOUNTER (OUTPATIENT)
Age: 82
End: 2022-03-21

## 2022-03-21 VITALS — HEIGHT: 65.98 IN | WEIGHT: 115.3 LBS

## 2022-03-21 LAB
ALBUMIN SERPL ELPH-MCNC: 3.6 G/DL — SIGNIFICANT CHANGE UP (ref 3.5–5.2)
ALP SERPL-CCNC: 64 U/L — SIGNIFICANT CHANGE UP (ref 30–115)
ALT FLD-CCNC: 8 U/L — SIGNIFICANT CHANGE UP (ref 0–41)
ANION GAP SERPL CALC-SCNC: 10 MMOL/L — SIGNIFICANT CHANGE UP (ref 7–14)
AST SERPL-CCNC: 20 U/L — SIGNIFICANT CHANGE UP (ref 0–41)
BILIRUB SERPL-MCNC: 0.8 MG/DL — SIGNIFICANT CHANGE UP (ref 0.2–1.2)
BUN SERPL-MCNC: 17 MG/DL — SIGNIFICANT CHANGE UP (ref 10–20)
CALCIUM SERPL-MCNC: 8.4 MG/DL — LOW (ref 8.5–10.1)
CHLORIDE SERPL-SCNC: 96 MMOL/L — LOW (ref 98–110)
CO2 SERPL-SCNC: 29 MMOL/L — SIGNIFICANT CHANGE UP (ref 17–32)
CREAT SERPL-MCNC: 0.6 MG/DL — LOW (ref 0.7–1.5)
EGFR: 91 ML/MIN/1.73M2 — SIGNIFICANT CHANGE UP
GLUCOSE SERPL-MCNC: 98 MG/DL — SIGNIFICANT CHANGE UP (ref 70–99)
HCT VFR BLD CALC: 25.3 % — LOW (ref 37–47)
HGB BLD-MCNC: 8.2 G/DL — LOW (ref 12–16)
MAGNESIUM SERPL-MCNC: 2 MG/DL — SIGNIFICANT CHANGE UP (ref 1.8–2.4)
MCHC RBC-ENTMCNC: 29.2 PG — SIGNIFICANT CHANGE UP (ref 27–31)
MCHC RBC-ENTMCNC: 32.4 G/DL — SIGNIFICANT CHANGE UP (ref 32–37)
MCV RBC AUTO: 90 FL — SIGNIFICANT CHANGE UP (ref 81–99)
NRBC # BLD: 0 /100 WBCS — SIGNIFICANT CHANGE UP (ref 0–0)
PLATELET # BLD AUTO: 249 K/UL — SIGNIFICANT CHANGE UP (ref 130–400)
POTASSIUM SERPL-MCNC: 4.1 MMOL/L — SIGNIFICANT CHANGE UP (ref 3.5–5)
POTASSIUM SERPL-SCNC: 4.1 MMOL/L — SIGNIFICANT CHANGE UP (ref 3.5–5)
PROT SERPL-MCNC: 6.2 G/DL — SIGNIFICANT CHANGE UP (ref 6–8)
RBC # BLD: 2.81 M/UL — LOW (ref 4.2–5.4)
RBC # FLD: 14.4 % — SIGNIFICANT CHANGE UP (ref 11.5–14.5)
SODIUM SERPL-SCNC: 135 MMOL/L — SIGNIFICANT CHANGE UP (ref 135–146)
WBC # BLD: 11.58 K/UL — HIGH (ref 4.8–10.8)
WBC # FLD AUTO: 11.58 K/UL — HIGH (ref 4.8–10.8)

## 2022-03-21 PROCEDURE — 99239 HOSP IP/OBS DSCHRG MGMT >30: CPT

## 2022-03-21 RX ORDER — ACETAMINOPHEN 500 MG
3 TABLET ORAL
Qty: 0 | Refills: 0 | DISCHARGE
Start: 2022-03-21

## 2022-03-21 RX ADMIN — Medication 975 MILLIGRAM(S): at 03:10

## 2022-03-21 RX ADMIN — HYDROMORPHONE HYDROCHLORIDE 1 MILLIGRAM(S): 2 INJECTION INTRAMUSCULAR; INTRAVENOUS; SUBCUTANEOUS at 02:40

## 2022-03-21 RX ADMIN — DRONEDARONE 400 MILLIGRAM(S): 400 TABLET, FILM COATED ORAL at 06:23

## 2022-03-21 RX ADMIN — Medication 50 MICROGRAM(S): at 06:23

## 2022-03-21 RX ADMIN — Medication 50 MILLIGRAM(S): at 06:23

## 2022-03-21 RX ADMIN — Medication 81 MILLIGRAM(S): at 13:28

## 2022-03-21 RX ADMIN — APIXABAN 2.5 MILLIGRAM(S): 2.5 TABLET, FILM COATED ORAL at 06:23

## 2022-03-21 NOTE — DIETITIAN INITIAL EVALUATION ADULT. - OTHER CALCULATIONS
using ABW 52.3kg; Energy: 1218-1320kcal/day (MSJ 1.2-1.3AF -- BMI <19 noted); protein: 62-68g/day (1.2-1.3g/kg -- same reason as above); Fluid: 1300mL/day

## 2022-03-21 NOTE — DISCHARGE NOTE NURSING/CASE MANAGEMENT/SOCIAL WORK - PATIENT PORTAL LINK FT
You can access the FollowMyHealth Patient Portal offered by [facility] by registering at the following website: [email or web address]. By joining [facility]’s FollowMyHealth portal, you will also be able to view your health information using other applications (apps) compatible with our system.

## 2022-03-21 NOTE — PROGRESS NOTE ADULT - PROVIDER SPECIALTY LIST ADULT
Hospitalist
Internal Medicine
Internal Medicine
Orthopedics
Orthopedics
Internal Medicine
Orthopedics
Orthopedics
Hospitalist
Hospitalist

## 2022-03-21 NOTE — DIETITIAN INITIAL EVALUATION ADULT. - PHYSCIAL ASSESSMENT
alert/confused; no edema noted; GI: JJ CASTAÑEDA -- RN unsure -- will monitor; skin: surgical incision; no other wts in EMR

## 2022-03-21 NOTE — DIETITIAN INITIAL EVALUATION ADULT. - ORAL INTAKE PTA/DIET HISTORY
Pt confused upon RD visit and also confirmed by RN, nutrition hx not reliable provided by pt. Attempted to call all emergency contacts (#[phone]) -- unable to reach, will attempt at f/u.

## 2022-03-21 NOTE — PROGRESS NOTE ADULT - ASSESSMENT
s/p right hip orif pod 3     pain control   dvt proph   medical management   pt ot   wbat   dispo planning   if discharged please include in instructions fu dr donna smith 2 weeks post op call [phone] for appointment   staple removal 2 weeks post op keep incision clean and dry.  if increased pain fever swelling or discharge call md

## 2022-03-21 NOTE — DIETITIAN INITIAL EVALUATION ADULT. - ADD RECOMMEND
1. Add Ensure Enlive BID 1. Add Ensure Enlive BID (700kcal/40g PRO). 2. Cont w/ current diet order. 3. encourage/assist w/ meals PRN. 4. monitor PO intake and BM closely -- bowel regimen noted active.

## 2022-03-21 NOTE — PROGRESS NOTE ADULT - SUBJECTIVE AND OBJECTIVE BOX
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 She complains of right hip pain, constant, sharp, non-radiating, no alleviating factors, aggravated by movement.    ED COURSE: Fd to have an acute R Hip Intratrochanteric fracture. Additionally supplemental read of CT Chest/A/P found what appears to be an acute aortic dissection but was in fact on further review of prior scans noted to be a chronic finding.       Today is hospital day 4d.     INTERVAL HOSPITAL COURSE / OVERNIGHT EVENTS:    Patient was examined and seen at bedside. This morning she is resting comfortably in bed and reports no new issues or overnight events.     Review of Systems: Patient is endorsing right hip pain, unable to move, decrease ROM. Patient denies fever, chills, headache, dizziness. Patient denies chest pain, palpitation, SOB.       <<<<<PAST MEDICAL & SURGICAL HISTORY>>>>>  Hypertension    Hyperlipidemia    Hypothyroidism    No significant past surgical history      ALLERGIES  No Known Allergies      Home Medications:  aspirin 81 mg oral tablet: 1 tab(s) orally once a day (17 Mar 2022 04:31)  Crestor 10 mg oral tablet: 1 tab(s) orally once a day (17 Mar 2022 04:31)  dronedarone 400 mg oral tablet: 1 tab(s) orally 2 times a day (17 Mar 2022 04:31)  Eliquis 2.5 mg oral tablet: 1 tab(s) orally 2 times a day (17 Mar 2022 04:31)  levothyroxine 50 mcg (0.05 mg) oral tablet: 1 tab(s) orally once a day (17 Mar 2022 04:31)  LORazepam 0.5 mg oral tablet: 1 tab(s) orally 3 times a day, As Needed (17 Mar 2022 04:31)  Metoprolol Succinate ER 50 mg oral tablet, extended release: 1 tab(s) orally once a day (17 Mar 2022 04:31)  mirtazapine 30 mg oral tablet: 1 tab(s) orally once a day (at bedtime) (17 Mar 2022 04:31)        MEDICATIONS  STANDING MEDICATIONS  apixaban 2.5 milliGRAM(s) Oral every 12 hours  aspirin  chewable 81 milliGRAM(s) Oral daily  atorvastatin 40 milliGRAM(s) Oral at bedtime  dronedarone 400 milliGRAM(s) Oral two times a day  levothyroxine 50 MICROGram(s) Oral daily  metoprolol succinate ER 50 milliGRAM(s) Oral daily  mirtazapine 30 milliGRAM(s) Oral at bedtime  polyethylene glycol 3350 17 Gram(s) Oral daily  senna 2 Tablet(s) Oral at bedtime    PRN MEDICATIONS  acetaminophen     Tablet .. 975 milliGRAM(s) Oral every 6 hours PRN  HYDROmorphone  Injectable 1 milliGRAM(s) IV Push every 4 hours PRN  LORazepam     Tablet 0.5 milliGRAM(s) Oral three times a day PRN    VITALS:  T(F): 96.5  HR: 70  BP: 144/64  RR: 18  SpO2: --    <<<<<LABS>>>>>                        8.2    11.58 )-----------( 249      ( 21 Mar 2022 06:30 )             25.3     03-21    135  |  96<L>  |  17  ----------------------------<  98  4.1   |  29  |  0.6<L>    Ca    8.4<L>      21 Mar 2022 06:30  Mg     2.0     03-21    TPro  6.2  /  Alb  3.6  /  TBili  0.8  /  DBili  x   /  AST  20  /  ALT  8   /  AlkPhos  64  03-21            [id number]        <<<<<RADIOLOGY>>>>>    <<<<<PHYSICAL EXAM>>>>>  GENERAL: Well developed, well nourished and in no acute distress. Resting comfortably in bed.  HEENT: Normocephalic, atraumatic, mucous membranes moist, EOMI, PERRLA, bilateral sclera anicteric, no conjunctival injection  Neck: Supple, non-tender, no lymphadenopathy.  PULMONARY: Clear to auscultation bilaterally. No rales, rhonchi, or wheezing.  CARDIOVASCULAR: Regular rate and rhythm, S1-S2, no murmurs  GASTROINTESTINAL: Soft, non-tender, non-distended, no guarding.  RENAL: No CVA tenderness.  SKIN/EXTREMITIES: No clubbing or edema  NEUROLOGIC/MUSCULOSKELETAL: AOx4, grossly moving all extremities, no focal deficits.      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 She complains of right hip pain, constant, sharp, non-radiating, no alleviating factors, aggravated by movement.    ED COURSE: Fd to have an acute R Hip Intratrochanteric fracture. Additionally supplemental read of CT Chest/A/P found what appears to be an acute aortic dissection but was in fact on further review of prior scans noted to be a chronic finding.       Today is hospital day 4d.     INTERVAL HOSPITAL COURSE / OVERNIGHT EVENTS:    Patient was examined and seen at bedside. This morning she is resting comfortably in bed and reports no new issues or overnight events.     Review of Systems: Patient is endorsing improvement of right hip pain. Patient denies fever, chills, headache, dizziness. Patient denies chest pain, palpitation, SOB.       <<<<<PAST MEDICAL & SURGICAL HISTORY>>>>>  Hypertension    Hyperlipidemia    Hypothyroidism    No significant past surgical history      ALLERGIES  No Known Allergies      Home Medications:  aspirin 81 mg oral tablet: 1 tab(s) orally once a day (17 Mar 2022 04:31)  Crestor 10 mg oral tablet: 1 tab(s) orally once a day (17 Mar 2022 04:31)  dronedarone 400 mg oral tablet: 1 tab(s) orally 2 times a day (17 Mar 2022 04:31)  Eliquis 2.5 mg oral tablet: 1 tab(s) orally 2 times a day (17 Mar 2022 04:31)  levothyroxine 50 mcg (0.05 mg) oral tablet: 1 tab(s) orally once a day (17 Mar 2022 04:31)  LORazepam 0.5 mg oral tablet: 1 tab(s) orally 3 times a day, As Needed (17 Mar 2022 04:31)  Metoprolol Succinate ER 50 mg oral tablet, extended release: 1 tab(s) orally once a day (17 Mar 2022 04:31)  mirtazapine 30 mg oral tablet: 1 tab(s) orally once a day (at bedtime) (17 Mar 2022 04:31)        MEDICATIONS  STANDING MEDICATIONS  apixaban 2.5 milliGRAM(s) Oral every 12 hours  aspirin  chewable 81 milliGRAM(s) Oral daily  atorvastatin 40 milliGRAM(s) Oral at bedtime  dronedarone 400 milliGRAM(s) Oral two times a day  levothyroxine 50 MICROGram(s) Oral daily  metoprolol succinate ER 50 milliGRAM(s) Oral daily  mirtazapine 30 milliGRAM(s) Oral at bedtime  polyethylene glycol 3350 17 Gram(s) Oral daily  senna 2 Tablet(s) Oral at bedtime    PRN MEDICATIONS  acetaminophen     Tablet .. 975 milliGRAM(s) Oral every 6 hours PRN  HYDROmorphone  Injectable 1 milliGRAM(s) IV Push every 4 hours PRN  LORazepam     Tablet 0.5 milliGRAM(s) Oral three times a day PRN    VITALS:  T(F): 96.5  HR: 70  BP: 144/64  RR: 18  SpO2: --    <<<<<LABS>>>>>                        8.2    11.58 )-----------( 249      ( 21 Mar 2022 06:30 )             25.3     03-21    135  |  96<L>  |  17  ----------------------------<  98  4.1   |  29  |  0.6<L>    Ca    8.4<L>      21 Mar 2022 06:30  Mg     2.0     03-21    TPro  6.2  /  Alb  3.6  /  TBili  0.8  /  DBili  x   /  AST  20  /  ALT  8   /  AlkPhos  64  03-21            [id number]        <<<<<RADIOLOGY>>>>>    <<<<<PHYSICAL EXAM>>>>>  GENERAL: Well developed, well nourished and in no acute distress. Resting comfortably in bed.  HEENT: Normocephalic, atraumatic, mucous membranes moist, EOMI, PERRLA, bilateral sclera anicteric, no conjunctival injection  Neck: Supple, non-tender, no lymphadenopathy.  PULMONARY: Clear to auscultation bilaterally. No rales, rhonchi, or wheezing.  CARDIOVASCULAR: Regular rate and rhythm, S1-S2, no murmurs  GASTROINTESTINAL: Soft, non-tender, non-distended, no guarding.  RENAL: No CVA tenderness.  SKIN/EXTREMITIES: No clubbing or edema  NEUROLOGIC/MUSCULOSKELETAL: AOx4, grossly moving all extremities, no focal deficits.      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 She complains of right hip pain, constant, sharp, non-radiating, no alleviating factors, aggravated by movement.    ED COURSE: Fd to have an acute R Hip Intratrochanteric fracture. Additionally supplemental read of CT Chest/A/P found what appears to be an acute aortic dissection but was in fact on further review of prior scans noted to be a chronic finding.       Today is hospital day 4d.     INTERVAL HOSPITAL COURSE / OVERNIGHT EVENTS:    Patient was examined and seen at bedside. This morning she is resting comfortably in bed and reports no new issues or overnight events.     Review of Systems: Patient is endorsing improvement of right hip pain. Patient denies fever, chills, headache, dizziness. Patient denies chest pain, palpitation, SOB.       <<<<<PAST MEDICAL & SURGICAL HISTORY>>>>>  Hypertension    Hyperlipidemia    Hypothyroidism    No significant past surgical history      ALLERGIES  No Known Allergies      Home Medications:  aspirin 81 mg oral tablet: 1 tab(s) orally once a day (17 Mar 2022 04:31)  Crestor 10 mg oral tablet: 1 tab(s) orally once a day (17 Mar 2022 04:31)  dronedarone 400 mg oral tablet: 1 tab(s) orally 2 times a day (17 Mar 2022 04:31)  Eliquis 2.5 mg oral tablet: 1 tab(s) orally 2 times a day (17 Mar 2022 04:31)  levothyroxine 50 mcg (0.05 mg) oral tablet: 1 tab(s) orally once a day (17 Mar 2022 04:31)  LORazepam 0.5 mg oral tablet: 1 tab(s) orally 3 times a day, As Needed (17 Mar 2022 04:31)  Metoprolol Succinate ER 50 mg oral tablet, extended release: 1 tab(s) orally once a day (17 Mar 2022 04:31)  mirtazapine 30 mg oral tablet: 1 tab(s) orally once a day (at bedtime) (17 Mar 2022 04:31)        MEDICATIONS  STANDING MEDICATIONS  apixaban 2.5 milliGRAM(s) Oral every 12 hours  aspirin  chewable 81 milliGRAM(s) Oral daily  atorvastatin 40 milliGRAM(s) Oral at bedtime  dronedarone 400 milliGRAM(s) Oral two times a day  levothyroxine 50 MICROGram(s) Oral daily  metoprolol succinate ER 50 milliGRAM(s) Oral daily  mirtazapine 30 milliGRAM(s) Oral at bedtime  polyethylene glycol 3350 17 Gram(s) Oral daily  senna 2 Tablet(s) Oral at bedtime    PRN MEDICATIONS  acetaminophen     Tablet .. 975 milliGRAM(s) Oral every 6 hours PRN  HYDROmorphone  Injectable 1 milliGRAM(s) IV Push every 4 hours PRN  LORazepam     Tablet 0.5 milliGRAM(s) Oral three times a day PRN    VITALS:  T(F): 96.5  HR: 70  BP: 144/64  RR: 18  SpO2: --    <<<<<LABS>>>>>                        8.2    11.58 )-----------( 249      ( 21 Mar 2022 06:30 )             25.3     03-21    135  |  96<L>  |  17  ----------------------------<  98  4.1   |  29  |  0.6<L>    Ca    8.4<L>      21 Mar 2022 06:30  Mg     2.0     03-21    TPro  6.2  /  Alb  3.6  /  TBili  0.8  /  DBili  x   /  AST  20  /  ALT  8   /  AlkPhos  64  03-21            [id number]        <<<<<RADIOLOGY>>>>>  < from: Xray Femur 2 Views, Right (03.17.22 @ 01:16) >  IMPRESSION:    Right intertrochanteric fracture with mild varus angulation. Displaced   lesser trochanter fracture. Prior total knee replacement with intact   hardware.    Degenerative are changes are present.    --- Endof Report ---    < end of copied text >    < from: Xray Hip 2-3 Views, Right (03.17.22 @ 01:16) >  Findings/  impression:    There is a right intertrochanteric fracture with mild varus angulation.   Lesser trochanter is displaced medially by approximately 1 cm. No other   fractures are identified. There are severe right and moderate to severe   left hip degenerative changes.        --- End of Report ---    < end of copied text >    < from: Xray Knee 1 or 2 Views, Right (03.17.22 @ 01:17) >  IMPRESSION:    There is no acute fracture.  There is no dislocation. Prior total knee   replacement, with intact hardware. Diffuse osteopenia.    --- End of Report ---    < end of copied text >        <<<<<PHYSICAL EXAM>>>>>  GENERAL: Well developed, well nourished and in no acute distress. Resting comfortably in bed.  PULMONARY: Clear to auscultation bilaterally. No rales, rhonchi, or wheezing.  CARDIOVASCULAR: Regular rate and rhythm, S1-S2, no murmurs  GASTROINTESTINAL: Soft, non-tender, non-distended, no guarding.  SKIN/EXTREMITIES: decrease ROM of the right Hip joint. Dry dressing noted   NEUROLOGIC/MUSCULOSKELETAL: AOx3, BUT unable to recall past events       -----------------------------------------------------------------------------------------------------------------------------------------------------------------------------------------------

## 2022-03-21 NOTE — DIETITIAN INITIAL EVALUATION ADULT. - MALNUTRITION
unable to reach family at this time for hx, will attempt at f/u as low wt + muscle/fat wasting noted

## 2022-03-21 NOTE — PROGRESS NOTE ADULT - ASSESSMENT
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    #Mechanical Fall  #Right hip fracture  - Xray significant for right intertrochanteric fracture with mild varus angulation. Lesser trochanter is displaced medially by approximately 1 cm.   - Cardiac risk stratification done by cardio   - High-risk patient for a Moderate-risk surgery/procedure  - ECHO onde on 3/17 noted above, shows EF of 50%  - Ortho is following  - s/p R hip IMN on 3/18/22  - Holding anticoagulation for surgery, can be restarted postop day 2  - At baseline patient can climb 2 flight of stairs but has limited activity due to dementia.  - PT/OT/Rehab   - WBAT RLE  - OOBTC BID with assistance    - Patient's Hb dropped postop day 1, monitoring H+H, Will transfuse if <7    #Aortic Dissection s/p Repair   - No intervention warranted per vascular  - Patient was seen by vascular in July 2021  - S/p open arch and ascending aortic aneurysm repair, and had a large DTA dissection and aneurysm.   - S/p TEVAR and right renal stenting for addressing the issue.  - Monitor and outpatient vascular follow up     #H/o AVR  #Chronic A Fib on Eliquis at home  - Hold Eliquis, c/w Multac, c/w Metoprolol  - TTE on 3/17 shows EF of 50%  - Can restart Eliquis postop day 2    #H/o Hypothyroidism  - Check TSH  - c/w Synthroid QD    #H/o Anxiety  - c/w Mirtazapine  - c/w Alprazolam TID PRN    #Misc   - DVT prophylaxis: Heparin sq  - GI prophylaxis: no indicated   - Diet: DASH/TLC  - Activity status: WBAT to the R side, OOBTC BID   - Disposition: Acute   Pending: PT/OT/Rehab, CBC 16:00, Eliquis to be restarted on 3/20       81 year old female, PMHx of Anxiety, Paroxysmal AFib on eliquis, 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    #Mechanical Fall  #Right hip fracture  - Xray significant for right intertrochanteric fracture with mild varus angulation. Lesser trochanter is displaced medially by approximately 1 cm.   - Cardiac risk stratification done by cardio   - High-risk patient for a Moderate-risk surgery/procedure  - ECHO onde on 3/17 noted above, shows EF of 50%  - Ortho is following  - s/p R hip IMN on 3/18/22  - Holding anticoagulation for surgery, can be restarted postop day 2  - At baseline patient can climb 2 flight of stairs but has limited activity due to dementia.  - PT/OT/Rehab   - WBAT RLE  - OOBTC BID with assistance    #Aortic Dissection s/p Repair   - No intervention warranted per vascular  - Patient was seen by vascular in July 2021  - S/p open arch and ascending aortic aneurysm repair, and had a large DTA dissection and aneurysm.   - S/p TEVAR and right renal stenting for addressing the issue.  - Monitor and outpatient vascular follow up     #H/o AVR  #Chronic A Fib on Eliquis at home  - c/w Eliquis, c/w Multac, c/w Metoprolol  - TTE on 3/17 shows EF of 50%    #H/o Hypothyroidism  - Check TSH  - c/w Synthroid QD    #H/o Anxiety  - c/w Mirtazapine  - c/w Alprazolam TID PRN    #Misc   - DVT prophylaxis: Eliquis   - GI prophylaxis: no indicated   - Diet: DASH/TLC  - Activity status: WBAT to the R side, OOBTC BID   - Disposition: discharge today

## 2022-03-21 NOTE — DIETITIAN INITIAL EVALUATION ADULT. - SIGNS/SYMPTOMS
as evidenced by RN rpeort of poor po vs. variable fair/good PO in EMR? as evidenced by RN report of poor po vs. variable fair/good PO in EMR?

## 2022-03-21 NOTE — DIETITIAN INITIAL EVALUATION ADULT. - NAME AND PHONE
Nutrition Intervention:meals and snacks,medical food supplements, coordination of care; Nutrition Monitoring:diet order,energy intake,body composition,NFPF

## 2022-03-21 NOTE — DISCHARGE NOTE NURSING/CASE MANAGEMENT/SOCIAL WORK - NSDCPEFALRISK_GEN_ALL_CORE
For information on Fall & Injury Prevention, visit: [email or web address] OR  [email or web address] OR  https://www.cdc.gov/steadi/patient.html

## 2022-03-21 NOTE — DIETITIAN INITIAL EVALUATION ADULT. - PERTINENT MEDS FT
MEDICATIONS  (STANDING):  apixaban 2.5 milliGRAM(s) Oral every 12 hours  aspirin  chewable 81 milliGRAM(s) Oral daily  atorvastatin 40 milliGRAM(s) Oral at bedtime  dronedarone 400 milliGRAM(s) Oral two times a day  levothyroxine 50 MICROGram(s) Oral daily  metoprolol succinate ER 50 milliGRAM(s) Oral daily  mirtazapine 30 milliGRAM(s) Oral at bedtime  polyethylene glycol 3350 17 Gram(s) Oral daily  senna 2 Tablet(s) Oral at bedtime

## 2022-03-21 NOTE — PROGRESS NOTE ADULT - SUBJECTIVE AND OBJECTIVE BOX
s/p right hip orif pod 3  pt seen at bedside resting comfortably nad   pain controlled     Vital Signs Last 24 Hrs  T(C): 35.8 (21 Mar 2022 00:00), Max: 35.9 (20 Mar 2022 07:56)  T(F): 96.5 (21 Mar 2022 00:00), Max: 96.6 (20 Mar 2022 07:56)  HR: 70 (21 Mar 2022 00:00) (70 - 84)  BP: 144/64 (21 Mar 2022 00:00) (102/55 - 144/64)  BP(mean): --  RR: 18 (21 Mar 2022 00:00) (18 - 18)  SpO2: --                          8.2    11.58 )-----------( 249      ( 21 Mar 2022 06:30 )             25.3       right hip: staples in place c/d/i   nvid   calf soft nt   ipc x 2

## 2022-03-21 NOTE — DIETITIAN INITIAL EVALUATION ADULT. - OTHER INFO
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  #Mechanical Fall  #Right hip fracture- s/p R hip IMN on 3/18/22  #Aortic Dissection s/p Repair - No intervention warranted per vascular    pertinent subjective information: Per RN, pt had very little food for breakfast today. EMR PO doc: 3/19-3/21: 0-100%. Supplement intake: 0-100% as well. However, no active order for nutrition supplements noted. EMR shows overall good PO -- will need to keep monitoring pt. No chewing/swallowing difficulty reported.
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
## 2022-03-29 DIAGNOSIS — S72.141A DISPLACED INTERTROCHANTERIC FRACTURE OF RIGHT FEMUR, INITIAL ENCOUNTER FOR CLOSED FRACTURE: ICD-10-CM

## 2022-03-29 DIAGNOSIS — Z96.651 PRESENCE OF RIGHT ARTIFICIAL KNEE JOINT: ICD-10-CM

## 2022-03-29 DIAGNOSIS — Z87.891 PERSONAL HISTORY OF NICOTINE DEPENDENCE: ICD-10-CM

## 2022-03-29 DIAGNOSIS — D62 ACUTE POSTHEMORRHAGIC ANEMIA: ICD-10-CM
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
## 2022-03-29 DIAGNOSIS — Z79.01 LONG TERM (CURRENT) USE OF ANTICOAGULANTS: ICD-10-CM
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
## 2022-03-29 DIAGNOSIS — F41.9 ANXIETY DISORDER, UNSPECIFIED: ICD-10-CM

## 2022-03-29 DIAGNOSIS — I48.20 CHRONIC ATRIAL FIBRILLATION, UNSPECIFIED: ICD-10-CM

## 2022-03-29 DIAGNOSIS — E03.9 HYPOTHYROIDISM, UNSPECIFIED: ICD-10-CM

## 2022-03-29 DIAGNOSIS — Z98.890 OTHER SPECIFIED POSTPROCEDURAL STATES: ICD-10-CM

## 2022-03-29 DIAGNOSIS — I48.0 PAROXYSMAL ATRIAL FIBRILLATION: ICD-10-CM

## 2022-03-29 DIAGNOSIS — R33.9 RETENTION OF URINE, UNSPECIFIED: ICD-10-CM

## 2022-07-10 NOTE — ED CDU PROVIDER INITIAL DAY NOTE - PROGRESS NOTE3
[facility]  Vascular Neurology  Comprehensive Stroke Center  TeleVascular Neurology Acute Consultation Note      Consults    Consulting Provider: TOMMY KING  Current Providers  No providers found    Patient Location:  [facility] TELEMETRY Emergency Department  Spoke hospital nurse at bedside with patient assisting consultant.     Patient information was obtained from patient.         Assessment/Plan:       Diagnoses:   * Cerebrovascular accident (CVA)  No tPA - out of window     Antithrombotics for secondary stroke prevention: Antiplatelets: Aspirin: 81 mg daily  Clopidogrel: 300 mg loading dose x 1, now  Clopidogrel: 75 mg daily    Statins for secondary stroke prevention and hyperlipidemia, if present:   Statins: Atorvastatin- 40 mg daily    Aggressive risk factor modification: HTN     Rehab efforts: The patient has been evaluated by a stroke team provider and the therapy needs have been fully considered based off the presenting complaints and exam findings. The following therapy evaluations are needed: PT evaluate and treat, OT evaluate and treat, SLP evaluate and treat, PM&R evaluate for appropriate placement    Diagnostics ordered/pending: CTA Head to assess vasculature , CTA Neck/Arch to assess vasculature, HgbA1C to assess blood glucose levels, Lipid Profile to assess cholesterol levels, MRI head without contrast to assess brain parenchyma, TTE to assess cardiac function/status , TSH to assess thyroid function    VTE prophylaxis: Heparin 5000 units SQ every 8 hours  Mechanical prophylaxis: Place SCDs    BP parameters: Infarct: No intervention, SBP <220            STROKE DOCUMENTATION     Acute Stroke Times:   Acute Stroke Times   Last Known Normal Date: 07/09/22  Last Known Normal Time: 2200  Symptom Onset Date: 07/09/22  Unknown Symptom Onset Date: Unknown Date  Symptom Onset Time: 0200  Unknown Symptom Onset Time: Unknown Time  Stroke Team Called Date: 07/10/22  Stroke  "Team Called Time: 0527  Stroke Team Arrival Date: 07/10/22  Stroke Team Arrival Time: 0530  CT Interpretation Time: 0535  Alteplase Recommended: No  Thrombectomy Recommended: No    NIH Scale:  Interval: baseline  1a. Level of Consciousness: 0-->Alert, keenly responsive  1b. LOC Questions: 0-->Answers both questions correctly  1c. LOC Commands: 0-->Performs both tasks correctly  2. Best Gaze: 0-->Normal  3. Visual: 0-->No visual loss  4. Facial Palsy: 2-->Partial paralysis (total or near-total paralysis of lower face)  5a. Motor Arm, Left: 1-->Drift, limb holds 90 (or 45) degrees, but drifts down before full 10 seconds, does not hit bed or other support  5b. Motor Arm, Right: 0-->No drift, limb holds 90 (or 45) degrees for full 10 secs  6a. Motor Leg, Left: 0-->No drift, leg holds 30 degree position for full 5 secs  6b. Motor Leg, Right: 0-->No drift, leg holds 30 degree position for full 5 secs  7. Limb Ataxia: 0-->Absent  8. Sensory: 0-->Normal, no sensory loss  9. Best Language: 0-->No aphasia, normal  10. Dysarthria: 1-->Mild-to-moderate dysarthria, patient slurs at least some words and, at worst, can be understood with some difficulty  11. Extinction and Inattention (formerly Neglect): 0-->No abnormality  Total (NIH Stroke Scale): 4     Modified [place]    Bandy Coma Scale:    ABCD2 Score:    [id number] Score:   HAS -BLED Score:   ICH Score:   Hunt & Gilmore Classification:       Blood pressure (!) 172/113, pulse 73, temperature 98.3 °F (36.8 °C), temperature source Oral, resp. rate 18, height 6' 7" (2.007 m), weight 69.5 kg (153 lb 3.5 oz), SpO2 96 %.  Alteplase Eligible?: No  Alteplase Recommendation: Alteplase not recommended due to Outside of treatment window   Possible Interventional Revascularization Candidate? No; at this time symptoms not suggestive of large vessel occlusion    Disposition Recommendation: do not transfer    Subjective:     History of Present Illness:  Pt presented to the ED with symptoms " "of left sided weakness and slurring of speech. Symptoms noted on waking up at 2 am. He did not have similar symptoms on going to bed at 10 pm.         Woke up with symptoms?: yes    Recent bleeding noted: no  Does the patient take any Blood Thinners? no  Medications: Unknown      Past Medical History: hypertension    Past Surgical History: no major surgeries within the last 2 weeks    Family History: hypertension    Social History: unable to obtain    Allergies: Pcn [Penicillins]  Sulfa (Sulfonamide Antibiotics) No relevant allergies    Review of Systems  Objective:   Vitals: Blood pressure (!) 172/113, pulse 73, temperature 98.3 °F (36.8 °C), temperature source Oral, resp. rate 18, height 6' 7" (2.007 m), weight 69.5 kg (153 lb 3.5 oz), SpO2 96 %. BP: chart reviewed    CT READ: Yes  No hemmorhage. No mass effect. No early infarct signs.     Physical Exam    Left face droop   Left arm weakness   dysarthria       Recommended the emergency room physician to have a brief discussion with the patient and/or family if available regarding the  risks and benefits of treatment, and to briefly document the occurrence of that discussion in his clinical encounter note.     The attending portion of this evaluation, treatment, and documentation was performed per Skyler Delgado MD via audiovisual.    Billing code:  (non-intervention mild to moderate stroke, TIA, some mimics)    · This patient has a critical neurological condition/illness, with some potential for high morbidity and mortality.  · There is a moderate probability for acute neurological change leading to clinical and possibly life-threatening deterioration requiring highest level of physician preparedness for urgent intervention.  · Care was coordinated with other physicians involved in the patient's care.  · Radiologic studies and laboratory data were reviewed and interpreted, and plan of care was re-assessed based on the results.  · Diagnosis, treatment " options and prognosis may have been discussed with the patient and/or family members or caregiver.      In your opinion, this was a: Tier 2 Van Negative    Consult End Time: 12:25 AM     Skyler Delgaod MD  [facility] Stroke Center  Vascular Neurology   [facility]   Improved.

## 2023-03-23 ENCOUNTER — INPATIENT (INPATIENT)
Facility: HOSPITAL | Age: 83
LOS: 4 days | Discharge: NURSING, REHAB OR LONG-TERM CARE | DRG: 481 | End: 2023-03-28
Attending: INTERNAL MEDICINE | Admitting: INTERNAL MEDICINE
Payer: MEDICARE

## 2023-03-23 VITALS
TEMPERATURE: 98 F | OXYGEN SATURATION: 93 % | HEART RATE: 83 BPM | RESPIRATION RATE: 18 BRPM | SYSTOLIC BLOOD PRESSURE: 137 MMHG | DIASTOLIC BLOOD PRESSURE: 84 MMHG

## 2023-03-23 DIAGNOSIS — S72.142A DISPLACED INTERTROCHANTERIC FRACTURE OF LEFT FEMUR, INITIAL ENCOUNTER FOR CLOSED FRACTURE: ICD-10-CM

## 2023-03-23 LAB
ABO RH CONFIRMATION: SIGNIFICANT CHANGE UP
ALBUMIN SERPL ELPH-MCNC: 3.6 G/DL — SIGNIFICANT CHANGE UP (ref 3.5–5.2)
ALP SERPL-CCNC: 70 U/L — SIGNIFICANT CHANGE UP (ref 30–115)
ALT FLD-CCNC: 7 U/L — SIGNIFICANT CHANGE UP (ref 0–41)
ANION GAP SERPL CALC-SCNC: 8 MMOL/L — SIGNIFICANT CHANGE UP (ref 7–14)
APTT BLD: 30.6 SEC — SIGNIFICANT CHANGE UP (ref 27–39.2)
AST SERPL-CCNC: 14 U/L — SIGNIFICANT CHANGE UP (ref 0–41)
BASOPHILS # BLD AUTO: 0.04 K/UL — SIGNIFICANT CHANGE UP (ref 0–0.2)
BASOPHILS NFR BLD AUTO: 0.4 % — SIGNIFICANT CHANGE UP (ref 0–1)
BILIRUB SERPL-MCNC: 0.3 MG/DL — SIGNIFICANT CHANGE UP (ref 0.2–1.2)
BLD GP AB SCN SERPL QL: SIGNIFICANT CHANGE UP
BUN SERPL-MCNC: 16 MG/DL — SIGNIFICANT CHANGE UP (ref 10–20)
CALCIUM SERPL-MCNC: 8.9 MG/DL — SIGNIFICANT CHANGE UP (ref 8.4–10.5)
CHLORIDE SERPL-SCNC: 99 MMOL/L — SIGNIFICANT CHANGE UP (ref 98–110)
CO2 SERPL-SCNC: 31 MMOL/L — SIGNIFICANT CHANGE UP (ref 17–32)
CREAT SERPL-MCNC: 0.6 MG/DL — LOW (ref 0.7–1.5)
EGFR: 90 ML/MIN/1.73M2 — SIGNIFICANT CHANGE UP
EOSINOPHIL # BLD AUTO: 0.28 K/UL — SIGNIFICANT CHANGE UP (ref 0–0.7)
EOSINOPHIL NFR BLD AUTO: 2.8 % — SIGNIFICANT CHANGE UP (ref 0–8)
ETHANOL SERPL-MCNC: <10 MG/DL — SIGNIFICANT CHANGE UP
GLUCOSE SERPL-MCNC: 113 MG/DL — HIGH (ref 70–99)
HCT VFR BLD CALC: 42.3 % — SIGNIFICANT CHANGE UP (ref 37–47)
HGB BLD-MCNC: 13.6 G/DL — SIGNIFICANT CHANGE UP (ref 12–16)
IMM GRANULOCYTES NFR BLD AUTO: 0.4 % — HIGH (ref 0.1–0.3)
INR BLD: 1.09 RATIO — SIGNIFICANT CHANGE UP (ref 0.65–1.3)
LACTATE SERPL-SCNC: 1.1 MMOL/L — SIGNIFICANT CHANGE UP (ref 0.7–2)
LIDOCAIN IGE QN: 70 U/L — HIGH (ref 7–60)
LYMPHOCYTES # BLD AUTO: 0.78 K/UL — LOW (ref 1.2–3.4)
LYMPHOCYTES # BLD AUTO: 7.9 % — LOW (ref 20.5–51.1)
MCHC RBC-ENTMCNC: 29.8 PG — SIGNIFICANT CHANGE UP (ref 27–31)
MCHC RBC-ENTMCNC: 32.2 G/DL — SIGNIFICANT CHANGE UP (ref 32–37)
MCV RBC AUTO: 92.6 FL — SIGNIFICANT CHANGE UP (ref 81–99)
MONOCYTES # BLD AUTO: 0.47 K/UL — SIGNIFICANT CHANGE UP (ref 0.1–0.6)
MONOCYTES NFR BLD AUTO: 4.8 % — SIGNIFICANT CHANGE UP (ref 1.7–9.3)
NEUTROPHILS # BLD AUTO: 8.22 K/UL — HIGH (ref 1.4–6.5)
NEUTROPHILS NFR BLD AUTO: 83.7 % — HIGH (ref 42.2–75.2)
NRBC # BLD: 0 /100 WBCS — SIGNIFICANT CHANGE UP (ref 0–0)
PLATELET # BLD AUTO: 185 K/UL — SIGNIFICANT CHANGE UP (ref 130–400)
POTASSIUM SERPL-MCNC: 4.4 MMOL/L — SIGNIFICANT CHANGE UP (ref 3.5–5)
POTASSIUM SERPL-SCNC: 4.4 MMOL/L — SIGNIFICANT CHANGE UP (ref 3.5–5)
PROT SERPL-MCNC: 7.1 G/DL — SIGNIFICANT CHANGE UP (ref 6–8)
PROTHROM AB SERPL-ACNC: 12.5 SEC — SIGNIFICANT CHANGE UP (ref 9.95–12.87)
RBC # BLD: 4.57 M/UL — SIGNIFICANT CHANGE UP (ref 4.2–5.4)
RBC # FLD: 13 % — SIGNIFICANT CHANGE UP (ref 11.5–14.5)
SARS-COV-2 RNA SPEC QL NAA+PROBE: SIGNIFICANT CHANGE UP
SODIUM SERPL-SCNC: 138 MMOL/L — SIGNIFICANT CHANGE UP (ref 135–146)
TROPONIN T SERPL-MCNC: <0.01 NG/ML — SIGNIFICANT CHANGE UP
WBC # BLD: 9.83 K/UL — SIGNIFICANT CHANGE UP (ref 4.8–10.8)
WBC # FLD AUTO: 9.83 K/UL — SIGNIFICANT CHANGE UP (ref 4.8–10.8)

## 2023-03-23 PROCEDURE — 74177 CT ABD & PELVIS W/CONTRAST: CPT | Mod: 26,MA

## 2023-03-23 PROCEDURE — C1713: CPT

## 2023-03-23 PROCEDURE — C1769: CPT

## 2023-03-23 PROCEDURE — 73562 X-RAY EXAM OF KNEE 3: CPT | Mod: 26,LT

## 2023-03-23 PROCEDURE — 85025 COMPLETE CBC W/AUTO DIFF WBC: CPT

## 2023-03-23 PROCEDURE — 80048 BASIC METABOLIC PNL TOTAL CA: CPT

## 2023-03-23 PROCEDURE — 93010 ELECTROCARDIOGRAM REPORT: CPT

## 2023-03-23 PROCEDURE — 86850 RBC ANTIBODY SCREEN: CPT

## 2023-03-23 PROCEDURE — 93010 ELECTROCARDIOGRAM REPORT: CPT | Mod: 77

## 2023-03-23 PROCEDURE — 80053 COMPREHEN METABOLIC PANEL: CPT

## 2023-03-23 PROCEDURE — 72170 X-RAY EXAM OF PELVIS: CPT | Mod: 26

## 2023-03-23 PROCEDURE — 83735 ASSAY OF MAGNESIUM: CPT

## 2023-03-23 PROCEDURE — 71260 CT THORAX DX C+: CPT | Mod: 26,MA

## 2023-03-23 PROCEDURE — U0005: CPT

## 2023-03-23 PROCEDURE — 99291 CRITICAL CARE FIRST HOUR: CPT | Mod: GC

## 2023-03-23 PROCEDURE — 97162 PT EVAL MOD COMPLEX 30 MIN: CPT | Mod: GP

## 2023-03-23 PROCEDURE — 86900 BLOOD TYPING SEROLOGIC ABO: CPT

## 2023-03-23 PROCEDURE — 71045 X-RAY EXAM CHEST 1 VIEW: CPT | Mod: 26

## 2023-03-23 PROCEDURE — 99222 1ST HOSP IP/OBS MODERATE 55: CPT

## 2023-03-23 PROCEDURE — 97110 THERAPEUTIC EXERCISES: CPT | Mod: GP

## 2023-03-23 PROCEDURE — 97116 GAIT TRAINING THERAPY: CPT | Mod: GP

## 2023-03-23 PROCEDURE — 99223 1ST HOSP IP/OBS HIGH 75: CPT | Mod: AI

## 2023-03-23 PROCEDURE — 93306 TTE W/DOPPLER COMPLETE: CPT

## 2023-03-23 PROCEDURE — 73501 X-RAY EXAM HIP UNI 1 VIEW: CPT | Mod: LT

## 2023-03-23 PROCEDURE — C9399: CPT

## 2023-03-23 PROCEDURE — 93306 TTE W/DOPPLER COMPLETE: CPT | Mod: 26

## 2023-03-23 PROCEDURE — 97530 THERAPEUTIC ACTIVITIES: CPT | Mod: GP

## 2023-03-23 PROCEDURE — 36415 COLL VENOUS BLD VENIPUNCTURE: CPT

## 2023-03-23 PROCEDURE — C1889: CPT

## 2023-03-23 PROCEDURE — 86901 BLOOD TYPING SEROLOGIC RH(D): CPT

## 2023-03-23 PROCEDURE — 72125 CT NECK SPINE W/O DYE: CPT | Mod: 26,MA

## 2023-03-23 PROCEDURE — 73552 X-RAY EXAM OF FEMUR 2/>: CPT | Mod: 26,LT

## 2023-03-23 PROCEDURE — 99223 1ST HOSP IP/OBS HIGH 75: CPT

## 2023-03-23 PROCEDURE — 70450 CT HEAD/BRAIN W/O DYE: CPT | Mod: 26,MA

## 2023-03-23 PROCEDURE — U0003: CPT

## 2023-03-23 PROCEDURE — 93005 ELECTROCARDIOGRAM TRACING: CPT

## 2023-03-23 RX ORDER — MORPHINE SULFATE 50 MG/1
2 CAPSULE, EXTENDED RELEASE ORAL ONCE
Refills: 0 | Status: DISCONTINUED | OUTPATIENT
Start: 2023-03-23 | End: 2023-03-23

## 2023-03-23 RX ORDER — DRONEDARONE 400 MG/1
400 TABLET, FILM COATED ORAL
Refills: 0 | Status: DISCONTINUED | OUTPATIENT
Start: 2023-03-23 | End: 2023-03-24

## 2023-03-23 RX ORDER — MIRTAZAPINE 45 MG/1
1 TABLET, ORALLY DISINTEGRATING ORAL
Qty: 0 | Refills: 0 | DISCHARGE

## 2023-03-23 RX ORDER — ENOXAPARIN SODIUM 100 MG/ML
40 INJECTION SUBCUTANEOUS EVERY 24 HOURS
Refills: 0 | Status: DISCONTINUED | OUTPATIENT
Start: 2023-03-24 | End: 2023-03-24

## 2023-03-23 RX ORDER — MORPHINE SULFATE 50 MG/1
2 CAPSULE, EXTENDED RELEASE ORAL EVERY 6 HOURS
Refills: 0 | Status: DISCONTINUED | OUTPATIENT
Start: 2023-03-23 | End: 2023-03-24

## 2023-03-23 RX ORDER — SODIUM CHLORIDE 9 MG/ML
1000 INJECTION, SOLUTION INTRAVENOUS
Refills: 0 | Status: DISCONTINUED | OUTPATIENT
Start: 2023-03-23 | End: 2023-03-28

## 2023-03-23 RX ORDER — ROSUVASTATIN CALCIUM 5 MG/1
1 TABLET ORAL
Qty: 0 | Refills: 0 | DISCHARGE

## 2023-03-23 RX ORDER — APIXABAN 2.5 MG/1
1 TABLET, FILM COATED ORAL
Qty: 0 | Refills: 0 | DISCHARGE

## 2023-03-23 RX ORDER — ASPIRIN/CALCIUM CARB/MAGNESIUM 324 MG
1 TABLET ORAL
Qty: 0 | Refills: 0 | DISCHARGE

## 2023-03-23 RX ORDER — METOPROLOL TARTRATE 50 MG
50 TABLET ORAL DAILY
Refills: 0 | Status: DISCONTINUED | OUTPATIENT
Start: 2023-03-23 | End: 2023-03-24

## 2023-03-23 RX ORDER — LEVOTHYROXINE SODIUM 125 MCG
50 TABLET ORAL DAILY
Refills: 0 | Status: DISCONTINUED | OUTPATIENT
Start: 2023-03-23 | End: 2023-03-24

## 2023-03-23 RX ADMIN — MORPHINE SULFATE 2 MILLIGRAM(S): 50 CAPSULE, EXTENDED RELEASE ORAL at 06:42

## 2023-03-23 RX ADMIN — MORPHINE SULFATE 2 MILLIGRAM(S): 50 CAPSULE, EXTENDED RELEASE ORAL at 11:06

## 2023-03-23 RX ADMIN — SODIUM CHLORIDE 75 MILLILITER(S): 9 INJECTION, SOLUTION INTRAVENOUS at 11:05

## 2023-03-23 RX ADMIN — DRONEDARONE 400 MILLIGRAM(S): 400 TABLET, FILM COATED ORAL at 17:30

## 2023-03-23 NOTE — ED PROVIDER NOTE - OBJECTIVE STATEMENT
83 yo f ho afib on eliquis, htn, hld, hypothyroid, dementia presents from home sp unwitnessed fall. As per collateral from family (, daughter via phone call) pt had unwitnessed fall from bed. C/o of LT hip pain. Denies LOC, head trauma, nausea, vomiting, abd pain, sob, difficulty breathing   Called daughter Madhavi (?) via phone [phone] who confirmed pt's medical history

## 2023-03-23 NOTE — CONSULT NOTE ADULT - SUBJECTIVE AND OBJECTIVE BOX
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 Pt demented at baseline and does not remember what happened. As per family (daughter via phone call) pt had unwitnessed fall from bed. C/o of LT hip pain, otherwise no other complaints. Mental state at baseline AAO1-2, currently at baseline. Denies LOC, head trauma, nausea, vomiting, abd pain, sob, difficulty breathing.    In the ED, trauma w/u as below, significant for acute left intertrochanteric fx, ortho onboard, planned for surgery later today.Pt given 2mg morphine in ED.  Vitals: Vital Signs Last 24 Hrs  T(C): 36.6 (23 Mar 2023 04:52), Max: 36.6 (23 Mar 2023 04:52)  T(F): 97.9 (23 Mar 2023 04:52), Max: 97.9 (23 Mar 2023 04:52)  HR: 83 (23 Mar 2023 04:52) (83 - 83)  BP: 137/84 (23 Mar 2023 04:52) (137/84 - 137/84)  BP(mean): --  RR: 18 (23 Mar 2023 04:52) (18 - 18)  SpO2: 93% (23 Mar 2023 04:52) (93% - 93%)    Parameters below as of 23 Mar 2023 04:52  Patient On (Oxygen Delivery Method): room air    Labs: unremarkable    Imaging:  < from: CT Abdomen and Pelvis w/ IV Cont (03.23.23 @ 06:41) >  IMPRESSION:    Acute left femoral neck intertrochanteric comminuted fracture.    Increased in size descending thoracic aorta/abdominal aortic aneurysm   measuring up to 5.3 cm in diameter (4-261) at the diaphragmatic hiatus,   previously 4.7 cm.    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 In retrospect likely present since at   least July 2021 though much less conspicuous due to different phase of   contrast. Further evaluation can be considered as clinically warranted.    < end of copied text >  < from: CT Cervical Spine No Cont (03.23.23 @ 06:31) >  IMPRESSION:    No evidence of acute traumatic cervical spine injury.    < end of copied text >  < from: CT Head No Cont (03.23.23 @ 06:16) >  IMPRESSION:  No evidence of acute intracranial pathology.    < end of copied text >  < from: Xray Pelvis AP only (03.23.23 @ 05:53) >  IMPRESSION:    Bones are diffusely osteopenic.    Acute minimally displaced left femoral intertrochanteric fracture with   avulsion of the lesser trochanter. No dislocation.    Chronic right intertrochanteric fracture status post intramedullary gamma   nail.    Bilateral hip osteoarthritis. Lumbosacral spine degenerative changes    < end of copied text >  < from: Xray Chest 1 View AP/PA (03.23.23 @ 05:53) >  Impression:    Bibasilar lung atelectasis, left greater than right    < end of copied text >   (23 Mar 2023 10:23)          PAST MEDICAL & SURGICAL HISTORY  Hypertension    Hyperlipidemia    Hypothyroidism    No significant past surgical history        FAMILY HISTORY:  FAMILY HISTORY:      SOCIAL HISTORY:  Social History:      ALLERGIES:  No Known Allergies      MEDICATIONS:  dronedarone 400 milliGRAM(s) Oral two times a day  lactated ringers. 1000 milliLiter(s) (75 mL/Hr) IV Continuous <Continuous>  levothyroxine 50 MICROGram(s) Oral daily  metoprolol succinate ER 50 milliGRAM(s) Oral daily    PRN:  morphine  - Injectable 2 milliGRAM(s) IV Push every 6 hours PRN      HOME MEDICATIONS:  Home Medications:  dronedarone 400 mg oral tablet: 1 tab(s) orally 2 times a day (17 Mar 2022 04:31)  levothyroxine 50 mcg (0.05 mg) oral tablet: 1 tab(s) orally once a day (17 Mar 2022 04:31)  Metoprolol Succinate ER 50 mg oral tablet, extended release: 1 tab(s) orally once a day (17 Mar 2022 04:31)      VITALS:   T(F): 97.9 (03-23 @ 04:52), Max: 97.9 (03-23 @ 04:52)  HR: 83 (03-23 @ 04:52) (83 - 83)  BP: 137/84 (03-23 @ 04:52) (137/84 - 137/84)  BP(mean): --  RR: 18 (03-23 @ 04:52) (18 - 18)  SpO2: 93% (03-23 @ 04:52) (93% - 93%)    I&O's Summary      REVIEW OF SYSTEMS:  unable to obtain    PHYSICAL EXAM:  General: Not in distress.    HEENT: EOMI  Cardio: regular, S1, S2, PER 3/6 in apex and aortic area  Pulm: B/L BS.     Abdomen: Soft, non-tender, non-distended. Normoactive bowel sounds  Extremities: No edema b/l le  Neuro: A&O x 2. No focal deficits    LABS:                        13.6   9.83  )-----------( 185      ( 23 Mar 2023 05:45 )             42.3     03-23    138  |  99  |  16  ----------------------------<  113<H>  4.4   |  31  |  0.6<L>    Ca    8.9      23 Mar 2023 05:45    TPro  7.1  /  Alb  3.6  /  TBili  0.3  /  DBili  x   /  AST  14  /  ALT  7   /  AlkPhos  70  03-23    PT/INR - ( 23 Mar 2023 05:45 )   PT: 12.50 sec;   INR: 1.09 ratio         PTT - ( 23 Mar 2023 05:45 )  PTT:30.6 sec  Lactate, Blood: 1.1 mmol/L (03-23-23 @ 05:45)  Troponin T, Serum: <0.01 ng/mL (03-23-23 @ 05:45)    CARDIAC MARKERS ( 23 Mar 2023 05:45 )  x     / <0.01 ng/mL / x     / x     / x            Troponin trend:        COVID-19 PCR: NotDetec (23 Mar 2023 06:30)      RADIOLOGY:    < from: CT Chest w/ IV Cont (03.23.23 @ 06:41) >  IMPRESSION:    Acute left femoral neck intertrochanteric comminuted fracture.    Increased in size descending thoracic aorta/abdominal aortic aneurysm   measuring up to 5.3 cm in diameter (4-261) at the diaphragmatic hiatus,   previously 4.7 cm.    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 In retrospect likely present since at   least July 2021 though much less conspicuous due to different phase of   contrast. Further evaluation can be considered as clinically warranted.    --- End of Report ---    < end of copied text >    < from: TTE Echo Complete w/ Contrast w/ Doppler (03.17.22 @ 16:08) >  Summary:   1. LV Ejection Fraction by Silva's Method with a biplane EF of 50 %.   2. Left atrium is partially compressed by descending aorta stent graft.   3. Mildly enlarged left atrium.   4. Normal right atrial size.   5. Degenerative mitral valve.   6. Mild mitral valve regurgitation.   7. Mild thickening of the anterior and posterior mitral valve leaflets.   8. Moderate mitral annular calcification.   9. Bioprosthesis in the aortic position.  10. Mild perivalvular aortic regurgitation.    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 Pt demented at baseline and does not remember what happened. As per family (daughter via phone call) pt had unwitnessed fall from bed. C/o of LT hip pain, otherwise no other complaints. Mental state at baseline AAO1-2, currently at baseline. Denies LOC, head trauma, nausea, vomiting, abd pain, sob, difficulty breathing.    In the ED, trauma w/u as below, significant for acute left intertrochanteric fx, ortho onboard, planned for surgery later today.Pt given 2mg morphine in ED.  Vitals: Vital Signs Last 24 Hrs  T(C): 36.6 (23 Mar 2023 04:52), Max: 36.6 (23 Mar 2023 04:52)  T(F): 97.9 (23 Mar 2023 04:52), Max: 97.9 (23 Mar 2023 04:52)  HR: 83 (23 Mar 2023 04:52) (83 - 83)  BP: 137/84 (23 Mar 2023 04:52) (137/84 - 137/84)  BP(mean): --  RR: 18 (23 Mar 2023 04:52) (18 - 18)  SpO2: 93% (23 Mar 2023 04:52) (93% - 93%)    Parameters below as of 23 Mar 2023 04:52  Patient On (Oxygen Delivery Method): room air    Labs: unremarkable    Imaging:  < from: CT Abdomen and Pelvis w/ IV Cont (03.23.23 @ 06:41) >  IMPRESSION:    Acute left femoral neck intertrochanteric comminuted fracture.    Increased in size descending thoracic aorta/abdominal aortic aneurysm   measuring up to 5.3 cm in diameter (4-261) at the diaphragmatic hiatus,   previously 4.7 cm.    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 In retrospect likely present since at   least July 2021 though much less conspicuous due to different phase of   contrast. Further evaluation can be considered as clinically warranted.    < end of copied text >  < from: CT Cervical Spine No Cont (03.23.23 @ 06:31) >  IMPRESSION:    No evidence of acute traumatic cervical spine injury.    < end of copied text >  < from: CT Head No Cont (03.23.23 @ 06:16) >  IMPRESSION:  No evidence of acute intracranial pathology.    < end of copied text >  < from: Xray Pelvis AP only (03.23.23 @ 05:53) >  IMPRESSION:    Bones are diffusely osteopenic.    Acute minimally displaced left femoral intertrochanteric fracture with   avulsion of the lesser trochanter. No dislocation.    Chronic right intertrochanteric fracture status post intramedullary gamma   nail.    Bilateral hip osteoarthritis. Lumbosacral spine degenerative changes    < end of copied text >  < from: Xray Chest 1 View AP/PA (03.23.23 @ 05:53) >  Impression:    Bibasilar lung atelectasis, left greater than right    < end of copied text >   (23 Mar 2023 10:23)          PAST MEDICAL & SURGICAL HISTORY  Hypertension    Hyperlipidemia    Hypothyroidism    No significant past surgical history        FAMILY HISTORY:  FAMILY HISTORY:      SOCIAL HISTORY:  Social History:      ALLERGIES:  No Known Allergies      MEDICATIONS:  dronedarone 400 milliGRAM(s) Oral two times a day  lactated ringers. 1000 milliLiter(s) (75 mL/Hr) IV Continuous <Continuous>  levothyroxine 50 MICROGram(s) Oral daily  metoprolol succinate ER 50 milliGRAM(s) Oral daily    PRN:  morphine  - Injectable 2 milliGRAM(s) IV Push every 6 hours PRN      HOME MEDICATIONS:  Home Medications:  dronedarone 400 mg oral tablet: 1 tab(s) orally 2 times a day (17 Mar 2022 04:31)  levothyroxine 50 mcg (0.05 mg) oral tablet: 1 tab(s) orally once a day (17 Mar 2022 04:31)  Metoprolol Succinate ER 50 mg oral tablet, extended release: 1 tab(s) orally once a day (17 Mar 2022 04:31)      VITALS:   T(F): 97.9 (03-23 @ 04:52), Max: 97.9 (03-23 @ 04:52)  HR: 83 (03-23 @ 04:52) (83 - 83)  BP: 137/84 (03-23 @ 04:52) (137/84 - 137/84)  BP(mean): --  RR: 18 (03-23 @ 04:52) (18 - 18)  SpO2: 93% (03-23 @ 04:52) (93% - 93%)    I&O's Summary      REVIEW OF SYSTEMS:  unable to obtain    PHYSICAL EXAM:  General: Not in distress.    HEENT: EOMI  Cardio: regular, S1, S2, PER 3/6 in apex and aortic area  Pulm: B/L BS.     Abdomen: Soft, non-tender, non-distended. Normoactive bowel sounds  Extremities: No edema b/l   Neuro: A&O x 2.     LABS:                        13.6   9.83  )-----------( 185      ( 23 Mar 2023 05:45 )             42.3     03-23    138  |  99  |  16  ----------------------------<  113<H>  4.4   |  31  |  0.6<L>    Ca    8.9      23 Mar 2023 05:45    TPro  7.1  /  Alb  3.6  /  TBili  0.3  /  DBili  x   /  AST  14  /  ALT  7   /  AlkPhos  70  03-23    PT/INR - ( 23 Mar 2023 05:45 )   PT: 12.50 sec;   INR: 1.09 ratio         PTT - ( 23 Mar 2023 05:45 )  PTT:30.6 sec  Lactate, Blood: 1.1 mmol/L (03-23-23 @ 05:45)  Troponin T, Serum: <0.01 ng/mL (03-23-23 @ 05:45)    CARDIAC MARKERS ( 23 Mar 2023 05:45 )  x     / <0.01 ng/mL / x     / x     / x            Troponin trend:        COVID-19 PCR: NotDetec (23 Mar 2023 06:30)      RADIOLOGY:    < from: CT Chest w/ IV Cont (03.23.23 @ 06:41) >  IMPRESSION:    Acute left femoral neck intertrochanteric comminuted fracture.    Increased in size descending thoracic aorta/abdominal aortic aneurysm   measuring up to 5.3 cm in diameter (4-261) at the diaphragmatic hiatus,   previously 4.7 cm.    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 In retrospect likely present since at   least July 2021 though much less conspicuous due to different phase of   contrast. Further evaluation can be considered as clinically warranted.    --- End of Report ---    < end of copied text >    < from: TTE Echo Complete w/ Contrast w/ Doppler (03.17.22 @ 16:08) >  Summary:   1. LV Ejection Fraction by Silva's Method with a biplane EF of 50 %.   2. Left atrium is partially compressed by descending aorta stent graft.   3. Mildly enlarged left atrium.   4. Normal right atrial size.   5. Degenerative mitral valve.   6. Mild mitral valve regurgitation.   7. Mild thickening of the anterior and posterior mitral valve leaflets.   8. Moderate mitral annular calcification.   9. Bioprosthesis in the aortic position.  10. Mild perivalvular aortic regurgitation.    < end of copied text >

## 2023-03-23 NOTE — ED ADULT TRIAGE NOTE - CHIEF COMPLAINT QUOTE
pt BIBEMS from home for unwitnessed mechanical fall out of bed, denies loc, head injury, blood thinners, complaining left hip pain

## 2023-03-23 NOTE — CONSULT NOTE ADULT - SUBJECTIVE AND OBJECTIVE BOX
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 She complains of left  hip pain, constant, sharp,   the pt one year ago sustained a R IT fx underwent an orif , post op course went well.  the pts performance status has declined over this past year no longer climbing stairs minimal household ambulator, her dementia has worsened over the past year as well.      Allergies and Intolerances:        Allergies:  	No Known Allergies:     Home Medications:   * Patient Currently Takes Medications as of 17-Mar-2022 04:31 documented in Structured Notes  · 	levothyroxine 50 mcg (0.05 mg) oral tablet: Last Dose Taken:  , 1 tab(s) orally once a day  · 	dronedarone 400 mg oral tablet: Last Dose Taken:  , 1 tab(s) orally 2 times a day  · 	Metoprolol Succinate ER 50 mg oral tablet, extended release: Last Dose Taken:  , 1 tab(s) orally once a day  · 	aspirin 81 mg oral tablet: Last Dose Taken:  , 1 tab(s) orally once a day  · 	Crestor 10 mg oral tablet: Last Dose Taken:  , 1 tab(s) orally once a day  · 	LORazepam 0.5 mg oral tablet: Last Dose Taken:  , 1 tab(s) orally 3 times a day, As Needed  · 	mirtazapine 30 mg oral tablet: Last Dose Taken:  , 1 tab(s) orally once a day (at bedtime)  · 	Eliquis 2.5 mg oral tablet: Last Dose Taken:  , 1 tab(s) orally 2 times a day                          13.6   9.83  )-----------( 185      ( 23 Mar 2023 05:45 )             42.3   03-23    138  |  99  |  16  ----------------------------<  113<H>  4.4   |  31  |  0.6<L>    Ca    8.9      23 Mar 2023 05:45    TPro  7.1  /  Alb  3.6  /  TBili  0.3  /  DBili  x   /  AST  14  /  ALT  7   /  AlkPhos  70  03-23    PT/INR - ( 23 Mar 2023 05:45 )   PT: 12.50 sec;   INR: 1.09 ratio      PTT - ( 23 Mar 2023 05:45 )  PTT:30.6 sec    physical   the pt has an obvious deformity of the LLE, painful to motion nvi   left  total knee no gross instability   the right hip and knee moves well  surgical sites are well healed.  no other obvious musculoskeletal findings at this time      imaging  right hip orif implant in good position and alignment, fx healed   left hip fracture basi cervical with lesser troch fx  displaced   total knee appears stable well fixed    a/p   left hip fracture   plan for orif this evening   maintain npo ,hold AC,   cardiology called to update there risk assessment from 1 year ago   medical evaluation pending   The pt has no capacity to sign consent by her daughter the hcp  Samantha Soria [phone]   we have called the family and they have agreed  to surgery                              HPI: 81 year old female, PMHx of Anxiety, Paroxysmal AFib no longer on eliquis for approx. 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 She complains of left  hip pain, constant, sharp,   the pt one year ago sustained a R IT fx underwent an orif , post op course went well.  the pts performance status has declined over this past year no longer climbing stairs minimal household ambulator, her dementia has worsened over the past year as well.      Allergies and Intolerances:        Allergies:  	No Known Allergies:     Home Medications:   * Patient Currently Takes Medications as of 17-Mar-2022 04:31 documented in Structured Notes  · 	levothyroxine 50 mcg (0.05 mg) oral tablet: Last Dose Taken:  , 1 tab(s) orally once a day  · 	dronedarone 400 mg oral tablet: Last Dose Taken:  , 1 tab(s) orally 2 times a day  · 	Metoprolol Succinate ER 50 mg oral tablet, extended release: Last Dose Taken:  , 1 tab(s) orally once a day  · 	aspirin 81 mg oral tablet: Last Dose Taken:  , 1 tab(s) orally once a day  · 	Crestor 10 mg oral tablet: Last Dose Taken:  , 1 tab(s) orally once a day  · 	LORazepam 0.5 mg oral tablet: Last Dose Taken:  , 1 tab(s) orally 3 times a day, As Needed  · 	mirtazapine 30 mg oral tablet: Last Dose Taken:  , 1 tab(s) orally once a day (at bedtime)  · 	Eliquis 2.5 mg oral tablet: Last Dose Taken:  , 1 tab(s) orally 2 times a day                          13.6   9.83  )-----------( 185      ( 23 Mar 2023 05:45 )             42.3   03-23    138  |  99  |  16  ----------------------------<  113<H>  4.4   |  31  |  0.6<L>    Ca    8.9      23 Mar 2023 05:45    TPro  7.1  /  Alb  3.6  /  TBili  0.3  /  DBili  x   /  AST  14  /  ALT  7   /  AlkPhos  70  03-23    PT/INR - ( 23 Mar 2023 05:45 )   PT: 12.50 sec;   INR: 1.09 ratio      PTT - ( 23 Mar 2023 05:45 )  PTT:30.6 sec    physical   the pt has an obvious deformity of the LLE, painful to motion nvi   left  total knee no gross instability   the right hip and knee moves well  surgical sites are well healed.  no other obvious musculoskeletal findings at this time      imaging  right hip orif implant in good position and alignment, fx healed   left hip fracture basi cervical with lesser troch fx  displaced   total knee appears stable well fixed    a/p   left hip fracture   plan for orif this evening   maintain npo ,hold AC,   cardiology called to update there risk assessment from 1 year ago   medical evaluation pending   The pt has no capacity to sign consent by her daughter the hcp  Samantha Soria [phone]   we have called the family and they have agreed  to surgery

## 2023-03-23 NOTE — CONSULT NOTE ADULT - ASSESSMENT
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      - I reviewed today's labs  - I reviewed and personally visualized all the radiology imagings  - I discussed the plan with attending Dr. 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      - I reviewed today's labs  - I reviewed and personally visualized all the radiology imagings  - I discussed the plan with attending Dr. Conte:  - CT scan reviewed with attending: no need for any vascular surgeries at this time  - pt can proceed with ortho surgery  - pt should follow up with vascular surgery outpt - as per daughter they have a vascular surgeon in [facility] [id number]

## 2023-03-23 NOTE — CONSULT NOTE ADULT - SUBJECTIVE AND OBJECTIVE BOX
TRAUMA ACTIVATION LEVEL:  CODE / ALERT  / CONSULT  ACTIVATED BY: EMS**  /  ED**  INTUBATED: YES** / NO**      MECHANISM OF INJURY:   [] Blunt     [] MVC	  [x] Fall	  [] Pedestrian Struck	  [] Motorcycle     [] Assault     [] Bicycle collision    [] Sports injury    [] Penetrating    [] Gun Shot Wound      [] Stab Wound    GCS: 15 	E: 4	V: 4	M: 6    HPI:    82yF w/ PMHx of Afib (eliquis) seen as Trauma Alert s/p unwitnessed fall from bed. -HT, -LOC, +AC (eliquis). Trauma assessment in ED: ABCs intact , GCS 14 , AAOx3.    PAST MEDICAL & SURGICAL HISTORY:  Hypertension      Hyperlipidemia      Hypothyroidism      No significant past surgical history          Allergies    No Known Allergies    Intolerances      Home Medications:  acetaminophen 325 mg oral tablet: 3 tab(s) orally every 6 hours, As needed, Mild Pain (1 - 3) (21 Mar 2022 09:37)  aspirin 81 mg oral tablet: 1 tab(s) orally once a day (17 Mar 2022 04:31)  Crestor 10 mg oral tablet: 1 tab(s) orally once a day (17 Mar 2022 04:31)  dronedarone 400 mg oral tablet: 1 tab(s) orally 2 times a day (17 Mar 2022 04:31)  Eliquis 2.5 mg oral tablet: 1 tab(s) orally 2 times a day (17 Mar 2022 04:31)  levothyroxine 50 mcg (0.05 mg) oral tablet: 1 tab(s) orally once a day (17 Mar 2022 04:31)  LORazepam 0.5 mg oral tablet: 1 tab(s) orally 3 times a day, As Needed (17 Mar 2022 04:31)  Metoprolol Succinate ER 50 mg oral tablet, extended release: 1 tab(s) orally once a day (17 Mar 2022 04:31)  mirtazapine 30 mg oral tablet: 1 tab(s) orally once a day (at bedtime) (17 Mar 2022 04:31)      ROS: 10-system review is otherwise negative except HPI above.      Primary Survey:    A - airway intact  B - bilateral breath sounds and good chest rise  C - palpable pulses in all extremities  D - GCS 14 on arrival, CHEEK  Exposure obtained    Vital Signs Last 24 Hrs  T(C): 36.6 (23 Mar 2023 04:52), Max: 36.6 (23 Mar 2023 04:52)  T(F): 97.9 (23 Mar 2023 04:52), Max: 97.9 (23 Mar 2023 04:52)  HR: 83 (23 Mar 2023 04:52) (83 - 83)  BP: 137/84 (23 Mar 2023 04:52) (137/84 - 137/84)  BP(mean): --  RR: 18 (23 Mar 2023 04:52) (18 - 18)  SpO2: 93% (23 Mar 2023 04:52) (93% - 93%)    Parameters below as of 23 Mar 2023 04:52  Patient On (Oxygen Delivery Method): room air    Secondary Survey:   General: NAD  HEENT: Normocephalic, atraumatic, EOMI, PEERLA. no scalp lacerations   Neck: Soft, midline trachea. no c-spine tenderness  Chest: No chest wall tenderness, no subcutaneous emphysema   Cardiac: S1, S2  Respiratory: Bilateral breath sounds, normal respiratory effort   Abdomen: Soft, non-distended, non-tender, no rebound, no guarding  Groin: Normal appearing, pelvis stable   Ext:  Moving b/l upper and lower extremities (Decrease LLE ROM 2/2 pain). LLE shortened and externally rotated. Palpable Radial b/l UE, b/l DP palpable in LE.   Back: No T/L/S spine tenderness, No palpable runoff/stepoff/deformity  Rectal: good tone    Labs:  CAPILLARY BLOOD GLUCOSE                          13.6   9.83  )-----------( 185      ( 23 Mar 2023 05:45 )             42.3       Auto Neutrophil %: 83.7 % (03-23-23 @ 05:45)  Auto Immature Granulocyte %: 0.4 % (03-23-23 @ 05:45)            LFTs:         Coags:                  RADIOLOGY & ADDITIONAL STUDIES:    ---------------------------------------------------------------------------------------     TRAUMA ACTIVATION LEVEL:  CODE / ALERT  / CONSULT  ACTIVATED BY: EMS**  /  ED**  INTUBATED: YES** / NO**      MECHANISM OF INJURY:   [] Blunt     [] MVC	  [x] Fall	  [] Pedestrian Struck	  [] Motorcycle     [] Assault     [] Bicycle collision    [] Sports injury    [] Penetrating    [] Gun Shot Wound      [] Stab Wound    GCS: 15 	E: 4	V: 4	M: 6    HPI:  82yF w/ PMHx of Afib (on Eliquis), Aortic Dissection s/p covered stent in descending Aorta, HTN, HLD, hypothyroidism, Anxiety, AVR seen as Trauma Alert s/p unwitnessed fall from bed. -HT, -LOC, +AC (eliquis). Patient was attempting to get up from her bed and fell on to her left side. Patient could not stand up afterwards and was brought into the ED. Patient complaining of mild left hip pain. Trauma assessment in ED: ABCs intact , GCS 14 , AAOx3.    PAST MEDICAL & SURGICAL HISTORY:  Hypertension  Hyperlipidemia  Hypothyroidism    No significant past surgical history    Allergies  No Known Allergies    Intolerances    Home Medications:  acetaminophen 325 mg oral tablet: 3 tab(s) orally every 6 hours, As needed, Mild Pain (1 - 3) (21 Mar 2022 09:37)  aspirin 81 mg oral tablet: 1 tab(s) orally once a day (17 Mar 2022 04:31)  Crestor 10 mg oral tablet: 1 tab(s) orally once a day (17 Mar 2022 04:31)  dronedarone 400 mg oral tablet: 1 tab(s) orally 2 times a day (17 Mar 2022 04:31)  Eliquis 2.5 mg oral tablet: 1 tab(s) orally 2 times a day (17 Mar 2022 04:31)  levothyroxine 50 mcg (0.05 mg) oral tablet: 1 tab(s) orally once a day (17 Mar 2022 04:31)  LORazepam 0.5 mg oral tablet: 1 tab(s) orally 3 times a day, As Needed (17 Mar 2022 04:31)  Metoprolol Succinate ER 50 mg oral tablet, extended release: 1 tab(s) orally once a day (17 Mar 2022 04:31)  mirtazapine 30 mg oral tablet: 1 tab(s) orally once a day (at bedtime) (17 Mar 2022 04:31)    ROS: 10-system review is otherwise negative except HPI above.      Primary Survey:    A - airway intact  B - bilateral breath sounds and good chest rise  C - palpable pulses in all extremities  D - GCS 14 on arrival, CHEEK  Exposure obtained    Vital Signs Last 24 Hrs  T(C): 36.6 (23 Mar 2023 04:52), Max: 36.6 (23 Mar 2023 04:52)  T(F): 97.9 (23 Mar 2023 04:52), Max: 97.9 (23 Mar 2023 04:52)  HR: 83 (23 Mar 2023 04:52) (83 - 83)  BP: 137/84 (23 Mar 2023 04:52) (137/84 - 137/84)  BP(mean): --  RR: 18 (23 Mar 2023 04:52) (18 - 18)  SpO2: 93% (23 Mar 2023 04:52) (93% - 93%)    Parameters below as of 23 Mar 2023 04:52  Patient On (Oxygen Delivery Method): room air    Secondary Survey:   General: NAD  HEENT: Normocephalic, atraumatic, EOMI, PEERLA. no scalp lacerations   Neck: Soft, midline trachea. no c-spine tenderness  Chest: No chest wall tenderness, no subcutaneous emphysema   Cardiac: S1, S2  Respiratory: Bilateral breath sounds, normal respiratory effort   Abdomen: Soft, non-distended, non-tender, no rebound, no guarding  Groin: Normal appearing, pelvis stable   Ext:  Moving b/l upper and lower extremities (Decrease LLE ROM 2/2 pain). LLE shortened and externally rotated. Palpable Radial b/l UE, b/l DP palpable in LE.   Back: No T/L/S spine tenderness, No palpable runoff/stepoff/deformity  Rectal: good tone    Labs:  CAPILLARY BLOOD GLUCOSE                        13.6   9.83  )-----------( 185      ( 23 Mar 2023 05:45 )             42.3       Auto Neutrophil %: 83.7 % (03-23-23 @ 05:45)  Auto Immature Granulocyte %: 0.4 % (03-23-23 @ 05:45)    03-23    138  |  99  |  16  ----------------------------<  113<H>  4.4   |  31  |  0.6<L>    Calcium, Total Serum: 8.9 mg/dL (03-23-23 @ 05:45)    LFTs:             7.1  | 0.3  | 14       ------------------[70      ( 23 Mar 2023 05:45 )  3.6  | x    | 7           Lipase:70     Amylase:x         Lactate, Blood: 1.1 mmol/L (03-23-23 @ 05:45)    Coags:     12.50  ----< 1.09    ( 23 Mar 2023 05:45 )     30.6      CARDIAC MARKERS ( 23 Mar 2023 05:45 )  x     / <0.01 ng/mL / x     / x     / x        RADIOLOGY & ADDITIONAL STUDIES:  < from: Xray Pelvis AP only (03.23.23 @ 05:53) >  IMPRESSION:  Bones are diffusely osteopenic.    Acute minimally displaced left femoral intertrochanteric fracture with   avulsion of the lesser trochanter. No dislocation.    Chronic right intertrochanteric fracture status post intramedullary gamma   nail.    Bilateral hip osteoarthritis. Lumbosacral spine degenerative changes  --- End of Report ---    < from: CT Head No Cont (03.23.23 @ 06:16) >  FINDINGS:  Ventricles and cortical sulcal pattern are age appropriate and consistent   with a mild degree of parenchymal volume loss.    Gray-white differentiation is maintained. No evidence of recent   territorial infarction.    No acute intracranial hemorrhage or extra-axial fluid collection.    Decreased attenuation within the periventricular and subcortical white   matter is again noted without mass effect which is most likely secondary   to chronic microvascular ischemic changes.    No depressed calvarial fracture. Mastoids are well aerated.  --- End of Report ---    < from: CT Cervical Spine No Cont (03.23.23 @ 06:31) >  IMPRESSION:  No evidence of acute traumatic cervical spine injury.  --- End of Report --    < from: CT Chest w/ IV Cont (03.23.23 @ 06:41) >  IMPRESSION:  Acute left femoral neck intertrochanteric comminuted fracture.    Increased in size descending thoracic aorta/abdominal aortic aneurysm   measuring up to 5.3 cm in diameter (4-261) at the diaphragmatic hiatus,   previously 4.7 cm.    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 In retrospect likely present since at   least July 2021 though much less conspicuous due to different phase of   contrast. Further evaluation can be considered as clinically warranted.  --- End of Report ---    ---------------------------------------------------------------------------------------    FRAILTY SCORE:  Fatigue: How much time during the previous 4 weeks did you feel tired?   All or most of the time [   ] Yes (1pt)    [x  ] No  (0pts)  Resistance: Do you have any difficulty walking up 10 steps alone without resting and without aids? [x  ] Yes (1pt)    [  ] No  (0pts)  Ambulation: Do you have any difficulty walking several hundred yards alone without aids? [ x  ] Yes (1pt)    [  ] No  (0pts)  Illness: how many illnesses do you have out of list of 11 total? [x   ] 5 or more (1pt) [  ] < 5 (0pts)  Loss of weight: Have you had weight loss of 5% or more? [   ] Yes (1pt)    [ x ] No  (0pts)    Total Score: 3    Score 1-2: Consult medical comangement  Score 3-4: Consult Geriatric service   Score 5: Consult Palliative service [phone]    Kim Najera G, Anuj HERNANDEZ, Alex ORTEZ, Janiya B. Frality: toward a clinical definition. J [employer] Med Dir [employer]oc. 2008; 9 (2): 71-72  Narciso JE, Berkley TK, Matt DK. A simple frailty questionnaire (FRAIL) predicts outcomes in middle aged  Americans. J Nutr Health Aging. 2012; 16 (7): 601-608

## 2023-03-23 NOTE — CONSULT NOTE ADULT - SUBJECTIVE AND OBJECTIVE BOX
VASCULAR SURGERY CONSULT NOTE      HPI:  81 yo f with Anxiety, Paroxysmal AFib on eliquis, Aortic Dissection s/p extensive covered stent placement in descending aorta , HTN, HLD, Hypothyroidism, Aortic Valve Replacement here after unwitnessed fall. Pt demented at baseline and does not remember what happened. As per family (daughter via phone call) pt had unwitnessed fall from bed. C/o of LT hip pain, otherwise no other complaints. Mental state at baseline AAO1-2, currently at baseline. Denies LOC, head trauma, nausea, vomiting, abd pain, sob, difficulty breathing.    In the ED, trauma w/u as below, significant for acute left intertrochanteric fx, ortho onboard, planned for surgery later today.Pt given 2mg morphine in ED.  Vitals: Vital Signs Last 24 Hrs  T(C): 36.6 (23 Mar 2023 04:52), Max: 36.6 (23 Mar 2023 04:52)  T(F): 97.9 (23 Mar 2023 04:52), Max: 97.9 (23 Mar 2023 04:52)  HR: 83 (23 Mar 2023 04:52) (83 - 83)  BP: 137/84 (23 Mar 2023 04:52) (137/84 - 137/84)  BP(mean): --  RR: 18 (23 Mar 2023 04:52) (18 - 18)  SpO2: 93% (23 Mar 2023 04:52) (93% - 93%)    Parameters below as of 23 Mar 2023 04:52  Patient On (Oxygen Delivery Method): room air    Labs: unremarkable    Imaging:  < from: CT Abdomen and Pelvis w/ IV Cont (03.23.23 @ 06:41) >  IMPRESSION:    Acute left femoral neck intertrochanteric comminuted fracture.    Increased in size descending thoracic aorta/abdominal aortic aneurysm   measuring up to 5.3 cm in diameter (4-261) at the diaphragmatic hiatus,   previously 4.7 cm.    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 In retrospect likely present since at   least July 2021 though much less conspicuous due to different phase of   contrast. Further evaluation can be considered as clinically warranted.    < end of copied text >  < from: CT Cervical Spine No Cont (03.23.23 @ 06:31) >  IMPRESSION:    No evidence of acute traumatic cervical spine injury.    < end of copied text >  < from: CT Head No Cont (03.23.23 @ 06:16) >  IMPRESSION:  No evidence of acute intracranial pathology.    < end of copied text >  < from: Xray Pelvis AP only (03.23.23 @ 05:53) >  IMPRESSION:    Bones are diffusely osteopenic.    Acute minimally displaced left femoral intertrochanteric fracture with   avulsion of the lesser trochanter. No dislocation.    Chronic right intertrochanteric fracture status post intramedullary gamma   nail.    Bilateral hip osteoarthritis. Lumbosacral spine degenerative changes    < end of copied text >  < from: Xray Chest 1 View AP/PA (03.23.23 @ 05:53) >  Impression:    Bibasilar lung atelectasis, left greater than right    < end of copied text >   (23 Mar 2023 10:23)        PAST MEDICAL & SURGICAL HISTORY:  Hypertension      Hyperlipidemia      Hypothyroidism      No significant past surgical history        No Known Allergies    Home Medications:  dronedarone 400 mg oral tablet: 1 tab(s) orally 2 times a day (17 Mar 2022 04:31)  levothyroxine 50 mcg (0.05 mg) oral tablet: 1 tab(s) orally once a day (17 Mar 2022 04:31)  Metoprolol Succinate ER 50 mg oral tablet, extended release: 1 tab(s) orally once a day (17 Mar 2022 04:31)    No permtinent family history of PVD    REVIEW OF SYSTEMS:  GENERAL:                                         negative  SKIN:                                                 negative  OPTHALMOLOGIC:                          negative  ENMT:                                               negative  RESPIRATORY AND THORAX:        negative  CARDIOVASCULAR:                         see HPI  GASTROINTESTINAL:                       negative  NEPHROLOGY:                                  negative  MUSCULOSKELETAL:                       negative  NEUROLOGIC:                                   negative  PSYCHIATRIC:                                    negative  HEMATOLOGY/LYMPHATICS:         negative  ENDOCRINE:                                     see HPI  ALLERGIC/IMMUNOLOGIC:            negative    12 point ROS otherwise normal except as stated in HPI  FHx: none  SHX:  [ ]  smoking     [ ] alcohol use    PHYSICAL EXAM  Vital Signs Last 24 Hrs  T(C): 36.6 (23 Mar 2023 04:52), Max: 36.6 (23 Mar 2023 04:52)  T(F): 97.9 (23 Mar 2023 04:52), Max: 97.9 (23 Mar 2023 04:52)  HR: 83 (23 Mar 2023 04:52) (83 - 83)  BP: 137/84 (23 Mar 2023 04:52) (137/84 - 137/84)  BP(mean): --  RR: 18 (23 Mar 2023 04:52) (18 - 18)  SpO2: 93% (23 Mar 2023 04:52) (93% - 93%)    Parameters below as of 23 Mar 2023 04:52  Patient On (Oxygen Delivery Method): room air        Appearance: Normal	  HEENT:   Normal oral mucosa, PERRL, EOMI	  Neck: Supple, - JVD;   Cardiovascular: Normal S1 S2, No JVD, No murmurs,   Respiratory: Lungs clear to auscultation, No Rales, Rhonchi, Wheezing	  Gastrointestinal:  Soft, Non-tender, positive BS	  Skin: No rashes, No ecchymoses, No cyanosis  Extremities: Normal range of motion, No clubbing, cyanosis or edema  Neurologic: Non-focal  Psychiatry: A & O x 3, Mood & affect appropriate      PULSES:  Femoral:  Popliteal:  Dorsal Pedal:  Posterior Tibial:  Capillary:    MEDICATIONS:   MEDICATIONS  (STANDING):  dronedarone 400 milliGRAM(s) Oral two times a day  lactated ringers. 1000 milliLiter(s) (75 mL/Hr) IV Continuous <Continuous>  levothyroxine 50 MICROGram(s) Oral daily  metoprolol succinate ER 50 milliGRAM(s) Oral daily    MEDICATIONS  (PRN):  morphine  - Injectable 2 milliGRAM(s) IV Push every 6 hours PRN Severe Pain (7 - 10)      LAB/STUDIES:                        13.6   9.83  )-----------( 185      ( 23 Mar 2023 05:45 )             42.3     03-23    138  |  99  |  16  ----------------------------<  113<H>  4.4   |  31  |  0.6<L>    Ca    8.9      23 Mar 2023 05:45    TPro  7.1  /  Alb  3.6  /  TBili  0.3  /  DBili  x   /  AST  14  /  ALT  7   /  AlkPhos  70  03-23    PT/INR - ( 23 Mar 2023 05:45 )   PT: 12.50 sec;   INR: 1.09 ratio         PTT - ( 23 Mar 2023 05:45 )  PTT:30.6 sec  LIVER FUNCTIONS - ( 23 Mar 2023 05:45 )  Alb: 3.6 g/dL / Pro: 7.1 g/dL / ALK PHOS: 70 U/L / ALT: 7 U/L / AST: 14 U/L / GGT: x               CARDIAC MARKERS ( 23 Mar 2023 05:45 )  x     / <0.01 ng/mL / x     / x     / x            IMAGING:  < from: CT Abdomen and Pelvis w/ IV Cont (03.23.23 @ 06:41) >  VASCULAR: Again noted focal dissection/vascular channel (6-293) at the   suprarenal abdominal aorta inferior to the right renal artery patent   covered stent, which appears smaller than previously. Descending thoracic   aortic aneurysm and abdominal aortic aneurysm measuring up to 5.3 cm in   diameter at the diaphragmatic hiatus. Stable moderate celiac artery   origin stenosis. Atherosclerosis of abdominal aorta and bilateral iliac   arteries.

## 2023-03-23 NOTE — ED PROVIDER NOTE - CONSIDERATION OF ADMISSION OBSERVATION
Patient status post fall will need operative intervention of hip fracture. Consideration of Admission/Observation

## 2023-03-23 NOTE — H&P ADULT - ATTENDING COMMENTS
patient seen and examined independently on rounds in the ED for the first time today , chart reviewed and discussed with medicine resident and agree with the above H/P and assessment and plan with the following addendum:     in brief, 83 yo woman with h/o PAF (previously on eliquis---stopped >1 mnth ago), Dementia, Aortic dissection s/p stent placement, htn, hld, AVR and hypothyroidism who p/w unwitnessed fall overnight while aide was helping .  She does not remember events leading up to fall but was found by aide next to bed and upon arrival to ED found to have L intertroch femoral fracture, admitted to medicine service with plan for OR with ortho later today.  She has a history of R hip fracture 1 year ago. METS<4  (h/o dementia and at baseline aaox1-2). no h/o prior reaction to anaesthesia    pcp- Dr. Araujo (but she has been doing telehealth visits with family member who is physician in [place]--Dr. Arredondo)    ROS- as per above otherwise review of systems unremarkable    PE:  GEN-NAD, AAOx1-2  PULM- Clear to auscultation bilaterally, fair air entry  CVS- +s1/s2 irreg irreg  GI- soft NT ND +bs  EXT- Left leg ext rotated and shortened  SKIN- no rashes/no lesions    labs/radiology reviewed    a/p:   #s/p fall at home---Left intertroch fracture  -ortho consult   -npo for OR repair today  -METS<4, limited mobility, patient is high risk for intermediate risk procedure  -pain control---avoid over sedation with opiates  -started on ivf LR @75 cc/hr  -cardiology following  -f/u echo  -hold noac (stopped eliquis >1 month ago as outpatient)--reeval for restarting post-op    #Hypothyroidism  -cont synthroid  -f/u tsh    #h/o Aortic Dissection  #Descending aortic aneurysm- increasing in size on CT  -family do not want further intervention   -can f/u with US as outpatient every few months    #PAF/HTN/HLD  -continue with rate control  -holding AC for procedure---restart post-op once ok by ortho    DNR/DNI    guarded prognosis   DVT/GI ppx patient seen and examined independently on rounds in the ED for the first time today , chart reviewed and discussed with medicine resident and agree with the above H/P and assessment and plan with the following addendum:     in brief, 83 yo woman with h/o PAF (previously on eliquis---stopped >1 mnth ago), Dementia, Aortic dissection s/p stent placement, htn, hld, AVR and hypothyroidism who p/w unwitnessed fall overnight while aide was helping .  She does not remember events leading up to fall but was found by aide next to bed and upon arrival to ED found to have L intertroch femoral fracture, admitted to medicine service with plan for OR with ortho later today.  She has a history of R hip fracture 1 year ago. METS<4  (h/o dementia and at baseline aaox1-2). no h/o prior reaction to anaesthesia    pcp- Dr. Araujo (but she has been doing telehealth visits with family member who is physician in [place]--Dr. Arredondo)    ROS- as per above otherwise review of systems unremarkable    PE:  GEN-NAD, AAOx1-2  PULM- Clear to auscultation bilaterally, fair air entry  CVS- +s1/s2 irreg irreg  GI- soft NT ND +bs  EXT- Left leg ext rotated and shortened  SKIN- no rashes/no lesions    labs/radiology reviewed    a/p:   #s/p fall at home---Left intertroch fracture  -ortho consult   -npo for OR repair today  -METS<4, limited mobility, patient is high risk for intermediate risk procedure  -pain control---avoid over sedation with opiates  -started on ivf LR @75 cc/hr  -cardiology following  -f/u echo  -hold noac (stopped eliquis >1 month ago as outpatient)--reeval for restarting post-op  -post-op PT/OT and early ambulation    #Hypothyroidism  -cont synthroid  -f/u tsh    #h/o Aortic Dissection  #Descending aortic aneurysm- increasing in size on CT  -family do not want further intervention   -can f/u with US as outpatient every few months    #PAF/HTN/HLD  -continue with rate control  -holding AC for procedure---restart post-op once ok by ortho    DNR/DNI    guarded prognosis   DVT/GI ppx

## 2023-03-23 NOTE — H&P ADULT - HISTORY OF PRESENT ILLNESS
83 yo f with Anxiety, Paroxysmal AFib on eliquis, Aortic Dissection s/p extensive covered stent placement in descending aorta , HTN, HLD, Hypothyroidism, Aortic Valve Replacement here after unwitnessed fall. Pt demented at baseline and does not remember what happened. As per family (daughter via phone call) pt had unwitnessed fall from bed. C/o of LT hip pain, otherwise no other complaints. Mental state at baseline AAO1-2, currently at baseline. Denies LOC, head trauma, nausea, vomiting, abd pain, sob, difficulty breathing.    In the ED, trauma w/u as below, significant for acute left intertrochanteric fx, ortho onboard, planned for surgery later today.Pt given 2mg morphine in ED.  Vitals: Vital Signs Last 24 Hrs  T(C): 36.6 (23 Mar 2023 04:52), Max: 36.6 (23 Mar 2023 04:52)  T(F): 97.9 (23 Mar 2023 04:52), Max: 97.9 (23 Mar 2023 04:52)  HR: 83 (23 Mar 2023 04:52) (83 - 83)  BP: 137/84 (23 Mar 2023 04:52) (137/84 - 137/84)  BP(mean): --  RR: 18 (23 Mar 2023 04:52) (18 - 18)  SpO2: 93% (23 Mar 2023 04:52) (93% - 93%)    Parameters below as of 23 Mar 2023 04:52  Patient On (Oxygen Delivery Method): room air    Labs: unremarkable    Imaging:  < from: CT Abdomen and Pelvis w/ IV Cont (03.23.23 @ 06:41) >  IMPRESSION:    Acute left femoral neck intertrochanteric comminuted fracture.    Increased in size descending thoracic aorta/abdominal aortic aneurysm   measuring up to 5.3 cm in diameter (4-261) at the diaphragmatic hiatus,   previously 4.7 cm.    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 In retrospect likely present since at   least July 2021 though much less conspicuous due to different phase of   contrast. Further evaluation can be considered as clinically warranted.    < end of copied text >  < from: CT Cervical Spine No Cont (03.23.23 @ 06:31) >  IMPRESSION:    No evidence of acute traumatic cervical spine injury.    < end of copied text >  < from: CT Head No Cont (03.23.23 @ 06:16) >  IMPRESSION:  No evidence of acute intracranial pathology.    < end of copied text >  < from: Xray Pelvis AP only (03.23.23 @ 05:53) >  IMPRESSION:    Bones are diffusely osteopenic.    Acute minimally displaced left femoral intertrochanteric fracture with   avulsion of the lesser trochanter. No dislocation.    Chronic right intertrochanteric fracture status post intramedullary gamma   nail.    Bilateral hip osteoarthritis. Lumbosacral spine degenerative changes    < end of copied text >  < from: Xray Chest 1 View AP/PA (03.23.23 @ 05:53) >  Impression:    Bibasilar lung atelectasis, left greater than right    < end of copied text >

## 2023-03-23 NOTE — CONSULT NOTE ADULT - NS ATTEND AMEND GEN_ALL_CORE FT
I personally reviewed the CTA- patient had open ascending aorta and arch repair at [place] a few years ago. 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 TEVAR was performed at [facility] same year.  She is following up with vascular surgery there.    The new CT shows distal to TEVAR, the AAA size is 5.3 form 4.9. She is asymptomatic but almost at size criteria to be fixed.  She can undergo hip surgery. Needs FU with her vascular surgeon- she needs PMEG (4 vessel) or if she is willing, open 4-vessel debranching and completion EVAR.

## 2023-03-23 NOTE — H&P ADULT - NSHPPHYSICALEXAM_GEN_ALL_CORE
GENERAL: NAD, lying in bed comfortably  HEAD:  Atraumatic, Normocephalic  EYES: conjunctiva and sclera clear  CHEST/LUNG: Clear to auscultation bilaterally; No rales, rhonchi, wheezing, or rubs. Unlabored respirations  HEART: Regular rate and rhythm; No murmurs, rubs, or gallops  ABDOMEN: Soft, nontender, nondistended  EXTREMITIES:  , pain at left hip   NERVOUS SYSTEM:  A&Ox2 to name and place

## 2023-03-23 NOTE — ED PROVIDER NOTE - PROGRESS NOTE DETAILS
SS Trauma alert called upon receiving pt's history. Trauma at bedside and evaluated.  XR +LT femur fracture. Ortho consulted - will evaluate.   Patient pending completion of imaging, ortho eval and dispo Patient signed out to Dr. Mccoy to follow-up imaging, labs, trauma, reassess and dispo. CORDELL: sign out received from Dr. Reed. Pt resting comfortably in ED. Pending CT chest, a/p reads and ortho evaluation. anticipate admission CORDELL: imaging shows isolated L intertrochanteric femoral fracture, ortho evaluated in ED with plan for likely OR fixation with Dr. Cardenas later today. Incidental findings on imaging include interval increase in size of thoracic AAA from 4.7cm -> 5.3 cm as well as R renal pole 1.3cm mass. I discussed with both of pt's daughters Burke(HCP) at [phone] and made them aware of both OR plan/admission as well as incidental findings. Pt admitted to medicine.

## 2023-03-23 NOTE — PATIENT PROFILE ADULT - FALL HARM RISK - HARM RISK INTERVENTIONS
Assistance with ambulation/Assistance OOB with selected safe patient handling equipment/Communicate Risk of Fall with Harm to all staff/Discuss with provider need for PT consult/Monitor gait and stability/Reinforce activity limits and safety measures with patient and family/Tailored Fall Risk Interventions/Visual Cue: Yellow wristband and red socks/Bed in lowest position, wheels locked, appropriate side rails in place/Call bell, personal items and telephone in reach/Instruct patient to call for assistance before getting out of bed or chair/Non-slip footwear when patient is out of bed/[place] to call system/Physically safe environment - no spills, clutter or unnecessary equipment/Purposeful Proactive Rounding/Room/bathroom lighting operational, light cord in reach

## 2023-03-23 NOTE — ED PROVIDER NOTE - PHYSICAL EXAMINATION
CONSTITUTIONAL: NAD  SKIN: Warm dry  HEAD: NCAT  EYES: NL inspection  ENT: MMM  NECK: Supple; non tender.  CARD: RRR  RESP: CTAB  ABD: S/NT no R/G  EXT: no pedal edema  +LT hip pain, ttp, +shortened, ER, sensation intact, bl LE motor strength, FROM RT LE   MSK: No midline spinal ttp c/t/l   NEURO: Grossly unremarkable  PSYCH: Cooperative, appropriate.

## 2023-03-23 NOTE — ED PROVIDER NOTE - ATTENDING CONTRIBUTION TO CARE
82-year-old female with PMH A-fib on Eliquis, HTN, HLD, hypothyroidism, dementia brought in by EMS for evaluation status post fall patient fell in bedroom onto left hip sustaining injury with swelling and discomfort and unable to bear weight.  Patient denies any LOC, headache, dizziness, nausea, vomiting, neck pain, chest pain, shortness of breath or abdominal pain.  States she thinks that  called 911 who she lives with at home.  Medical history obtained after calling ; trauma alert activated.  Fall was unwitnessed.    VITAL SIGNS: noted  CONSTITUTIONAL: Well-developed; well-nourished; in no acute distress  HEAD: Normocephalic; atraumatic  EYES: PERRL, EOM intact; conjunctiva and sclera clear  ENT: No nasal discharge; MMM  NECK: Supple; non tender. No anterior cervical lymphadenopathy note  CARD: S1, S2 normal; no murmurs, gallops, or rubs. Regular rate and rhythm  RESP: CTAB/L, no wheezes, rales or rhonchi  ABD: Normal bowel sounds; soft; non-distended; non-tender; no organomegaly. No CVA tenderness  EXT: Normal ROM RLE, LLE unable to range due to hip pain and swelling, tender to hip, no tenderness to femur, knee, shin, calf or foot.  No ecchymosis noted to hip. No calf tenderness or edema. Distal pulses intact  NEURO: Awake and alert, interactive. Grossly unremarkable. No focal deficits.  SKIN: Skin exam is warm and dry, no acute rash   MS: no midline spinal tenderness

## 2023-03-23 NOTE — H&P ADULT - NSHPLABSRESULTS_GEN_ALL_CORE
13.6   9.83  )-----------( 185      ( 23 Mar 2023 05:45 )             42.3       03-23    138  |  99  |  16  ----------------------------<  113<H>  4.4   |  31  |  0.6<L>    Ca    8.9      23 Mar 2023 05:45    TPro  7.1  /  Alb  3.6  /  TBili  0.3  /  DBili  x   /  AST  14  /  ALT  7   /  AlkPhos  70  03-23              PT/INR - ( 23 Mar 2023 05:45 )   PT: 12.50 sec;   INR: 1.09 ratio         PTT - ( 23 Mar 2023 05:45 )  PTT:30.6 sec

## 2023-03-23 NOTE — CONSULT NOTE ADULT - ASSESSMENT
IMPRESSION  s/p fall with left Hip Fx  Aortic Dissection s/p repair with covered stent. Now increased aneurysm size 5.3 from 4.7 cm  AS s/p bioprosthetic AV  A Fib on Eliquis  HTN, HLD, Hypothyroidism  Renal Cortical Mass    RECOMMENDATIONS  check EKG  hold Eliquis for now, resume post op when bleeding risk is acceptable  Vascular eval for increase in size of aneurysm  Pt is at high risk of MACE for intermediate risk procedure if EKG unremarkable however otherwise optimized from cardiac standpoint.  Will discuss with attending.    IMPRESSION  s/p fall with left Hip Fx  Aortic Dissection s/p repair with covered stent. Now increased aneurysm size 5.3 from 4.7 cm vascular following  AS s/p bioprosthetic AV  A Fib on Eliquis  HTN, HLD, Hypothyroidism  Renal Cortical Mass    RECOMMENDATIONS  check EKG  hold Eliquis for now, resume post op when bleeding risk is acceptable  c/w Dronedarone and metoprolol home dose  Pt is at high risk of MACE for intermediate risk procedure if EKG unremarkable however otherwise optimized from cardiac standpoint.  Will discuss with attending.    IMPRESSION  s/p fall with left Hip Fx  Aortic Dissection s/p repair with covered stent. Now increased aneurysm size 5.3 from 4.7 cm vascular following  AS s/p bioprosthetic AV  A Fib on Eliquis  HTN, HLD, Hypothyroidism  Renal Cortical Mass    RECOMMENDATIONS  hold Eliquis for now, resume post op when bleeding risk is acceptable  c/w Dronedarone and metoprolol home dose  Pt is at high risk of MACE for intermediate risk procedure if EKG unremarkable however otherwise optimized from cardiac standpoint.  Will discuss with attending.    IMPRESSION  s/p fall with left Hip Fx  Aortic Dissection s/p repair with covered stent. Now increased aneurysm size 5.3 from 4.7 cm vascular following  AS s/p bioprosthetic AV  A Fib on Eliquis  HTN, HLD, Hypothyroidism  Renal Cortical Mass    RECOMMENDATIONS  hold Eliquis for now, resume post op when bleeding risk is acceptable  c/w Dronedarone and metoprolol home dose  Pt is at moderate to high risk of MACE for intermediate risk procedure. no evidence of ACS, decompensated heart failure or significant arrhythmia at this point. Surgical intervention is needed in her case, echo normal EF    no need for further cardiac workup at this point prior to intervention.   rec EKG post OP  Try to maintain patient normotensive and euvolemic  monitor kidney function and electrolytes   continue metoprolol and give the day of intervention if no CI

## 2023-03-23 NOTE — CONSULT NOTE ADULT - ASSESSMENT
ASSESSMENT:  82y Female  w/ PMHx of *** seen as (Code Trauma / Trauma Alert / Trauma Consult) s/p ****** with complaint of *** , external signs of trauma include *** . Trauma assessment in ED: ABCs intact , GCS 14 , AAOx3,  CHEEK.     Injuries identified:   -   -   -     PLAN:   - Trauma Labs: (CBC, BMP, Coags, T&S, UA, EtOH level)  Additional studies:  EKG  Utox    Trauma Imaging to include the following:  - CXR, Pelvic Xray  - CT Head,  CT C-spine, CT Chest, CT Abd/Pelvis  - Extremity films: None    Additional consultations:  - Orthopedics    Disposition pending results of above labs and imaging  Above plan discussed with Trauma attending, Dr. Rodriguez, patient, patient family, and ED team  --------------------------------------------------------------------------------------  03-23-23 @ 06:22    TRAUMA SENIOR SPECTRA: [id number]  TRAUMA TEAM SPECTRA: [id number] ASSESSMENT:  82y Female  w/ PMHx of Afib (on Eliquis), Aortic Dissection s/p covered stent in descending Aorta, HTN, HLD, hypothyroidism, Anxiety, AVR seen as Trauma Alert s/p unwitnessed fall from bed. -HT, -LOC, +AC (eliquis). Patient complaining of mild left hip pain. . Trauma assessment in ED: ABCs intact , GCS 14 , AAOx3,  CHEEK.     Injuries identified:   - Acute left femoral neck intertrochanteric comminuted fx     PLAN:   -Ortho: planning for right hip ORIF today  - No other acute traumatic injuries  - Recommend medical admission  - Recommend vascular f/u for increasing AAA   - Possible urology consult for renal mass  - No further traumatic workup warranted  - Trauma to perform tertiary survey 3/24    - Trauma Labs: (CBC, BMP, Coags, T&S, UA, EtOH level)  Additional studies:  EKG  Utox    Trauma Imaging to include the following:  - CXR, Pelvic Xray  - CT Head,  CT C-spine, CT Chest, CT Abd/Pelvis  - Extremity films: Left hip, left femur     Additional consultations:  - Orthopedics  - Hospitalist     Disposition pending results of above labs and imaging  Above plan discussed with Trauma attending, Dr. Rodriguez, patient, patient family, and ED team  --------------------------------------------------------------------------------------  03-23-23 @ 06:22    TRAUMA SENIOR SPECTRA: [id number]  TRAUMA TEAM SPECTRA: [id number]

## 2023-03-24 LAB
ALBUMIN SERPL ELPH-MCNC: 3.4 G/DL — LOW (ref 3.5–5.2)
ALP SERPL-CCNC: 63 U/L — SIGNIFICANT CHANGE UP (ref 30–115)
ALT FLD-CCNC: 6 U/L — SIGNIFICANT CHANGE UP (ref 0–41)
ANION GAP SERPL CALC-SCNC: 10 MMOL/L — SIGNIFICANT CHANGE UP (ref 7–14)
AST SERPL-CCNC: 9 U/L — SIGNIFICANT CHANGE UP (ref 0–41)
BASOPHILS # BLD AUTO: 0.03 K/UL — SIGNIFICANT CHANGE UP (ref 0–0.2)
BASOPHILS NFR BLD AUTO: 0.3 % — SIGNIFICANT CHANGE UP (ref 0–1)
BILIRUB SERPL-MCNC: 0.4 MG/DL — SIGNIFICANT CHANGE UP (ref 0.2–1.2)
BLD GP AB SCN SERPL QL: SIGNIFICANT CHANGE UP
BUN SERPL-MCNC: 21 MG/DL — HIGH (ref 10–20)
CALCIUM SERPL-MCNC: 8.7 MG/DL — SIGNIFICANT CHANGE UP (ref 8.4–10.5)
CHLORIDE SERPL-SCNC: 102 MMOL/L — SIGNIFICANT CHANGE UP (ref 98–110)
CO2 SERPL-SCNC: 28 MMOL/L — SIGNIFICANT CHANGE UP (ref 17–32)
CREAT SERPL-MCNC: 0.6 MG/DL — LOW (ref 0.7–1.5)
EGFR: 90 ML/MIN/1.73M2 — SIGNIFICANT CHANGE UP
EOSINOPHIL # BLD AUTO: 0.17 K/UL — SIGNIFICANT CHANGE UP (ref 0–0.7)
EOSINOPHIL NFR BLD AUTO: 1.7 % — SIGNIFICANT CHANGE UP (ref 0–8)
GLUCOSE SERPL-MCNC: 99 MG/DL — SIGNIFICANT CHANGE UP (ref 70–99)
HCT VFR BLD CALC: 35.3 % — LOW (ref 37–47)
HGB BLD-MCNC: 11.2 G/DL — LOW (ref 12–16)
IMM GRANULOCYTES NFR BLD AUTO: 0.5 % — HIGH (ref 0.1–0.3)
LYMPHOCYTES # BLD AUTO: 0.77 K/UL — LOW (ref 1.2–3.4)
LYMPHOCYTES # BLD AUTO: 7.8 % — LOW (ref 20.5–51.1)
MAGNESIUM SERPL-MCNC: 2.1 MG/DL — SIGNIFICANT CHANGE UP (ref 1.8–2.4)
MCHC RBC-ENTMCNC: 30.1 PG — SIGNIFICANT CHANGE UP (ref 27–31)
MCHC RBC-ENTMCNC: 31.7 G/DL — LOW (ref 32–37)
MCV RBC AUTO: 94.9 FL — SIGNIFICANT CHANGE UP (ref 81–99)
MONOCYTES # BLD AUTO: 0.72 K/UL — HIGH (ref 0.1–0.6)
MONOCYTES NFR BLD AUTO: 7.3 % — SIGNIFICANT CHANGE UP (ref 1.7–9.3)
NEUTROPHILS # BLD AUTO: 8.19 K/UL — HIGH (ref 1.4–6.5)
NEUTROPHILS NFR BLD AUTO: 82.4 % — HIGH (ref 42.2–75.2)
NRBC # BLD: 0 /100 WBCS — SIGNIFICANT CHANGE UP (ref 0–0)
PLATELET # BLD AUTO: 184 K/UL — SIGNIFICANT CHANGE UP (ref 130–400)
POTASSIUM SERPL-MCNC: 4.2 MMOL/L — SIGNIFICANT CHANGE UP (ref 3.5–5)
POTASSIUM SERPL-SCNC: 4.2 MMOL/L — SIGNIFICANT CHANGE UP (ref 3.5–5)
PROT SERPL-MCNC: 6.3 G/DL — SIGNIFICANT CHANGE UP (ref 6–8)
RBC # BLD: 3.72 M/UL — LOW (ref 4.2–5.4)
RBC # FLD: 13.2 % — SIGNIFICANT CHANGE UP (ref 11.5–14.5)
SODIUM SERPL-SCNC: 140 MMOL/L — SIGNIFICANT CHANGE UP (ref 135–146)
WBC # BLD: 9.93 K/UL — SIGNIFICANT CHANGE UP (ref 4.8–10.8)
WBC # FLD AUTO: 9.93 K/UL — SIGNIFICANT CHANGE UP (ref 4.8–10.8)

## 2023-03-24 PROCEDURE — 99233 SBSQ HOSP IP/OBS HIGH 50: CPT

## 2023-03-24 RX ORDER — MORPHINE SULFATE 50 MG/1
2 CAPSULE, EXTENDED RELEASE ORAL EVERY 6 HOURS
Refills: 0 | Status: DISCONTINUED | OUTPATIENT
Start: 2023-03-24 | End: 2023-03-28

## 2023-03-24 RX ORDER — ENOXAPARIN SODIUM 100 MG/ML
40 INJECTION SUBCUTANEOUS EVERY 24 HOURS
Refills: 0 | Status: DISCONTINUED | OUTPATIENT
Start: 2023-03-25 | End: 2023-03-28

## 2023-03-24 RX ORDER — ONDANSETRON 8 MG/1
4 TABLET, FILM COATED ORAL ONCE
Refills: 0 | Status: DISCONTINUED | OUTPATIENT
Start: 2023-03-24 | End: 2023-03-26

## 2023-03-24 RX ORDER — OXYCODONE HYDROCHLORIDE 5 MG/1
10 TABLET ORAL EVERY 6 HOURS
Refills: 0 | Status: DISCONTINUED | OUTPATIENT
Start: 2023-03-24 | End: 2023-03-28

## 2023-03-24 RX ORDER — SODIUM CHLORIDE 9 MG/ML
1000 INJECTION, SOLUTION INTRAVENOUS
Refills: 0 | Status: DISCONTINUED | OUTPATIENT
Start: 2023-03-24 | End: 2023-03-26

## 2023-03-24 RX ORDER — HYDROMORPHONE HYDROCHLORIDE 2 MG/ML
0.5 INJECTION INTRAMUSCULAR; INTRAVENOUS; SUBCUTANEOUS
Refills: 0 | Status: DISCONTINUED | OUTPATIENT
Start: 2023-03-24 | End: 2023-03-26

## 2023-03-24 RX ORDER — CEFAZOLIN SODIUM 1 G
2000 VIAL (EA) INJECTION EVERY 8 HOURS
Refills: 0 | Status: COMPLETED | OUTPATIENT
Start: 2023-03-24 | End: 2023-03-25

## 2023-03-24 RX ORDER — LEVOTHYROXINE SODIUM 125 MCG
50 TABLET ORAL DAILY
Refills: 0 | Status: DISCONTINUED | OUTPATIENT
Start: 2023-03-24 | End: 2023-03-28

## 2023-03-24 RX ORDER — MORPHINE SULFATE 50 MG/1
4 CAPSULE, EXTENDED RELEASE ORAL
Refills: 0 | Status: DISCONTINUED | OUTPATIENT
Start: 2023-03-24 | End: 2023-03-26

## 2023-03-24 RX ORDER — DRONEDARONE 400 MG/1
400 TABLET, FILM COATED ORAL
Refills: 0 | Status: DISCONTINUED | OUTPATIENT
Start: 2023-03-24 | End: 2023-03-28

## 2023-03-24 RX ORDER — METOPROLOL TARTRATE 50 MG
50 TABLET ORAL DAILY
Refills: 0 | Status: DISCONTINUED | OUTPATIENT
Start: 2023-03-24 | End: 2023-03-28

## 2023-03-24 RX ORDER — ACETAMINOPHEN 500 MG
650 TABLET ORAL ONCE
Refills: 0 | Status: DISCONTINUED | OUTPATIENT
Start: 2023-03-24 | End: 2023-03-26

## 2023-03-24 RX ADMIN — Medication 50 MICROGRAM(S): at 05:42

## 2023-03-24 RX ADMIN — MORPHINE SULFATE 2 MILLIGRAM(S): 50 CAPSULE, EXTENDED RELEASE ORAL at 16:15

## 2023-03-24 RX ADMIN — Medication 50 MILLIGRAM(S): at 05:42

## 2023-03-24 RX ADMIN — MORPHINE SULFATE 2 MILLIGRAM(S): 50 CAPSULE, EXTENDED RELEASE ORAL at 10:06

## 2023-03-24 RX ADMIN — MORPHINE SULFATE 2 MILLIGRAM(S): 50 CAPSULE, EXTENDED RELEASE ORAL at 09:46

## 2023-03-24 RX ADMIN — DRONEDARONE 400 MILLIGRAM(S): 400 TABLET, FILM COATED ORAL at 05:42

## 2023-03-24 RX ADMIN — MORPHINE SULFATE 2 MILLIGRAM(S): 50 CAPSULE, EXTENDED RELEASE ORAL at 15:53

## 2023-03-24 RX ADMIN — DRONEDARONE 400 MILLIGRAM(S): 400 TABLET, FILM COATED ORAL at 17:03

## 2023-03-24 NOTE — PROGRESS NOTE ADULT - ASSESSMENT
81 y/o F Anxiety, Paroxysmal AFib on eliquis, Aortic Dissection s/p extensive covered stent placement in descending aorta , HTN, HLD, Hypothyroidism, Anxiety, Aortic Valve Replacement here after fall.     #Acute left femoral neck intertrochanteric comminuted fracture 2/2 fall  -above Noted on trauma w/u, rest of w/u with no other fractures  -ortho onboard, surgery today  - medical clearance by attending yesterday  -Cardio cleared for surgery >> moderate to high risk of MACE for intermediate risk procedure  -Bedrest for now, weight status as per ortho after surgery  -PT/rehab after surgery   -Fall precautions  -NPO, IV fluids for hydration  -Morphine prn for pain, currently controlled    #Increase in size of descending aortic aneurysm   #Hx of aortic dissection with stent placement in descending aorta  -CT abdomen: Increased in size descending thoracic aorta/abdominal aortic aneurysm measuring up to 5.3 cm in diameter (4-261) at the diaphragmatic hiatus, previously 4.7 cm.  -Discussed with daughters at length, were already notified by ED of this finding, they already discussed it with pt's family doctor. They would not want any invasive procedures and understand the risks of this finding. They do not want vascular eval at this time given pt's already poor mental and functional status.     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 In retrospect likely present since at least 2021 though much less conspicuous due to different phase of contrast. Further evaluation can be considered as clinically warranted.  -Discussed with daughters, they do not want any invasive w/u including no biopsy. I instructed them that they can have routine active screening with ultrasound every few months if they wish.    #Paroxysmal afib  -Family self stopped eliquis a month ago  -c/w multaq   -c/w metoprolol     #Hypothyroid  -c/w synthroid    DVT ppx: lovenox   Diet: npo for surgery   Code Status: DNR/DNI-discussed with daughters Ewa (harriet is [facility]), [phone]. They are ok with rescinding DNR/DNI for the sake of the surgery.   Dispo: acute    [place]) Ortho surgery today  2) PT f/u after surgery

## 2023-03-24 NOTE — PHYSICAL THERAPY INITIAL EVALUATION ADULT - SPECIFY REASON(S)
PT evaluation on hold. Patient scheduled for OR today. Will follow-up post-op.
PT evaluation on hold. Patient on bedrest. Scheduled for ORIF later today. Will follow-up post-op.

## 2023-03-24 NOTE — PRE PROCEDURE NOTE - PRE PROCEDURE EVALUATION
ORTHOPEDIC SURGERY PRE OP NOTE      Diagnosis: LEFT INTERTROCHANTERIC FRACTURE    Planned Procedure: LEFT HIP OPEN REDUCTION INTERNAL FIXATION    Consent: TO BE OBTAINED BY ATTENDING                   Risks/benefits/alternatives were discussed with the patient/family and they wish to proceed with surgery.       ANTICIPATED DATE OF PROCEDURE :   SCHEDULED CASE OR ADD-ON CASE:       Consent: TO BE OBTAINED BY ATTENDING                   Risks/benefits/alternatives were discussed with the patient/family and they wish to proceed with surgery.       Clearances:   [X] Medicine   [X] VASC  [X]CARDS    T(C): 36.4 (03-24-23 @ 00:03), Max: 36.6 (03-23-23 @ 04:52)  HR: 87 (03-24-23 @ 00:03) (70 - 87)  BP: 127/62 (03-24-23 @ 00:03) (127/62 - 141/63)  RR: 18 (03-24-23 @ 00:03) (18 - 18)  SpO2: 94% (03-24-23 @ 00:03) (92% - 95%)    Labs:                        13.6   9.83  )-----------( 185      ( 23 Mar 2023 05:45 )             42.3     03-23    138  |  99  |  16  ----------------------------<  113<H>  4.4   |  31  |  0.6<L>    Ca    8.9      23 Mar 2023 05:45    TPro  7.1  /  Alb  3.6  /  TBili  0.3  /  DBili  x   /  AST  14  /  ALT  7   /  AlkPhos  70  03-23    PT/INR - ( 23 Mar 2023 05:45 )   PT: 12.50 sec;   INR: 1.09 ratio         PTT - ( 23 Mar 2023 05:45 )  PTT:30.6 sec  Type and Screen X 2:    COVID-19 PCR: NotDetec (23 Mar 2023 06:30)    [ ]Pregnancy test ( if female of childbearing age) : N/A  [X]EKG:   [X]CXR:       DIET: NPO after midnight  IVF: per primary team      ANTICOAGULATION STATUS ( include name of anticoagulant) : LVX, CAN BE CONTINUED                         A/P: Patient is a 82y y/o Female Pending LEFT HIP OPEN REDUCTION INTERNAL FIXATION tomorrow    [ ] NPO and IVF @ midnight  [ ]pain control/analgesia prn per primary team   [ ]Incentive Spirometry   [ ]F/U Clearance  [ ]F/U Pending Labs  [ ]Notify Ortho with any questions- spectra [id number]    [ ]DISCUSSED WITH PRIMARY TEAM MEMBER (name of team member): Sara Banks  [ ]Date and Time DISCUSSED WITH PRIMARY TEAM MEMBER: 3/24/2023, 1:39am ORTHOPEDIC SURGERY PRE OP NOTE      Diagnosis: LEFT INTERTROCHANTERIC FRACTURE    Planned Procedure: LEFT HIP OPEN REDUCTION INTERNAL FIXATION    Consent: TO BE OBTAINED BY ATTENDING                   Risks/benefits/alternatives were discussed with the patient/family and they wish to proceed with surgery.       ANTICIPATED DATE OF PROCEDURE : 3/24/2023  SCHEDULED CASE OR ADD-ON CASE: ADD-ON      Consent: TO BE OBTAINED BY ATTENDING                   Risks/benefits/alternatives were discussed with the patient/family and they wish to proceed with surgery.       Clearances:   [X] Medicine   [X] VASC  [X]CARDS    T(C): 36.4 (03-24-23 @ 00:03), Max: 36.6 (03-23-23 @ 04:52)  HR: 87 (03-24-23 @ 00:03) (70 - 87)  BP: 127/62 (03-24-23 @ 00:03) (127/62 - 141/63)  RR: 18 (03-24-23 @ 00:03) (18 - 18)  SpO2: 94% (03-24-23 @ 00:03) (92% - 95%)    Labs:                        13.6   9.83  )-----------( 185      ( 23 Mar 2023 05:45 )             42.3     03-23    138  |  99  |  16  ----------------------------<  113<H>  4.4   |  31  |  0.6<L>    Ca    8.9      23 Mar 2023 05:45    TPro  7.1  /  Alb  3.6  /  TBili  0.3  /  DBili  x   /  AST  14  /  ALT  7   /  AlkPhos  70  03-23    PT/INR - ( 23 Mar 2023 05:45 )   PT: 12.50 sec;   INR: 1.09 ratio         PTT - ( 23 Mar 2023 05:45 )  PTT:30.6 sec  Type and Screen X 2:    COVID-19 PCR: NotDetec (23 Mar 2023 06:30)    [ ]Pregnancy test ( if female of childbearing age) : N/A  [X]EKG:   [X]CXR:       DIET: NPO after midnight  IVF: per primary team      ANTICOAGULATION STATUS ( include name of anticoagulant) : LVX, CAN BE CONTINUED                         A/P: Patient is a 82y y/o Female Pending LEFT HIP OPEN REDUCTION INTERNAL FIXATION tomorrow    [ ] NPO and IVF @ midnight  [ ]pain control/analgesia prn per primary team   [ ]Incentive Spirometry   [ ]F/U Clearance  [ ]F/U Pending Labs  [ ]Notify Ortho with any questions- spectra [id number]    [ ]DISCUSSED WITH PRIMARY TEAM MEMBER (name of team member): Sara Banks  [ ]Date and Time DISCUSSED WITH PRIMARY TEAM MEMBER: 3/24/2023, 1:39am

## 2023-03-24 NOTE — CHART NOTE - NSCHARTNOTEFT_GEN_A_CORE
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 Pt demented at baseline and does not remember what happened. As per family (daughter via phone call) pt had unwitnessed fall from bed. C/o of LT hip pain, otherwise no other complaints. Mental state at baseline AAO1-2, currently at baseline. Denies LOC, head trauma, nausea, vomiting, abd pain, sob, difficulty breathing.    In the ED, trauma w/u as below, significant for acute left intertrochanteric fx, ortho onboard, planned for surgery later today.Pt given 2mg morphine in ED.  Vitals: Vital Signs Last 24 Hrs  T(C): 36.6 (23 Mar 2023 04:52), Max: 36.6 (23 Mar 2023 04:52)  T(F): 97.9 (23 Mar 2023 04:52), Max: 97.9 (23 Mar 2023 04:52)  HR: 83 (23 Mar 2023 04:52) (83 - 83)  BP: 137/84 (23 Mar 2023 04:52) (137/84 - 137/84)  BP(mean): --  RR: 18 (23 Mar 2023 04:52) (18 - 18)  SpO2: 93% (23 Mar 2023 04:52) (93% - 93%)    Parameters below as of 23 Mar 2023 04:52  Patient On (Oxygen Delivery Method): room air    Labs: unremarkable    Imaging:  < from: CT Abdomen and Pelvis w/ IV Cont (03.23.23 @ 06:41) >  IMPRESSION:    Acute left femoral neck intertrochanteric comminuted fracture.    Increased in size descending thoracic aorta/abdominal aortic aneurysm   measuring up to 5.3 cm in diameter (4-261) at the diaphragmatic hiatus,   previously 4.7 cm.    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 In retrospect likely present since at   least July 2021 though much less conspicuous due to different phase of   contrast. Further evaluation can be considered as clinically warranted.    < end of copied text >  < from: CT Cervical Spine No Cont (03.23.23 @ 06:31) >  IMPRESSION:    No evidence of acute traumatic cervical spine injury.    < end of copied text >  < from: CT Head No Cont (03.23.23 @ 06:16) >  IMPRESSION:  No evidence of acute intracranial pathology.    < end of copied text >  < from: Xray Pelvis AP only (03.23.23 @ 05:53) >  IMPRESSION:    Bones are diffusely osteopenic.    Acute minimally displaced left femoral intertrochanteric fracture with   avulsion of the lesser trochanter. No dislocation.    Chronic right intertrochanteric fracture status post intramedullary gamma   nail.    Bilateral hip osteoarthritis. Lumbosacral spine degenerative changes    < end of copied text >  < from: Xray Chest 1 View AP/PA (03.23.23 @ 05:53) >  Impression:    Bibasilar lung atelectasis, left greater than right    < end of copied text >   (23 Mar 2023 10:23)    Patient seen and examined.     PHYSICAL EXAM:  General: NAD, AAOx3, calm and cooperative, appearing frail  HEENT: NCAT  Cardiac: No peripheral cyanosis or pallor, extremities well perfused   Respiratory: Non-labored breathing, equal chest rise bilaterally   Abdomen: Soft, non-distended, non-tender, no rebound, no guarding  Musculoskeletal: LLE ROM limited 2/2 pain, mild left femural tenderness to palpation   Neuro: Sensation grossly intact and equal throughout, no focal deficits  Vascular: Pulses 2+ throughout, extremities well perfused  Skin: Warm/dry, normal color, no jaundice    Vital Signs Last 24 Hrs  T(C): 36.1 (24 Mar 2023 08:42), Max: 36.4 (24 Mar 2023 00:03)  T(F): 97 (24 Mar 2023 08:42), Max: 97.6 (24 Mar 2023 00:03)  HR: 78 (24 Mar 2023 08:42) (70 - 87)  BP: 146/65 (24 Mar 2023 08:42) (127/62 - 146/65)  BP(mean): --  RR: 18 (24 Mar 2023 08:42) (18 - 18)  SpO2: 94% (24 Mar 2023 00:03) (92% - 95%)    Parameters below as of 24 Mar 2023 00:03  Patient On (Oxygen Delivery Method): room air      Labs:  CAPILLARY BLOOD GLUCOSE                        11.2   9.93  )-----------( 184      ( 24 Mar 2023 08:18 )             35.3       Auto Neutrophil %: 82.4 % (03-24-23 @ 08:18)  Auto Immature Granulocyte %: 0.5 % (03-24-23 @ 08:18)    03-24    140  |  102  |  21<H>  ----------------------------<  99  4.2   |  28  |  0.6<L>    Calcium, Total Serum: 8.7 mg/dL (03-24-23 @ 08:18)    LFTs:             6.3  | 0.4  | 9        ------------------[63      ( 24 Mar 2023 08:18 )  3.4  | x    | 6           Lipase:x      Amylase:x        Lactate, Blood: 1.1 mmol/L (03-23-23 @ 05:45)      Coags:     12.50  ----< 1.09    ( 23 Mar 2023 05:45 )     30.6        CARDIAC MARKERS ( 23 Mar 2023 05:45 )  x     / <0.01 ng/mL / x     / x     / x        CAGE SUBSTANCE ABUSE SCREENING TOOL:  1.	Have you ever felt you should cut down on your drinking?   [  ] YES = 1      [ x ] NO = 0  2.	Have people annoyed you by criticizing your drinking?    [  ] YES = 1      [ x ] NO = 0  3.	Have you ever felt bad or guilty about your drinking?   [  ] YES = 1      [ x ] NO = 0  4.	Have you ever had a drink first thing in the morning to steady your nerves or to get rid of a hangover (eye-opener)?    [  ] YES = 1      [ x ] NO = 0    Total =     [  ] Score is two or greater which is considered clinically significant, social work consult will be placed.   [ x ] Score is not two or greater which is not considered clinically significant, social work consult not warranted at this time.    RADIOLOGICAL FINDINGS REVIEW:  CXR: < from: Xray Chest 1 View AP/PA (03.23.23 @ 05:53) >    Impression:    Bibasilar lung atelectasis, left greater than right        --- End of Report ---    < end of copied text >        Pelvis Films: < from: Xray Pelvis AP only (03.23.23 @ 05:53) >    IMPRESSION:    Bones are diffusely osteopenic.    Acute minimally displaced left femoral intertrochanteric fracture with   avulsion of the lesser trochanter. No dislocation.    Chronic right intertrochanteric fracture status post intramedullary gamma   nail.    Bilateral hip osteoarthritis. Lumbosacral spine degenerative changes    --- End of Report ---    < end of copied text >    Head CT: < from: CT Head No Cont (03.23.23 @ 06:16) >    IMPRESSION:  No evidence of acute intracranial pathology.    --- End of Report ---    < end of copied text >    C-Spine CT: < from: CT Cervical Spine No Cont (03.23.23 @ 06:31) >    IMPRESSION:    No evidence of acute traumatic cervical spine injury.    --- End of Report ---    < end of copied text >    Chest CT: < from: CT Chest w/ IV Cont (03.23.23 @ 06:41) >    IMPRESSION:    Acute left femoral neck intertrochanteric comminuted fracture.    Increased in size descending thoracic aorta/abdominal aortic aneurysm   measuring up to 5.3 cm in diameter (4-261) at the diaphragmatic hiatus,   previously 4.7 cm.    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 In retrospect likely present since at   least July 2021 though much less conspicuous due to different phase of   contrast. Further evaluation can be considered as clinically warranted.    --- End of Report ---    < end of copied text >    ABD/Pelvis CT: < from: CT Abdomen and Pelvis w/ IV Cont (03.23.23 @ 06:41) >    IMPRESSION:    Acute left femoral neck intertrochanteric comminuted fracture.    Increased in size descending thoracic aorta/abdominal aortic aneurysm   measuring up to 5.3 cm in diameter (4-261) at the diaphragmatic hiatus,   previously 4.7 cm.    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 In retrospect likely present since at   least July 2021 though much less conspicuous due to different phase of   contrast. Further evaluation can be considered as clinically warranted.    --- End of Report ---    < end of copied text >    Other:    ASSESSMENT/ PLAN:   82yF w/ PMHx as above seen as TRAUMA ALERT s/p fall with acute left femoral neck intertrochanteric comminuted fracture. Trauma assessment in ED: ABCs intact , GCS 15 , AAOx3 , with physical exam findings, imaging, and labs as documented above, injuries are identified:     - Pending OR today with orthopedics   - All images/reports reviewed. No further traumatic work-up warranted  - Trauma Surgery signing off, please reconsult [phone] for questions or concerns

## 2023-03-24 NOTE — PROGRESS NOTE ADULT - ASSESSMENT
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 Pt demented at baseline and does not remember what happened. As per family (daughter via phone call) pt had unwitnessed fall from bed. C/o of LT hip pain, otherwise no other complaints. Mental state at baseline AAO1-2, currently at baseline. Denies LOC, head trauma, nausea, vomiting, abd pain, sob, difficulty breathing.    #Mechanical fall complicated by Acute minimally displaced left femoral intertrochanteric fracture with  avulsion of the lesser trochanter.  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

## 2023-03-24 NOTE — BRIEF OPERATIVE NOTE - NSICDXBRIEFPROCEDURE_GEN_ALL_CORE_FT
PROCEDURES:  Open reduction and internal fixation of left hip using trochanteric richie 24-Mar-2023 19:42:17  Teodoro Mares

## 2023-03-24 NOTE — PRE-OP CHECKLIST - IDENTIFICATION BAND VERIFIED

## 2023-03-25 LAB
ANION GAP SERPL CALC-SCNC: 14 MMOL/L — SIGNIFICANT CHANGE UP (ref 7–14)
BASOPHILS # BLD AUTO: 0.04 K/UL — SIGNIFICANT CHANGE UP (ref 0–0.2)
BASOPHILS NFR BLD AUTO: 0.3 % — SIGNIFICANT CHANGE UP (ref 0–1)
BLD GP AB SCN SERPL QL: SIGNIFICANT CHANGE UP
BUN SERPL-MCNC: 24 MG/DL — HIGH (ref 10–20)
CALCIUM SERPL-MCNC: 8.5 MG/DL — SIGNIFICANT CHANGE UP (ref 8.4–10.4)
CHLORIDE SERPL-SCNC: 100 MMOL/L — SIGNIFICANT CHANGE UP (ref 98–110)
CO2 SERPL-SCNC: 27 MMOL/L — SIGNIFICANT CHANGE UP (ref 17–32)
CREAT SERPL-MCNC: 0.7 MG/DL — SIGNIFICANT CHANGE UP (ref 0.7–1.5)
EGFR: 87 ML/MIN/1.73M2 — SIGNIFICANT CHANGE UP
EOSINOPHIL # BLD AUTO: 0.03 K/UL — SIGNIFICANT CHANGE UP (ref 0–0.7)
EOSINOPHIL NFR BLD AUTO: 0.2 % — SIGNIFICANT CHANGE UP (ref 0–8)
GLUCOSE SERPL-MCNC: 123 MG/DL — HIGH (ref 70–99)
HCT VFR BLD CALC: 32.1 % — LOW (ref 37–47)
HGB BLD-MCNC: 10.1 G/DL — LOW (ref 12–16)
IMM GRANULOCYTES NFR BLD AUTO: 0.7 % — HIGH (ref 0.1–0.3)
LYMPHOCYTES # BLD AUTO: 0.42 K/UL — LOW (ref 1.2–3.4)
LYMPHOCYTES # BLD AUTO: 3.2 % — LOW (ref 20.5–51.1)
MAGNESIUM SERPL-MCNC: 1.9 MG/DL — SIGNIFICANT CHANGE UP (ref 1.8–2.4)
MCHC RBC-ENTMCNC: 30.1 PG — SIGNIFICANT CHANGE UP (ref 27–31)
MCHC RBC-ENTMCNC: 31.5 G/DL — LOW (ref 32–37)
MCV RBC AUTO: 95.5 FL — SIGNIFICANT CHANGE UP (ref 81–99)
MONOCYTES # BLD AUTO: 0.88 K/UL — HIGH (ref 0.1–0.6)
MONOCYTES NFR BLD AUTO: 6.6 % — SIGNIFICANT CHANGE UP (ref 1.7–9.3)
NEUTROPHILS # BLD AUTO: 11.82 K/UL — HIGH (ref 1.4–6.5)
NEUTROPHILS NFR BLD AUTO: 89 % — HIGH (ref 42.2–75.2)
NRBC # BLD: 0 /100 WBCS — SIGNIFICANT CHANGE UP (ref 0–0)
PLATELET # BLD AUTO: 183 K/UL — SIGNIFICANT CHANGE UP (ref 130–400)
POTASSIUM SERPL-MCNC: 4.3 MMOL/L — SIGNIFICANT CHANGE UP (ref 3.5–5)
POTASSIUM SERPL-SCNC: 4.3 MMOL/L — SIGNIFICANT CHANGE UP (ref 3.5–5)
RBC # BLD: 3.36 M/UL — LOW (ref 4.2–5.4)
RBC # FLD: 13.2 % — SIGNIFICANT CHANGE UP (ref 11.5–14.5)
SODIUM SERPL-SCNC: 141 MMOL/L — SIGNIFICANT CHANGE UP (ref 135–146)
WBC # BLD: 13.28 K/UL — HIGH (ref 4.8–10.8)
WBC # FLD AUTO: 13.28 K/UL — HIGH (ref 4.8–10.8)

## 2023-03-25 PROCEDURE — 99233 SBSQ HOSP IP/OBS HIGH 50: CPT

## 2023-03-25 RX ADMIN — DRONEDARONE 400 MILLIGRAM(S): 400 TABLET, FILM COATED ORAL at 11:15

## 2023-03-25 RX ADMIN — Medication 100 MILLIGRAM(S): at 10:45

## 2023-03-25 RX ADMIN — SODIUM CHLORIDE 80 MILLILITER(S): 9 INJECTION, SOLUTION INTRAVENOUS at 12:45

## 2023-03-25 RX ADMIN — ENOXAPARIN SODIUM 40 MILLIGRAM(S): 100 INJECTION SUBCUTANEOUS at 11:43

## 2023-03-25 RX ADMIN — SODIUM CHLORIDE 80 MILLILITER(S): 9 INJECTION, SOLUTION INTRAVENOUS at 11:23

## 2023-03-25 RX ADMIN — Medication 50 MICROGRAM(S): at 11:17

## 2023-03-25 RX ADMIN — Medication 50 MILLIGRAM(S): at 11:18

## 2023-03-25 RX ADMIN — Medication 100 MILLIGRAM(S): at 15:04

## 2023-03-25 NOTE — CONSULT NOTE ADULT - ASSESSMENT
IMPRESSION: Rehabilitation for    PRECAUTIONS: [  ] Cardiac  [  ] Respiratory  [  ] Seizures [  ] Contact Precautions  [  ] Droplet Isolation  [  ] Other:      Weight Bearing Status:  WBAT    RECOMMENDATION:    Out of bed to chair     DVT/decubitus ulcer prophylaxis    REHABILITATION PLAN:     [  X ] Bedside PT 3-5 times a week   [   ]   Bedside OT  2-3 times a week             [   ] No Rehab Therapy Indicated                   [   ]  Speech Therapy   Conditioning/ROM                                    ADL  Bed Mobility                                               Conditioning/ROM  Transfers                                                     Bed Mobility  Sitting /Standing Balance                         Transfers                                        Gait Training                                               Sitting/Standing Balance  Stair Training  [  X ] Applicable                    Home Equipment Evaluation                                                                        Splinting  [   ] Only      GOALS:   ADL   [   ]   Independent                    Transfers  [ X  ] Independent                          Ambulation  [ X  ] Independent     [ X   ] With device                            [   ]  CG                                                         [   ]  CG                                                                  [   ] CG                            [    ] Min A                                                   [   ] Min A                                                              [   ] Min  A          DISCHARGE PLAN:  [    ]  Good candidate for Intensive Rehabilitation/Hospital-based 4A [facility]                                             Will tolerate 3 hours of Intensive Rehab Daily                                       [  X  ]  Short Term Rehabilitation in Skilled Nursing Facility                                       [    ]  Home with Outpatient or VN services                                         [    ]  Possible Candidate for Intensive Hospital-Based Rehabilitation      Thank you. IMPRESSION: Rehabilitation for left hip intertrochanteric fx/ORIF with gamma nail    PRECAUTIONS: [  ] Cardiac  [  ] Respiratory  [  ] Seizures [  ] Contact Precautions  [  ] Droplet Isolation  [  ] Other:      Weight Bearing Status:  WBAT LLE    RECOMMENDATION:    Out of bed to chair     DVT/decubitus ulcer prophylaxis    REHABILITATION PLAN:     [  X ] Bedside PT 3-5 times a week   [   ]   Bedside OT  2-3 times a week             [   ] No Rehab Therapy Indicated                   [   ]  Speech Therapy   Conditioning/ROM                                    ADL  Bed Mobility                                               Conditioning/ROM  Transfers                                                     Bed Mobility  Sitting /Standing Balance                         Transfers                                        Gait Training                                               Sitting/Standing Balance  Stair Training  [  X ] Applicable                    Home Equipment Evaluation                                                                        Splinting  [   ] Only      GOALS:   ADL   [   ]   Independent                    Transfers  [   ] Independent                          Ambulation  [   ] Independent     [ X   ] With device                            [   ]  CG                                                         [   ]  CG                                                                  [   ] CG                            [    ] Min A                                                   [ X  ] Min A                                                              [ X  ] Min  A          DISCHARGE PLAN:  [    ]  Good candidate for Intensive Rehabilitation/Hospital-based 4A [facility]                                             Will tolerate 3 hours of Intensive Rehab Daily                                       [  X  ]  Short Term Rehabilitation in Skilled Nursing Facility                                       [    ]  Home with Outpatient or VN services                                         [    ]  Possible Candidate for Intensive Hospital-Based Rehabilitation      Thank you.

## 2023-03-25 NOTE — PROGRESS NOTE ADULT - ASSESSMENT
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 Pt demented at baseline and does not remember what happened. As per family (daughter via phone call) pt had unwitnessed fall from bed. C/o of LT hip pain, otherwise no other complaints. Mental state at baseline AAO1-2, currently at baseline. Denies LOC, head trauma, nausea, vomiting, abd pain, sob, difficulty breathing.    #Mechanical fall complicated by Acute minimally displaced left femoral intertrochanteric fracture with  avulsion of the lesser trochanter.  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

## 2023-03-25 NOTE — PHYSICAL THERAPY INITIAL EVALUATION ADULT - PERTINENT HX OF CURRENT PROBLEM, REHAB EVAL
81 yo f with Anxiety, Paroxysmal AFib on eliquis, Aortic Dissection s/p extensive covered stent placement in descending aorta , HTN, HLD, Hypothyroidism, Aortic Valve Replacement here after unwitnessed fall. Pt demented at baseline and does not remember what happened. As per family (daughter via phone call) pt had unwitnessed fall from bed. C/o of LT hip pain, otherwise no other complaints. Mental state at baseline AAO1-2, currently at baseline. Denies LOC, head trauma, nausea, vomiting, abd pain, sob, difficulty breathing.

## 2023-03-25 NOTE — CONSULT NOTE ADULT - SUBJECTIVE AND OBJECTIVE BOX
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 Pt demented at baseline and does not remember what happened. As per family (daughter via phone call) pt had unwitnessed fall from bed. C/o of LT hip pain, otherwise no other complaints. Mental state at baseline AAO1-2, currently at baseline. Denies LOC, head trauma, nausea, vomiting, abd pain, sob, difficulty breathing.    In the ED, trauma w/u as below, significant for acute left intertrochanteric fx, ortho onboard, planned for surgery later today.Pt given 2mg morphine in ED.  Vitals: Vital Signs Last 24 Hrs  T(C): 36.6 (23 Mar 2023 04:52), Max: 36.6 (23 Mar 2023 04:52)  T(F): 97.9 (23 Mar 2023 04:52), Max: 97.9 (23 Mar 2023 04:52)  HR: 83 (23 Mar 2023 04:52) (83 - 83)  BP: 137/84 (23 Mar 2023 04:52) (137/84 - 137/84)  BP(mean): --  RR: 18 (23 Mar 2023 04:52) (18 - 18)  SpO2: 93% (23 Mar 2023 04:52) (93% - 93%)    Parameters below as of 23 Mar 2023 04:52  Patient On (Oxygen Delivery Method): room air    Labs: unremarkable    Imaging:  < from: CT Abdomen and Pelvis w/ IV Cont (03.23.23 @ 06:41) >  IMPRESSION:    Acute left femoral neck intertrochanteric comminuted fracture.    Increased in size descending thoracic aorta/abdominal aortic aneurysm   measuring up to 5.3 cm in diameter (4-261) at the diaphragmatic hiatus,   previously 4.7 cm.    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 In retrospect likely present since at   least July 2021 though much less conspicuous due to different phase of   contrast. Further evaluation can be considered as clinically warranted.    < end of copied text >  < from: CT Cervical Spine No Cont (03.23.23 @ 06:31) >  IMPRESSION:    No evidence of acute traumatic cervical spine injury.    < end of copied text >  < from: CT Head No Cont (03.23.23 @ 06:16) >  IMPRESSION:  No evidence of acute intracranial pathology.    < end of copied text >  < from: Xray Pelvis AP only (03.23.23 @ 05:53) >  IMPRESSION:    Bones are diffusely osteopenic.    Acute minimally displaced left femoral intertrochanteric fracture with   avulsion of the lesser trochanter. No dislocation.    Chronic right intertrochanteric fracture status post intramedullary gamma   nail.    Bilateral hip osteoarthritis. Lumbosacral spine degenerative changes    < end of copied text >  < from: Xray Chest 1 View AP/PA (03.23.23 @ 05:53) >  Impression:    Bibasilar lung atelectasis, left greater than right    < end of copied text >   (23 Mar 2023 10:23)      PAST MEDICAL & SURGICAL HISTORY:  Hypertension      Hyperlipidemia      Hypothyroidism      No significant past surgical history          HOSPITAL COURSE:    TODAY'S SUBJECTIVE & REVIEW OF SYMPTOMS:    Constitutional WNL  Cardiac WNL   Respiratory WNL  GI WNL   WNL  Endocrine WNL  Skin WNL  Musculoskeletal WNL  Neuro WNL  Cognitive WNL  Psych WNL      MEDICATIONS  (STANDING):  ceFAZolin   IVPB 2000 milliGRAM(s) IV Intermittent every 8 hours  dronedarone 400 milliGRAM(s) Oral two times a day  enoxaparin Injectable 40 milliGRAM(s) SubCutaneous every 24 hours  lactated ringers. 1000 milliLiter(s) (80 mL/Hr) IV Continuous <Continuous>  lactated ringers. 1000 milliLiter(s) (75 mL/Hr) IV Continuous <Continuous>  levothyroxine 50 MICROGram(s) Oral daily  metoprolol succinate ER 50 milliGRAM(s) Oral daily    MEDICATIONS  (PRN):  acetaminophen   IVPB .. 650 milliGRAM(s) IV Intermittent once PRN Moderate Pain (4 - 6)  HYDROmorphone  Injectable 0.5 milliGRAM(s) IV Push every 10 minutes PRN Severe Pain (7 - 10)  morphine  - Injectable 4 milliGRAM(s) IV Push every 10 minutes PRN Severe Pain (7 - 10)  morphine  - Injectable 2 milliGRAM(s) IV Push every 6 hours PRN Severe Pain (7 - 10)  ondansetron Injectable 4 milliGRAM(s) IV Push once PRN Nausea and/or Vomiting  oxyCODONE    IR 10 milliGRAM(s) Oral every 6 hours PRN Moderate Pain (4 - 6)      FAMILY HISTORY:      Allergies    No Known Allergies    Intolerances        SOCIAL HISTORY:    [  ] Smoking  [  ] EtOH  [  ] Substance abuse     Home Environment:  [  ] Alone  [  ] Lives with Family  [  ] Home Health Aide    Dwelling:  [  ] Private House  [  ] Apartment  [  ] Adult Home/Assisted Living Facility  [  ] Skilled Nursing Facility      [  ] Short Term  [  ] Long Term  [  ] Stairs       Elevator [  ]    FUNCTIONAL STATUS PTA: (Check all that apply)  Ambulation: [   ]Independent    [  ] Dependent     [  ] Non-Ambulatory  Assistive Device: [  ] Straight Cane  [  ]  Quad Cane  [  ] Walker  [  ]  Wheelchair  ADL: [  ] Independent  [  ]  Dependent       Vital Signs Last 24 Hrs  T(C): 36.3 (24 Mar 2023 19:30), Max: 36.9 (24 Mar 2023 17:48)  T(F): 97.3 (24 Mar 2023 19:30), Max: 98.4 (24 Mar 2023 17:48)  HR: 80 (24 Mar 2023 20:15) (75 - 84)  BP: 155/71 (24 Mar 2023 20:15) (139/63 - 168/77)  BP(mean): --  RR: 16 (24 Mar 2023 20:15) (16 - 18)  SpO2: 97% (24 Mar 2023 20:15) (93% - 98%)    Parameters below as of 24 Mar 2023 20:15  Patient On (Oxygen Delivery Method): room air          PHYSICAL EXAM: Alert and oriented X 4  GENERAL: Well-developed, well-nourished, in NAD  HEAD:  Normocephalic, atraumatic  EYES: EOMI, PERRLA, sclerae anicteric, conjunctivae not injected  NECK: Supple, No JVD, Normal thyroid  CHEST/LUNG: Clear to auscultation bilaterally; No rales, rhonchi, wheezing  HEART: Regular rate and rhythm; No murmurs, gallops, or rubs  ABDOMEN: Soft, nontender, nondistended; Bowel sounds present  EXTREMITIES:  2+ Peripheral pulses; No clubbing, cyanosis, or edema    NERVOUS SYSTEM:  Cranial Nerves II-XII intact [  ] Abnormal  [  ]  ROM: WFL all extremities [  ]  Abnormal [  ]  Motor Strength: WFL all extremities  [  ]  Abnormal [  ]  Sensation: intact to light touch [  ] Abnormal [  ]  Reflexes: Symmetric [  ]  Abnormal [  ]    FUNCTIONAL STATUS:  Bed Mobility: Independent [  ]  Supervision [  ]  Needs Assistance [  ]  N/A [  ]  Transfers: Independent [  ]  Supervision [  ]  Needs Assistance [  ]  N/A [  ]   Ambulation: Independent [  ]  Supervision [  ]  Needs Assistance [  ]  N/A [  ]  ADLs: Independent [  ] Requires Assistance [  ] N/A [  ]      LABS:                        10.1   13.28 )-----------( 183      ( 25 Mar 2023 06:43 )             32.1     03-25    141  |  100  |  24<H>  ----------------------------<  123<H>  4.3   |  27  |  0.7    Ca    8.5      25 Mar 2023 06:43  Mg     1.9     03-25    TPro  6.3  /  Alb  3.4<L>  /  TBili  0.4  /  DBili  x   /  AST  9   /  ALT  6   /  AlkPhos  63  03-24          RADIOLOGY & ADDITIONAL STUDIES:     HPI:  82 y.o. right-handed f with Anxiety, Paroxysmal AFib on eliquis, Aortic Dissection s/p extensive covered stent placement in descending aorta , HTN, HLD, Hypothyroidism, Aortic Valve Replacement here after unwitnessed fall. Pt demented at baseline and does not remember what happened. As per family (daughter via phone call) pt had unwitnessed fall from bed. C/o of LT hip pain, otherwise no other complaints. Mental state at baseline AAO1-2, currently at baseline. Denies LOC, head trauma, nausea, vomiting, abd pain, sob, difficulty breathing.    In the ED, trauma w/u as below, significant for acute left intertrochanteric fx, ortho onboard, planned for surgery later today.Pt given 2mg morphine in ED.  Vitals: Vital Signs Last 24 Hrs  T(C): 36.6 (23 Mar 2023 04:52), Max: 36.6 (23 Mar 2023 04:52)  T(F): 97.9 (23 Mar 2023 04:52), Max: 97.9 (23 Mar 2023 04:52)  HR: 83 (23 Mar 2023 04:52) (83 - 83)  BP: 137/84 (23 Mar 2023 04:52) (137/84 - 137/84)  BP(mean): --  RR: 18 (23 Mar 2023 04:52) (18 - 18)  SpO2: 93% (23 Mar 2023 04:52) (93% - 93%)    Parameters below as of 23 Mar 2023 04:52  Patient On (Oxygen Delivery Method): room air    Labs: unremarkable    Imaging:  < from: CT Abdomen and Pelvis w/ IV Cont (03.23.23 @ 06:41) >  IMPRESSION:    Acute left femoral neck intertrochanteric comminuted fracture.    Increased in size descending thoracic aorta/abdominal aortic aneurysm   measuring up to 5.3 cm in diameter (4-261) at the diaphragmatic hiatus,   previously 4.7 cm.    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 In retrospect likely present since at   least July 2021 though much less conspicuous due to different phase of   contrast. Further evaluation can be considered as clinically warranted.    < end of copied text >  < from: CT Cervical Spine No Cont (03.23.23 @ 06:31) >  IMPRESSION:    No evidence of acute traumatic cervical spine injury.    < end of copied text >  < from: CT Head No Cont (03.23.23 @ 06:16) >  IMPRESSION:  No evidence of acute intracranial pathology.    < end of copied text >  < from: Xray Pelvis AP only (03.23.23 @ 05:53) >  IMPRESSION:    Bones are diffusely osteopenic.    Acute minimally displaced left femoral intertrochanteric fracture with   avulsion of the lesser trochanter. No dislocation.    Chronic right intertrochanteric fracture status post intramedullary gamma   nail.    Bilateral hip osteoarthritis. Lumbosacral spine degenerative changes    < end of copied text >  < from: Xray Chest 1 View AP/PA (03.23.23 @ 05:53) >  Impression:    Bibasilar lung atelectasis, left greater than right    < end of copied text >   (23 Mar 2023 10:23)      PAST MEDICAL & SURGICAL HISTORY:  Hypertension      Hyperlipidemia      Hypothyroidism      No significant past surgical history          HOSPITAL COURSE:  Pre-Op Diagnosis, Post-Op Diagnosis and Procedure:   Pre-Op, Post-Op and Procedure Selector:  ·  PRE-OP DIAGNOSIS:  Intertrochanteric fracture of left hip 24-Mar-2023 19:42:35  Teodoro Mares.  ·  POST-OP DIAGNOSIS:  Intertrochanteric fracture of left hip 24-Mar-2023 19:43:00  Teodoro Mares.  ·  PROCEDURES:  Open reduction and internal fixation of left hip using trochanteric richie 24-Mar-2023 19:42:17  Teodoro Mares.      Operative Findings:  · Operative Findings	Displaced basicervical IT fx      Seen by Physical Therapy today.    TODAY'S SUBJECTIVE & REVIEW OF SYMPTOMS:    Constitutional WNL  Cardiac WNL   Respiratory WNL  GI WNL   WNL  Endocrine WNL  Skin WNL  Musculoskeletal WNL  Neuro WNL  Cognitive --does not remember having hip surgery  Psych WNL      MEDICATIONS  (STANDING):  ceFAZolin   IVPB 2000 milliGRAM(s) IV Intermittent every 8 hours  dronedarone 400 milliGRAM(s) Oral two times a day  enoxaparin Injectable 40 milliGRAM(s) SubCutaneous every 24 hours  lactated ringers. 1000 milliLiter(s) (80 mL/Hr) IV Continuous <Continuous>  lactated ringers. 1000 milliLiter(s) (75 mL/Hr) IV Continuous <Continuous>  levothyroxine 50 MICROGram(s) Oral daily  metoprolol succinate ER 50 milliGRAM(s) Oral daily    MEDICATIONS  (PRN):  acetaminophen   IVPB .. 650 milliGRAM(s) IV Intermittent once PRN Moderate Pain (4 - 6)  HYDROmorphone  Injectable 0.5 milliGRAM(s) IV Push every 10 minutes PRN Severe Pain (7 - 10)  morphine  - Injectable 4 milliGRAM(s) IV Push every 10 minutes PRN Severe Pain (7 - 10)  morphine  - Injectable 2 milliGRAM(s) IV Push every 6 hours PRN Severe Pain (7 - 10)  ondansetron Injectable 4 milliGRAM(s) IV Push once PRN Nausea and/or Vomiting  oxyCODONE    IR 10 milliGRAM(s) Oral every 6 hours PRN Moderate Pain (4 - 6)      FAMILY HISTORY:      Allergies    No Known Allergies    Intolerances        SOCIAL HISTORY:    [  ] Smoking  [  ] EtOH  [  ] Substance abuse     Home Environment:  [  ] Alone  [ X ] Lives with Family--  [  ] Home Health Aide    Dwelling:  [ X ] Private House  [  ] Apartment  [  ] Adult Home/Assisted Living Facility  [  ] Skilled Nursing Facility      [  ] Short Term  [  ] Long Term  [ X ] Stairs + steps to enter      Elevator [  ]    FUNCTIONAL STATUS PTA: (Check all that apply)  Ambulation: [   ]Independent    [ X ] Required assist     [  ] Non-Ambulatory  Assistive Device: [  ] Straight Cane  [  ]  Quad Cane  [  ] Walker  [  ]  Wheelchair  ADL: [  ] Independent  [ X ]  Required assist, HHA      Vital Signs Last 24 Hrs  T(C): 36.3 (24 Mar 2023 19:30), Max: 36.9 (24 Mar 2023 17:48)  T(F): 97.3 (24 Mar 2023 19:30), Max: 98.4 (24 Mar 2023 17:48)  HR: 80 (24 Mar 2023 20:15) (75 - 84)  BP: 155/71 (24 Mar 2023 20:15) (139/63 - 168/77)  BP(mean): --  RR: 16 (24 Mar 2023 20:15) (16 - 18)  SpO2: 97% (24 Mar 2023 20:15) (93% - 98%)    Parameters below as of 24 Mar 2023 20:15  Patient On (Oxygen Delivery Method): room air          PHYSICAL EXAM: Alert and oriented X 1  GENERAL: Well-developed, well-nourished, in NAD  HEAD:  Normocephalic, atraumatic  EYES: EOMI, PERRLA, sclerae anicteric, conjunctivae not injected  NECK: Supple, No JVD, Normal thyroid  CHEST/LUNG: Clear to auscultation bilaterally; No rales, rhonchi, wheezing  HEART: Regular rate and rhythm; No murmurs, gallops, or rubs  ABDOMEN: Soft, nontender, nondistended; Bowel sounds present  EXTREMITIES:  + hands with intrinsic muscle atrophy, + left lateral thigh wound dressing    NERVOUS SYSTEM:  Cranial Nerves II-XII intact [ X ] Abnormal  [  ]  ROM: WFL all extremities [  ]  Abnormal [X  ]  Motor Strength: WFL all extremities  [  ]  Abnormal [X  ]  Sensation: intact to light touch [  ] Abnormal [  ]  Reflexes: Symmetric [  ]  Abnormal [  ]    FUNCTIONAL STATUS:  Bed Mobility: Independent [  ]  Supervision [  ]  Needs Assistance [ X ]  N/A [  ]  Transfers: Independent [  ]  Supervision [  ]  Needs Assistance [ X ]  N/A [  ]   Ambulation: Independent [  ]  Supervision [  ]  Needs Assistance [X  ]  N/A [  ]  ADLs: Independent [  ] Requires Assistance [ X ] N/A [  ]      LABS:                        10.1   13.28 )-----------( 183      ( 25 Mar 2023 06:43 )             32.1     03-25    141  |  100  |  24<H>  ----------------------------<  123<H>  4.3   |  27  |  0.7    Ca    8.5      25 Mar 2023 06:43  Mg     1.9     03-25    TPro  6.3  /  Alb  3.4<L>  /  TBili  0.4  /  DBili  x   /  AST  9   /  ALT  6   /  AlkPhos  63  03-24          RADIOLOGY & ADDITIONAL STUDIES:  < from: Xray Knee 3 Views, Left (03.23.23 @ 06:44) >  ACC: [id number] EXAM:  XR KNEE W PATELLA 3 VIEWS LT   ORDERED BY: LAUREL SHELTON     PROCEDURE DATE:  03/23/2023          INTERPRETATION:  CLINICAL INDICATION:fall    TECHNIQUE: Two radiograph of the left knee.    COMPARISON: No direct comparison available    FINDINGS:  There is no evidence of acute fracture or dislocation. Prior total knee   replacement. Atherosclerotic calcifications.      IMPRESSION:  No evidence of acute fracture or dislocation.    --- End of Report ---    < end of copied text >  < from: Xray Femur 2 Views, Left (03.23.23 @ 06:44) >    ACC: [id number] EXAM:  XR FEMUR 2 VIEWS LT   ORDERED BY: LAUREL SHELTON     PROCEDURE DATE:  03/23/2023          INTERPRETATION:  CLINICAL INDICATION:fall    TECHNIQUE: Three radiographs of the left femur.    COMPARISON: No direct comparison available    FINDINGS:  Diffuse osteopenia. Acute left femoral neck intertrochanteric comminuted   fracture    IMPRESSION:  Acute left femoral neck intertrochanteric comminuted fracture    --- End of Report ---        < end of copied text >

## 2023-03-25 NOTE — PHYSICAL THERAPY INITIAL EVALUATION ADULT - IMPAIRMENTS CONTRIBUTING IMPAIRED BED MOBILITY, REHAB EVAL
impaired balance/cognition/decreased flexibility/impaired motor control/abnormal muscle tone/pain/decreased strength

## 2023-03-25 NOTE — PHYSICAL THERAPY INITIAL EVALUATION ADULT - ADDITIONAL COMMENTS
Patient lives with  in house with steps to enter. Has live-in home attendant. As per son, patient was assisted in ADLs and ambulates with assistance using RW short distances only.

## 2023-03-26 LAB
ANION GAP SERPL CALC-SCNC: 8 MMOL/L — SIGNIFICANT CHANGE UP (ref 7–14)
BASOPHILS # BLD AUTO: 0.03 K/UL — SIGNIFICANT CHANGE UP (ref 0–0.2)
BASOPHILS NFR BLD AUTO: 0.3 % — SIGNIFICANT CHANGE UP (ref 0–1)
BUN SERPL-MCNC: 26 MG/DL — HIGH (ref 10–20)
CALCIUM SERPL-MCNC: 8.3 MG/DL — LOW (ref 8.4–10.5)
CHLORIDE SERPL-SCNC: 99 MMOL/L — SIGNIFICANT CHANGE UP (ref 98–110)
CO2 SERPL-SCNC: 30 MMOL/L — SIGNIFICANT CHANGE UP (ref 17–32)
CREAT SERPL-MCNC: 0.6 MG/DL — LOW (ref 0.7–1.5)
EGFR: 90 ML/MIN/1.73M2 — SIGNIFICANT CHANGE UP
EOSINOPHIL # BLD AUTO: 0.44 K/UL — SIGNIFICANT CHANGE UP (ref 0–0.7)
EOSINOPHIL NFR BLD AUTO: 4.4 % — SIGNIFICANT CHANGE UP (ref 0–8)
GLUCOSE SERPL-MCNC: 89 MG/DL — SIGNIFICANT CHANGE UP (ref 70–99)
HCT VFR BLD CALC: 28.8 % — LOW (ref 37–47)
HGB BLD-MCNC: 9.2 G/DL — LOW (ref 12–16)
IMM GRANULOCYTES NFR BLD AUTO: 0.5 % — HIGH (ref 0.1–0.3)
LYMPHOCYTES # BLD AUTO: 0.75 K/UL — LOW (ref 1.2–3.4)
LYMPHOCYTES # BLD AUTO: 7.5 % — LOW (ref 20.5–51.1)
MAGNESIUM SERPL-MCNC: 1.9 MG/DL — SIGNIFICANT CHANGE UP (ref 1.8–2.4)
MCHC RBC-ENTMCNC: 29.9 PG — SIGNIFICANT CHANGE UP (ref 27–31)
MCHC RBC-ENTMCNC: 31.9 G/DL — LOW (ref 32–37)
MCV RBC AUTO: 93.5 FL — SIGNIFICANT CHANGE UP (ref 81–99)
MONOCYTES # BLD AUTO: 0.82 K/UL — HIGH (ref 0.1–0.6)
MONOCYTES NFR BLD AUTO: 8.2 % — SIGNIFICANT CHANGE UP (ref 1.7–9.3)
NEUTROPHILS # BLD AUTO: 7.92 K/UL — HIGH (ref 1.4–6.5)
NEUTROPHILS NFR BLD AUTO: 79.1 % — HIGH (ref 42.2–75.2)
NRBC # BLD: 0 /100 WBCS — SIGNIFICANT CHANGE UP (ref 0–0)
PLATELET # BLD AUTO: 188 K/UL — SIGNIFICANT CHANGE UP (ref 130–400)
POTASSIUM SERPL-MCNC: 4.2 MMOL/L — SIGNIFICANT CHANGE UP (ref 3.5–5)
POTASSIUM SERPL-SCNC: 4.2 MMOL/L — SIGNIFICANT CHANGE UP (ref 3.5–5)
RBC # BLD: 3.08 M/UL — LOW (ref 4.2–5.4)
RBC # FLD: 13.2 % — SIGNIFICANT CHANGE UP (ref 11.5–14.5)
SODIUM SERPL-SCNC: 137 MMOL/L — SIGNIFICANT CHANGE UP (ref 135–146)
WBC # BLD: 10.01 K/UL — SIGNIFICANT CHANGE UP (ref 4.8–10.8)
WBC # FLD AUTO: 10.01 K/UL — SIGNIFICANT CHANGE UP (ref 4.8–10.8)

## 2023-03-26 PROCEDURE — 99233 SBSQ HOSP IP/OBS HIGH 50: CPT

## 2023-03-26 RX ADMIN — DRONEDARONE 400 MILLIGRAM(S): 400 TABLET, FILM COATED ORAL at 17:29

## 2023-03-26 RX ADMIN — Medication 50 MILLIGRAM(S): at 06:02

## 2023-03-26 RX ADMIN — DRONEDARONE 400 MILLIGRAM(S): 400 TABLET, FILM COATED ORAL at 06:02

## 2023-03-26 RX ADMIN — OXYCODONE HYDROCHLORIDE 10 MILLIGRAM(S): 5 TABLET ORAL at 18:33

## 2023-03-26 RX ADMIN — ENOXAPARIN SODIUM 40 MILLIGRAM(S): 100 INJECTION SUBCUTANEOUS at 10:54

## 2023-03-26 RX ADMIN — Medication 50 MICROGRAM(S): at 06:02

## 2023-03-26 NOTE — PROGRESS NOTE ADULT - ASSESSMENT
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 Pt demented at baseline and does not remember what happened. As per family (daughter via phone call) pt had unwitnessed fall from bed. C/o of LT hip pain, otherwise no other complaints. Mental state at baseline AAO1-2, currently at baseline. Denies LOC, head trauma, nausea, vomiting, abd pain, sob, difficulty breathing.    #Mechanical fall complicated by Acute minimally displaced left femoral intertrochanteric fracture with  avulsion of the lesser trochanter.  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

## 2023-03-27 LAB
ANION GAP SERPL CALC-SCNC: 6 MMOL/L — LOW (ref 7–14)
BASOPHILS # BLD AUTO: 0.02 K/UL — SIGNIFICANT CHANGE UP (ref 0–0.2)
BASOPHILS NFR BLD AUTO: 0.2 % — SIGNIFICANT CHANGE UP (ref 0–1)
BUN SERPL-MCNC: 17 MG/DL — SIGNIFICANT CHANGE UP (ref 10–20)
CALCIUM SERPL-MCNC: 8.1 MG/DL — LOW (ref 8.4–10.5)
CHLORIDE SERPL-SCNC: 103 MMOL/L — SIGNIFICANT CHANGE UP (ref 98–110)
CO2 SERPL-SCNC: 31 MMOL/L — SIGNIFICANT CHANGE UP (ref 17–32)
CREAT SERPL-MCNC: 0.5 MG/DL — LOW (ref 0.7–1.5)
EGFR: 94 ML/MIN/1.73M2 — SIGNIFICANT CHANGE UP
EOSINOPHIL # BLD AUTO: 0.36 K/UL — SIGNIFICANT CHANGE UP (ref 0–0.7)
EOSINOPHIL NFR BLD AUTO: 3.8 % — SIGNIFICANT CHANGE UP (ref 0–8)
GLUCOSE SERPL-MCNC: 97 MG/DL — SIGNIFICANT CHANGE UP (ref 70–99)
HCT VFR BLD CALC: 29.6 % — LOW (ref 37–47)
HGB BLD-MCNC: 9.6 G/DL — LOW (ref 12–16)
IMM GRANULOCYTES NFR BLD AUTO: 0.5 % — HIGH (ref 0.1–0.3)
LYMPHOCYTES # BLD AUTO: 0.45 K/UL — LOW (ref 1.2–3.4)
LYMPHOCYTES # BLD AUTO: 4.7 % — LOW (ref 20.5–51.1)
MAGNESIUM SERPL-MCNC: 1.9 MG/DL — SIGNIFICANT CHANGE UP (ref 1.8–2.4)
MCHC RBC-ENTMCNC: 30.3 PG — SIGNIFICANT CHANGE UP (ref 27–31)
MCHC RBC-ENTMCNC: 32.4 G/DL — SIGNIFICANT CHANGE UP (ref 32–37)
MCV RBC AUTO: 93.4 FL — SIGNIFICANT CHANGE UP (ref 81–99)
MONOCYTES # BLD AUTO: 0.65 K/UL — HIGH (ref 0.1–0.6)
MONOCYTES NFR BLD AUTO: 6.8 % — SIGNIFICANT CHANGE UP (ref 1.7–9.3)
NEUTROPHILS # BLD AUTO: 8.04 K/UL — HIGH (ref 1.4–6.5)
NEUTROPHILS NFR BLD AUTO: 84 % — HIGH (ref 42.2–75.2)
NRBC # BLD: 0 /100 WBCS — SIGNIFICANT CHANGE UP (ref 0–0)
PLATELET # BLD AUTO: 199 K/UL — SIGNIFICANT CHANGE UP (ref 130–400)
POTASSIUM SERPL-MCNC: 4.6 MMOL/L — SIGNIFICANT CHANGE UP (ref 3.5–5)
POTASSIUM SERPL-SCNC: 4.6 MMOL/L — SIGNIFICANT CHANGE UP (ref 3.5–5)
RBC # BLD: 3.17 M/UL — LOW (ref 4.2–5.4)
RBC # FLD: 13.2 % — SIGNIFICANT CHANGE UP (ref 11.5–14.5)
SARS-COV-2 RNA SPEC QL NAA+PROBE: SIGNIFICANT CHANGE UP
SODIUM SERPL-SCNC: 140 MMOL/L — SIGNIFICANT CHANGE UP (ref 135–146)
WBC # BLD: 9.57 K/UL — SIGNIFICANT CHANGE UP (ref 4.8–10.8)
WBC # FLD AUTO: 9.57 K/UL — SIGNIFICANT CHANGE UP (ref 4.8–10.8)

## 2023-03-27 PROCEDURE — 99232 SBSQ HOSP IP/OBS MODERATE 35: CPT

## 2023-03-27 RX ADMIN — ENOXAPARIN SODIUM 40 MILLIGRAM(S): 100 INJECTION SUBCUTANEOUS at 10:36

## 2023-03-27 RX ADMIN — DRONEDARONE 400 MILLIGRAM(S): 400 TABLET, FILM COATED ORAL at 17:19

## 2023-03-27 RX ADMIN — DRONEDARONE 400 MILLIGRAM(S): 400 TABLET, FILM COATED ORAL at 06:02

## 2023-03-27 RX ADMIN — Medication 50 MICROGRAM(S): at 06:02

## 2023-03-27 RX ADMIN — OXYCODONE HYDROCHLORIDE 10 MILLIGRAM(S): 5 TABLET ORAL at 21:27

## 2023-03-27 RX ADMIN — Medication 50 MILLIGRAM(S): at 06:02

## 2023-03-27 NOTE — PROGRESS NOTE ADULT - ASSESSMENT
81 y/o F Anxiety, Paroxysmal AFib on eliquis, Aortic Dissection s/p extensive covered stent placement in descending aorta , HTN, HLD, Hypothyroidism, Anxiety, Aortic Valve Replacement here after fall.     #Acute left femoral neck intertrochanteric comminuted fracture 2/2 fall  -above Noted on trauma w/u, rest of w/u with no other fractures  -Fall precautions  -Morphine prn for pain, currently controlled  -Ortho surgery >> Open reduction and internal fixation of left hip using trochanteric richie 24-Mar-2023  - PT rec STR on  >> f/u today  - Hb dropping since the admission >> monitor, keep active T&S    #Increase in size of descending aortic aneurysm   #Hx of aortic dissection with stent placement in descending aorta  -CT abdomen: Increased in size descending thoracic aorta/abdominal aortic aneurysm measuring up to 5.3 cm in diameter (4-261) at the diaphragmatic hiatus, previously 4.7 cm.  -Discussed with daughters at length, were already notified by ED of this finding, they already discussed it with pt's family doctor. They would not want any invasive procedures and understand the risks of this finding. They do not want vascular eval at this time given pt's already poor mental and functional status.     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 In retrospect likely present since at least 2021 though much less conspicuous due to different phase of contrast. Further evaluation can be considered as clinically warranted.  -Discussed with daughters, they do not want any invasive w/u including no biopsy. I instructed them that they can have routine active screening with ultrasound every few months if they wish.    #Paroxysmal afib  -Family self stopped eliquis a month ago  -c/w multaq   -c/w metoprolol     #Hypothyroid  -c/w synthroid    DVT ppx: lovenox   Diet: npo for surgery   Code Status: DNR/DNI-discussed with daughters Ewa (harriet is [facility]), [phone]. They are ok with rescinding DNR/DNI for the sake of the surgery.   Dispo: acute    [place]) PT f/u  2) monitor Hb  3) STR  placement?

## 2023-03-27 NOTE — PROGRESS NOTE ADULT - ASSESSMENT
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 Pt demented at baseline and does not remember what happened. As per family (daughter via phone call) pt had unwitnessed fall from bed. C/o of LT hip pain, otherwise no other complaints. Mental state at baseline AAO1-2, currently at baseline. Denies LOC, head trauma, nausea, vomiting, abd pain, sob, difficulty breathing.    #Mechanical fall complicated by Acute minimally displaced left femoral intertrochanteric fracture with  avulsion of the lesser trochanter.  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

## 2023-03-28 ENCOUNTER — TRANSCRIPTION ENCOUNTER (OUTPATIENT)
Age: 83
End: 2023-03-28

## 2023-03-28 VITALS
RESPIRATION RATE: 18 BRPM | HEART RATE: 72 BPM | SYSTOLIC BLOOD PRESSURE: 142 MMHG | TEMPERATURE: 99 F | OXYGEN SATURATION: 95 % | DIASTOLIC BLOOD PRESSURE: 65 MMHG

## 2023-03-28 LAB
ANION GAP SERPL CALC-SCNC: 10 MMOL/L — SIGNIFICANT CHANGE UP (ref 7–14)
BASOPHILS # BLD AUTO: 0.03 K/UL — SIGNIFICANT CHANGE UP (ref 0–0.2)
BASOPHILS NFR BLD AUTO: 0.3 % — SIGNIFICANT CHANGE UP (ref 0–1)
BLD GP AB SCN SERPL QL: SIGNIFICANT CHANGE UP
BUN SERPL-MCNC: 13 MG/DL — SIGNIFICANT CHANGE UP (ref 10–20)
CALCIUM SERPL-MCNC: 8.1 MG/DL — LOW (ref 8.4–10.5)
CHLORIDE SERPL-SCNC: 98 MMOL/L — SIGNIFICANT CHANGE UP (ref 98–110)
CO2 SERPL-SCNC: 28 MMOL/L — SIGNIFICANT CHANGE UP (ref 17–32)
CREAT SERPL-MCNC: 0.5 MG/DL — LOW (ref 0.7–1.5)
EGFR: 94 ML/MIN/1.73M2 — SIGNIFICANT CHANGE UP
EOSINOPHIL # BLD AUTO: 0.35 K/UL — SIGNIFICANT CHANGE UP (ref 0–0.7)
EOSINOPHIL NFR BLD AUTO: 3.4 % — SIGNIFICANT CHANGE UP (ref 0–8)
GLUCOSE SERPL-MCNC: 98 MG/DL — SIGNIFICANT CHANGE UP (ref 70–99)
HCT VFR BLD CALC: 31.5 % — LOW (ref 37–47)
HGB BLD-MCNC: 10 G/DL — LOW (ref 12–16)
IMM GRANULOCYTES NFR BLD AUTO: 0.5 % — HIGH (ref 0.1–0.3)
LYMPHOCYTES # BLD AUTO: 0.71 K/UL — LOW (ref 1.2–3.4)
LYMPHOCYTES # BLD AUTO: 6.9 % — LOW (ref 20.5–51.1)
MAGNESIUM SERPL-MCNC: 1.9 MG/DL — SIGNIFICANT CHANGE UP (ref 1.8–2.4)
MCHC RBC-ENTMCNC: 29.9 PG — SIGNIFICANT CHANGE UP (ref 27–31)
MCHC RBC-ENTMCNC: 31.7 G/DL — LOW (ref 32–37)
MCV RBC AUTO: 94.3 FL — SIGNIFICANT CHANGE UP (ref 81–99)
MONOCYTES # BLD AUTO: 0.82 K/UL — HIGH (ref 0.1–0.6)
MONOCYTES NFR BLD AUTO: 8 % — SIGNIFICANT CHANGE UP (ref 1.7–9.3)
NEUTROPHILS # BLD AUTO: 8.33 K/UL — HIGH (ref 1.4–6.5)
NEUTROPHILS NFR BLD AUTO: 80.9 % — HIGH (ref 42.2–75.2)
NRBC # BLD: 0 /100 WBCS — SIGNIFICANT CHANGE UP (ref 0–0)
PLATELET # BLD AUTO: 231 K/UL — SIGNIFICANT CHANGE UP (ref 130–400)
POTASSIUM SERPL-MCNC: 4 MMOL/L — SIGNIFICANT CHANGE UP (ref 3.5–5)
POTASSIUM SERPL-SCNC: 4 MMOL/L — SIGNIFICANT CHANGE UP (ref 3.5–5)
RBC # BLD: 3.34 M/UL — LOW (ref 4.2–5.4)
RBC # FLD: 13.4 % — SIGNIFICANT CHANGE UP (ref 11.5–14.5)
SODIUM SERPL-SCNC: 136 MMOL/L — SIGNIFICANT CHANGE UP (ref 135–146)
WBC # BLD: 10.29 K/UL — SIGNIFICANT CHANGE UP (ref 4.8–10.8)
WBC # FLD AUTO: 10.29 K/UL — SIGNIFICANT CHANGE UP (ref 4.8–10.8)

## 2023-03-28 PROCEDURE — 99233 SBSQ HOSP IP/OBS HIGH 50: CPT

## 2023-03-28 RX ORDER — APIXABAN 2.5 MG/1
2.5 TABLET, FILM COATED ORAL EVERY 12 HOURS
Refills: 0 | Status: DISCONTINUED | OUTPATIENT
Start: 2023-03-28 | End: 2023-03-28

## 2023-03-28 RX ORDER — APIXABAN 2.5 MG/1
1 TABLET, FILM COATED ORAL
Qty: 0 | Refills: 0 | DISCHARGE
Start: 2023-03-28

## 2023-03-28 RX ORDER — OXYCODONE HYDROCHLORIDE 5 MG/1
1 TABLET ORAL
Qty: 0 | Refills: 0 | DISCHARGE
Start: 2023-03-28 | End: 2023-04-03

## 2023-03-28 RX ADMIN — Medication 50 MILLIGRAM(S): at 05:35

## 2023-03-28 RX ADMIN — DRONEDARONE 400 MILLIGRAM(S): 400 TABLET, FILM COATED ORAL at 17:33

## 2023-03-28 RX ADMIN — DRONEDARONE 400 MILLIGRAM(S): 400 TABLET, FILM COATED ORAL at 05:33

## 2023-03-28 RX ADMIN — APIXABAN 2.5 MILLIGRAM(S): 2.5 TABLET, FILM COATED ORAL at 13:13

## 2023-03-28 RX ADMIN — Medication 50 MICROGRAM(S): at 05:33

## 2023-03-28 NOTE — DISCHARGE NOTE PROVIDER - CARE PROVIDER_API CALL
Yoshi Cardenas)  [facility]  [facility] [place] [address]  Phone: [phone]  Fax: [phone]  Follow Up Time:     Malinda Sanford)  Cardiovascular Disease; Internal Medicine  [address] [place] [address], [address]  [place] [address]  Phone: [phone]  Fax: [phone]  Follow Up Time:

## 2023-03-28 NOTE — DISCHARGE NOTE PROVIDER - CARE PROVIDERS DIRECT ADDRESSES
[email or web address]

## 2023-03-28 NOTE — PROGRESS NOTE ADULT - ASSESSMENT
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 Pt demented at baseline and does not remember what happened. As per family (daughter via phone call) pt had unwitnessed fall from bed. C/o of LT hip pain, otherwise no other complaints. Mental state at baseline AAO1-2, currently at baseline. Denies LOC, head trauma, nausea, vomiting, abd pain, sob, difficulty breathing.    #Mechanical fall complicated by Acute minimally displaced left femoral intertrochanteric fracture with  avulsion of the lesser trochanter.  sp operative intervention   pt     #Acute blood loss anemia secondary to operative blood loss   hemoglobin improving     #Anxiety     #Dementia     #Aortic aneurysm worsened- per vascular who discussed with family no intervention     #Hypothyroid on synthroid     #HTN   controlled  BP: 132/62 (28 Mar 2023 07:00) (128/60 - 132/62)    #left anterior fascicular block no intervention     #Chronic diastolic congestive heart failure stable     #Mild pulmonic valve regurgitation    #Osteopenia     PROGRESS NOTE HANDOFF    Pending:  PT rehab ,     Family discussion: family aware of plan of care    [phone]  9:35 AM 3/28/2023 Samantha Soria ,     Disposition: SNF

## 2023-03-28 NOTE — PROGRESS NOTE ADULT - PROVIDER SPECIALTY LIST ADULT
Orthopedics
Orthopedics
Physiatry
Hospitalist
Internal Medicine
Orthopedics
Hospitalist
Internal Medicine
Internal Medicine

## 2023-03-28 NOTE — PROGRESS NOTE ADULT - SUBJECTIVE AND OBJECTIVE BOX
DEANDRE MURPHY  82y  [facility] 4B 006 A      Patient is a 82y old  Female who presents with a chief complaint of s/p fall (23 Mar 2023 13:08)      INTERVAL HPI/OVERNIGHT EVENTS:      no acute events   REVIEW OF SYSTEMS:  CONSTITUTIONAL: No fever, weight loss, or fatigue  EYES: No eye pain, visual disturbances, or discharge  ENMT:  No difficulty hearing, tinnitus, vertigo; No sinus or throat pain  NECK: No pain or stiffness  BREASTS: No pain, masses, or nipple discharge  RESPIRATORY: No cough, wheezing, chills or hemoptysis; No shortness of breath  CARDIOVASCULAR: No chest pain, palpitations, dizziness, or leg swelling  GASTROINTESTINAL: No abdominal or epigastric pain. No nausea, vomiting, or hematemesis; No diarrhea or constipation. No melena or hematochezia.  GENITOURINARY: No dysuria, frequency, hematuria, or incontinence  NEUROLOGICAL: No headaches, memory loss, loss of strength, numbness, or tremors  SKIN: No itching, burning, rashes, or lesions   LYMPH NODES: No enlarged glands  ENDOCRINE: No heat or cold intolerance; No hair loss  MUSCULOSKELETAL: No joint pain or swelling; No muscle, back, or extremity pain  PSYCHIATRIC: No depression, anxiety, mood swings, or difficulty sleeping  HEME/LYMPH: No easy bruising, or bleeding gums  ALLERY AND IMMUNOLOGIC: No hives or eczema  FAMILY HISTORY:    T(C): 36.3 (03-24-23 @ 19:30), Max: 36.9 (03-24-23 @ 17:48)  HR: 80 (03-24-23 @ 20:15) (75 - 84)  BP: 155/71 (03-24-23 @ 20:15) (139/63 - 168/77)  RR: 16 (03-24-23 @ 20:15) (16 - 18)  SpO2: 97% (03-24-23 @ 20:15) (93% - 98%)  Wt(kg): --Vital Signs Last 24 Hrs  T(C): 36.3 (24 Mar 2023 19:30), Max: 36.9 (24 Mar 2023 17:48)  T(F): 97.3 (24 Mar 2023 19:30), Max: 98.4 (24 Mar 2023 17:48)  HR: 80 (24 Mar 2023 20:15) (75 - 84)  BP: 155/71 (24 Mar 2023 20:15) (139/63 - 168/77)  BP(mean): --  RR: 16 (24 Mar 2023 20:15) (16 - 18)  SpO2: 97% (24 Mar 2023 20:15) (93% - 98%)    Parameters below as of 24 Mar 2023 20:15  Patient On (Oxygen Delivery Method): room air        PHYSICAL EXAM:  GENERAL: NAD,    HEAD:  Atraumatic, Normocephalic  EYES: EOMI, PERRLA, conjunctiva and sclera clear  ENMT: No tonsillar erythema, exudates, or enlargement; Moist mucous membranes, Good dentition, No lesions  NECK: Supple, No JVD, Normal thyroid  NERVOUS SYSTEM:  Alert & Oriented X1  PULM: Clear to auscultation bilaterally  CARDIAC: Regular rate and rhythm; No murmurs, rubs, or gallops  GI: Soft, Nontender, Nondistended; Bowel sounds present  EXTREMITIES:  l hip operative site clean   LYMPH: No lymphadenopathy noted  SKIN: No rashes or lesions    Consultant(s) Notes Reviewed:  [x ] YES  [ ] NO  Care Discussed with Consultants/Other Providers [ x] YES  [ ] NO    LABS:                            10.1   13.28 )-----------( 183      ( 25 Mar 2023 06:43 )             32.1   03-25    141  |  100  |  24<H>  ----------------------------<  123<H>  4.3   |  27  |  0.7    Ca    8.5      25 Mar 2023 06:43  Mg     1.9     03-25    TPro  6.3  /  Alb  3.4<L>  /  TBili  0.4  /  DBili  x   /  AST  9   /  ALT  6   /  AlkPhos  63  03-24            acetaminophen   IVPB .. 650 milliGRAM(s) IV Intermittent once PRN  ceFAZolin   IVPB 2000 milliGRAM(s) IV Intermittent every 8 hours  dronedarone 400 milliGRAM(s) Oral two times a day  enoxaparin Injectable 40 milliGRAM(s) SubCutaneous every 24 hours  HYDROmorphone  Injectable 0.5 milliGRAM(s) IV Push every 10 minutes PRN  lactated ringers. 1000 milliLiter(s) IV Continuous <Continuous>  lactated ringers. 1000 milliLiter(s) IV Continuous <Continuous>  levothyroxine 50 MICROGram(s) Oral daily  metoprolol succinate ER 50 milliGRAM(s) Oral daily  morphine  - Injectable 4 milliGRAM(s) IV Push every 10 minutes PRN  morphine  - Injectable 2 milliGRAM(s) IV Push every 6 hours PRN  ondansetron Injectable 4 milliGRAM(s) IV Push once PRN  oxyCODONE    IR 10 milliGRAM(s) Oral every 6 hours PRN      HEALTH ISSUES - PROBLEM Dx:          Case Discussed with House Staff     Spectra [phone]  
  JEFFREYDEANDRE ALLEN  82y  [facility] 4B 006 A      Patient is a 82y old  Female who presents with a chief complaint of s/p fall (23 Mar 2023 13:08)      INTERVAL HPI/OVERNIGHT EVENTS:  no events overnight       REVIEW OF SYSTEMS:  CONSTITUTIONAL: No fever, weight loss, or fatigue  EYES: No eye pain, visual disturbances, or discharge  ENMT:  No difficulty hearing, tinnitus, vertigo; No sinus or throat pain  NECK: No pain or stiffness  BREASTS: No pain, masses, or nipple discharge  RESPIRATORY: No cough, wheezing, chills or hemoptysis; No shortness of breath  CARDIOVASCULAR: No chest pain, palpitations, dizziness, or leg swelling  GASTROINTESTINAL: No abdominal or epigastric pain. No nausea, vomiting, or hematemesis; No diarrhea or constipation. No melena or hematochezia.  GENITOURINARY: No dysuria, frequency, hematuria, or incontinence  NEUROLOGICAL: No headaches, memory loss, loss of strength, numbness, or tremors  SKIN: No itching, burning, rashes, or lesions   LYMPH NODES: No enlarged glands  ENDOCRINE: No heat or cold intolerance; No hair loss  MUSCULOSKELETAL: No joint pain or swelling; No muscle, back, or extremity pain  PSYCHIATRIC: No depression, anxiety, mood swings, or difficulty sleeping  HEME/LYMPH: No easy bruising, or bleeding gums  ALLERY AND IMMUNOLOGIC: No hives or eczema  FAMILY HISTORY:    T(C): 36.4 (03-26-23 @ 08:35), Max: 36.4 (03-26-23 @ 08:35)  HR: 77 (03-26-23 @ 08:35) (77 - 82)  BP: 127/60 (03-26-23 @ 08:35) (105/59 - 143/66)  RR: 18 (03-26-23 @ 08:35) (18 - 18)  SpO2: 92% (03-26-23 @ 00:11) (82% - 92%)  Wt(kg): --Vital Signs Last 24 Hrs  T(C): 36.4 (26 Mar 2023 08:35), Max: 36.4 (26 Mar 2023 08:35)  T(F): 97.6 (26 Mar 2023 08:35), Max: 97.6 (26 Mar 2023 08:35)  HR: 77 (26 Mar 2023 08:35) (77 - 82)  BP: 127/60 (26 Mar 2023 08:35) (105/59 - 143/66)  BP(mean): --  RR: 18 (26 Mar 2023 08:35) (18 - 18)  SpO2: 92% (26 Mar 2023 00:11) (82% - 92%)    Parameters below as of 26 Mar 2023 00:11  Patient On (Oxygen Delivery Method): room air        PHYSICAL EXAM:  GENERAL: NAD,    HEAD:  Atraumatic, Normocephalic  EYES: EOMI, PERRLA, conjunctiva and sclera clear  ENMT: No tonsillar erythema, exudates, or enlargement; Moist mucous membranes, Good dentition, No lesions  NECK: Supple, No JVD, Normal thyroid  NERVOUS SYSTEM:  Alert & Oriented x1   PULM: Clear to auscultation bilaterally  CARDIAC: Regular rate and rhythm; No murmurs, rubs, or gallops  GI: Soft, Nontender, Nondistended; Bowel sounds present  EXTREMITIES:  2+ Peripheral Pulses, No clubbing, cyanosis, or edema, clean site post procedure   LYMPH: No lymphadenopathy noted  SKIN: No rashes or lesions    Consultant(s) Notes Reviewed:  [x ] YES  [ ] NO  Care Discussed with Consultants/Other Providers [ x] YES  [ ] NO    LABS:                            9.2    10.01 )-----------( 188      ( 26 Mar 2023 05:45 )             28.8   03-26    137  |  99  |  26<H>  ----------------------------<  89  4.2   |  30  |  0.6<L>    Ca    8.3<L>      26 Mar 2023 05:45  Mg     1.9     03-26              dronedarone 400 milliGRAM(s) Oral two times a day  enoxaparin Injectable 40 milliGRAM(s) SubCutaneous every 24 hours  lactated ringers. 1000 milliLiter(s) IV Continuous <Continuous>  levothyroxine 50 MICROGram(s) Oral daily  metoprolol succinate ER 50 milliGRAM(s) Oral daily  morphine  - Injectable 2 milliGRAM(s) IV Push every 6 hours PRN  oxyCODONE    IR 10 milliGRAM(s) Oral every 6 hours PRN      HEALTH ISSUES - PROBLEM Dx:          Case Discussed with House Staff     Spectra [phone]  
DEANDRE MURPHY 82y Female  MRN#: [id number]   Hospital Day: 4d    SUBJECTIVE  Patient is a 82y old Female who presents with a chief complaint of s/p fall (23 Mar 2023 13:08)  Currently admitted to medicine with the primary diagnosis of Fracture, intertrochanteric, left femur      INTERVAL HPI AND OVERNIGHT EVENTS:  Patient was examined and seen at bedside. This morning she is resting comfortably in bed. No issues or overnight events.    OBJECTIVE  PAST MEDICAL & SURGICAL HISTORY  Hypertension    Hyperlipidemia    Hypothyroidism    No significant past surgical history      ALLERGIES:  No Known Allergies    MEDICATIONS:  STANDING MEDICATIONS  dronedarone 400 milliGRAM(s) Oral two times a day  enoxaparin Injectable 40 milliGRAM(s) SubCutaneous every 24 hours  lactated ringers. 1000 milliLiter(s) IV Continuous <Continuous>  levothyroxine 50 MICROGram(s) Oral daily  metoprolol succinate ER 50 milliGRAM(s) Oral daily    PRN MEDICATIONS  morphine  - Injectable 2 milliGRAM(s) IV Push every 6 hours PRN  oxyCODONE    IR 10 milliGRAM(s) Oral every 6 hours PRN      VITAL SIGNS: Last 24 Hours  T(C): 36.1 (26 Mar 2023 23:43), Max: 36.4 (26 Mar 2023 08:35)  T(F): 96.9 (26 Mar 2023 23:43), Max: 97.6 (26 Mar 2023 08:35)  HR: 74 (27 Mar 2023 05:00) (67 - 89)  BP: 121/60 (27 Mar 2023 05:00) (119/57 - 132/59)  BP(mean): --  RR: 18 (26 Mar 2023 23:43) (18 - 18)  SpO2: 92% (26 Mar 2023 16:04) (92% - 92%)    LABS:                        9.2    10.01 )-----------( 188      ( 26 Mar 2023 05:45 )             28.8     03-26    137  |  99  |  26<H>  ----------------------------<  89  4.2   |  30  |  0.6<L>    Ca    8.3<L>      26 Mar 2023 05:45  Mg     1.9     03-26                  Physical Exam:  CONSTITUTIONAL: No acute distress, alert and following commands, AxO=2  HEAD: Atraumatic, normocephalic  EYES: EOM intact, PERRLA, conjunctiva and sclera clear  PULMONARY: clear  CARDIOVASCULAR: Regular rate and rhythm  GASTROINTESTINAL: Soft, non-tender, non-distended; bowel sounds present  NEUROLOGY: non-focal  MUSK: left hip fracture  
Orthopaedic Surgery Postoperative Check Note    Procedure: Left hip IMN  EBL: 50cc    Pt S&E after above procedure. Pt resting comfortably. Pain well controlled. Denies f/c/cp/sob/n/v/d    Vitals:  T(C): 36.3 (03-24-23 @ 19:30), Max: 36.9 (03-24-23 @ 17:48)  HR: 82 (03-24-23 @ 20:00) (75 - 87)  BP: 154/74 (03-24-23 @ 20:00) (127/62 - 168/77)  RR: 16 (03-24-23 @ 20:00) (16 - 18)  SpO2: 98% (03-24-23 @ 20:00) (93% - 98%)    P/E:  NAD, Awake, alert  Nonlabored breathing    LLE  Dressing in place, c/d/i  Compartments soft/compressible, no pain w/ passive stretch  SILT sp/dp/t/sural/saph  Firing ta/ehl/fhl/gs  DP pulse 2+, foot wwp    Labs:                        11.2   9.93  )-----------( 184      ( 24 Mar 2023 08:18 )             35.3       A/P:   82F s/p left hip IMN for left IT fx on 3/24/2023, doing well    Weight bearing: WBAT LLE  AM labs  DVT ppx: lovenox, scds  Postop abx for 24 hrs: Ancef x24h  Diet  Pain control  Home medications  PT/OT/rehab  Please page Ortho w/ any questions/concerns  
Consult can be done on 3/24/23.  She is on bedrest awaiting surgery. 
DEANDRE MURPHY 82y Female  MRN#: [id number]   Hospital Day: 5d    SUBJECTIVE  Patient is a 82y old Female who presents with a chief complaint of s/p fall (23 Mar 2023 13:08)  Currently admitted to medicine with the primary diagnosis of Fracture, intertrochanteric, left femur      INTERVAL HPI AND OVERNIGHT EVENTS:  Patient was examined and seen at bedside. This morning she is resting comfortably in bed. No issues or overnight events.    OBJECTIVE  PAST MEDICAL & SURGICAL HISTORY  Hypertension    Hyperlipidemia    Hypothyroidism    No significant past surgical history      ALLERGIES:  No Known Allergies    MEDICATIONS:  STANDING MEDICATIONS  dronedarone 400 milliGRAM(s) Oral two times a day  enoxaparin Injectable 40 milliGRAM(s) SubCutaneous every 24 hours  lactated ringers. 1000 milliLiter(s) IV Continuous <Continuous>  levothyroxine 50 MICROGram(s) Oral daily  metoprolol succinate ER 50 milliGRAM(s) Oral daily    PRN MEDICATIONS  morphine  - Injectable 2 milliGRAM(s) IV Push every 6 hours PRN  oxyCODONE    IR 10 milliGRAM(s) Oral every 6 hours PRN      VITAL SIGNS: Last 24 Hours  T(C): 35.7 (28 Mar 2023 07:00), Max: 36.3 (27 Mar 2023 13:54)  T(F): 96.3 (28 Mar 2023 07:00), Max: 97.4 (27 Mar 2023 16:05)  HR: 72 (28 Mar 2023 07:00) (70 - 74)  BP: 132/62 (28 Mar 2023 07:00) (128/60 - 132/62)  BP(mean): --  RR: 18 (28 Mar 2023 07:00) (18 - 18)  SpO2: 95% (28 Mar 2023 07:00) (93% - 95%)    LABS:                        10.0   10.29 )-----------( 231      ( 28 Mar 2023 06:16 )             31.5     03-28    136  |  98  |  13  ----------------------------<  98  4.0   |  28  |  0.5<L>    Ca    8.1<L>      28 Mar 2023 06:16  Mg     1.9     03-28                  Physical Exam:  CONSTITUTIONAL: No acute distress, alert and following commands, AxO=2  HEAD: Atraumatic, normocephalic  EYES: EOM intact, PERRLA, conjunctiva and sclera clear  PULMONARY: clear  CARDIOVASCULAR: Regular rate and rhythm  GASTROINTESTINAL: Soft, non-tender, non-distended; bowel sounds present  NEUROLOGY: non-focal  MUSK: left hip fracture    
Orthopaedic Surgery Progress Note    S&E at bedside this AM. Pain well controlled. Denies fever/chills/CP/SOB. No other complaints      T(C): 35.4 (03-27-23 @ 07:00), Max: 36.1 (03-26-23 @ 23:43)  HR: 71 (03-27-23 @ 07:00) (67 - 89)  BP: 169/73 (03-27-23 @ 07:00) (119/57 - 169/73)  RR: 18 (03-27-23 @ 07:00) (18 - 18)  SpO2: 95% (03-27-23 @ 07:00) (92% - 95%)    P/E:  AOx3, NAD  Nonlabored breathing    LLE  Dressing in place, c/d/i  SILT sp/dp/t/sural/saph  Firing ta/ehl/fhl/gs  Foot WWP, 2+ DP pulse    Labs                        9.6    9.57  )-----------( 199      ( 27 Mar 2023 07:10 )             29.6     03-27    140  |  103  |  17  ----------------------------<  97  4.6   |  31  |  0.5<L>    Ca    8.1<L>      27 Mar 2023 07:10  Mg     1.9     03-27        A/P:   83yo Female s/p left hip ORIF POD#3 doing well     Weightbearing: WBAT LLE   DVT ppx: SCD, lvx  Abx: post op abx completed  Pain control  Home meds  Trend labs  PT/OT/Rehab  Dispo: Pending PT recommendations  Patient is clear for discharge from orthopedic standpoint, please d/c on dvt ppx for 30 days total from surgical date. Patient should follow up with dr. smith post operatively 2 weeks from date of surgery. please call [phone] for an appointment           
SUBJECTIVE: Patient seen and examined. Pain controlled.  Pt did well o/n.  No f/c/n/v/cp/sob.     OBJECTIVE:  NAD  Vital Signs Last 24 Hrs  T(C): 36.2 (26 Mar 2023 00:11), Max: 36.2 (26 Mar 2023 00:11)  T(F): 97.2 (26 Mar 2023 00:11), Max: 97.2 (26 Mar 2023 00:11)  HR: 82 (26 Mar 2023 06:01) (80 - 82)  BP: 143/66 (26 Mar 2023 06:01) (105/59 - 143/66)  BP(mean): --  RR: 18 (26 Mar 2023 00:11) (18 - 18)  SpO2: 92% (26 Mar 2023 00:11) (82% - 92%)    Parameters below as of 26 Mar 2023 00:11  Patient On (Oxygen Delivery Method): room air    P/E:  NAD, Awake, alert  Nonlabored breathing    LLE  Dressing in place, c/d/i  Compartments soft/compressible, no pain w/ passive stretch  SILT sp/dp/t/sural/saph  Firing ta/ehl/fhl/gs  DP pulse 2+, foot wwp                        9.2    10.01 )-----------( 188      ( 26 Mar 2023 05:45 )             28.8     03-26    137  |  99  |  26<H>  ----------------------------<  89  4.2   |  30  |  0.6<L>    Ca    8.3<L>      26 Mar 2023 05:45  Mg     1.9     03-26    TPro  6.3  /  Alb  3.4<L>  /  TBili  0.4  /  DBili  x   /  AST  9   /  ALT  6   /  AlkPhos  63  03-24    A/P :  82F s/p left hip IMN for left IT fx on 3/24/2023, POD2, doing well.    Weight bearing: WBAT LLE  AM labs  DVT ppx: lovenox, scds  Diet  Pain control  Home medications  PT/OT/rehab  Please page Ortho w/ any questions/concerns    Jospeh Menjivar MD  Orthopedic Surgery Resident PGY3
  DEANDRE MURPHY  82y  [facility]-N 4B 006 A      Patient is a 82y old  Female who presents with a chief complaint of s/p fall (23 Mar 2023 13:08)      INTERVAL HPI/OVERNIGHT EVENTS:      no acute events overnight   REVIEW OF SYSTEMS:  ROS neg   FAMILY HISTORY:    T(C): 35.4 (03-27-23 @ 07:00), Max: 36.1 (03-26-23 @ 23:43)  HR: 71 (03-27-23 @ 07:00) (67 - 89)  BP: 169/73 (03-27-23 @ 07:00) (119/57 - 169/73)  RR: 18 (03-27-23 @ 07:00) (18 - 18)  SpO2: 95% (03-27-23 @ 07:00) (92% - 95%)  Wt(kg): --Vital Signs Last 24 Hrs  T(C): 35.4 (27 Mar 2023 07:00), Max: 36.1 (26 Mar 2023 23:43)  T(F): 95.8 (27 Mar 2023 07:00), Max: 96.9 (26 Mar 2023 23:43)  HR: 71 (27 Mar 2023 07:00) (67 - 89)  BP: 169/73 (27 Mar 2023 07:00) (119/57 - 169/73)  BP(mean): --  RR: 18 (27 Mar 2023 07:00) (18 - 18)  SpO2: 95% (27 Mar 2023 07:00) (92% - 95%)    Parameters below as of 27 Mar 2023 07:00  Patient On (Oxygen Delivery Method): room air        PHYSICAL EXAM:  GENERAL: NAD, well-groomed, well-developed  HEAD:  Atraumatic, Normocephalic  EYES: EOMI, PERRLA, conjunctiva and sclera clear  ENMT: No tonsillar erythema, exudates, or enlargement; Moist mucous membranes, Good dentition, No lesions  NECK: Supple, No JVD, Normal thyroid  NERVOUS SYSTEM:  Alert & Oriented X2 ( Person and place)   PULM: Clear to auscultation bilaterally  CARDIAC: Regular rate and rhythm; No murmurs, rubs, or gallops  GI: Soft, Nontender, Nondistended; Bowel sounds present  EXTREMITIES:  2+ Peripheral Pulses, No clubbing, cyanosis, or edema, clean post op site   LYMPH: No lymphadenopathy noted  SKIN: No rashes or lesions    Consultant(s) Notes Reviewed:  [x ] YES  [ ] NO  Care Discussed with Consultants/Other Providers [ x] YES  [ ] NO    LABS:                            9.6    9.57  )-----------( 199      ( 27 Mar 2023 07:10 )             29.6   03-27    140  |  103  |  17  ----------------------------<  97  4.6   |  31  |  0.5<L>    Ca    8.1<L>      27 Mar 2023 07:10  Mg     1.9     03-27              dronedarone 400 milliGRAM(s) Oral two times a day  enoxaparin Injectable 40 milliGRAM(s) SubCutaneous every 24 hours  lactated ringers. 1000 milliLiter(s) IV Continuous <Continuous>  levothyroxine 50 MICROGram(s) Oral daily  metoprolol succinate ER 50 milliGRAM(s) Oral daily  morphine  - Injectable 2 milliGRAM(s) IV Push every 6 hours PRN  oxyCODONE    IR 10 milliGRAM(s) Oral every 6 hours PRN      HEALTH ISSUES - PROBLEM Dx:          Case Discussed with House Staff     Spectra [phone]  
  DEANDRE MURPHY  82y  [facility]-N 4B 006 A      Patient is a 82y old  Female who presents with a chief complaint of s/p fall (23 Mar 2023 13:08)      INTERVAL HPI/OVERNIGHT EVENTS:    no events overnight     ROS neg     T(C): 36.1 (03-24-23 @ 08:42), Max: 36.4 (03-24-23 @ 00:03)  HR: 78 (03-24-23 @ 08:42) (70 - 87)  BP: 146/65 (03-24-23 @ 08:42) (127/62 - 146/65)  RR: 18 (03-24-23 @ 08:42) (18 - 18)  SpO2: 94% (03-24-23 @ 00:03) (92% - 95%)  Wt(kg): --Vital Signs Last 24 Hrs  T(C): 36.1 (24 Mar 2023 08:42), Max: 36.4 (24 Mar 2023 00:03)  T(F): 97 (24 Mar 2023 08:42), Max: 97.6 (24 Mar 2023 00:03)  HR: 78 (24 Mar 2023 08:42) (70 - 87)  BP: 146/65 (24 Mar 2023 08:42) (127/62 - 146/65)  BP(mean): --  RR: 18 (24 Mar 2023 08:42) (18 - 18)  SpO2: 94% (24 Mar 2023 00:03) (92% - 95%)    Parameters below as of 24 Mar 2023 00:03  Patient On (Oxygen Delivery Method): room air        PHYSICAL EXAM:  GENERAL: NAD, well-groomed, well-developed  HEAD:  Atraumatic, Normocephalic  EYES: EOMI, PERRLA, conjunctiva and sclera clear  ENMT: No tonsillar erythema, exudates, or enlargement; Moist mucous membranes, Good dentition, No lesions  NECK: Supple, No JVD, Normal thyroid  NERVOUS SYSTEM:  Alert & Oriented X1  PULM: Clesystolic murmur   GI: Soft, Nontender, Nondistended; Bowel sounds present  EXTREMITIES:  2+ Peripheral Pulses, No clubbing, cyanosis, or edema, left lower extremity shortened and externally rotated   LYMPH: No lymphadenopathy noted  SKIN: No rashes or lesions    Consultant(s) Notes Reviewed:  [x ] YES  [ ] NO  Care Discussed with Consultants/Other Providers [ x] YES  [ ] NO    LABS:                            11.2   9.93  )-----------( 184      ( 24 Mar 2023 08:18 )             35.3   03-23    138  |  99  |  16  ----------------------------<  113<H>  4.4   |  31  |  0.6<L>    Ca    8.9      23 Mar 2023 05:45    TPro  7.1  /  Alb  3.6  /  TBili  0.3  /  DBili  x   /  AST  14  /  ALT  7   /  AlkPhos  70  03-23            dronedarone 400 milliGRAM(s) Oral two times a day  enoxaparin Injectable 40 milliGRAM(s) SubCutaneous every 24 hours  lactated ringers. 1000 milliLiter(s) IV Continuous <Continuous>  levothyroxine 50 MICROGram(s) Oral daily  metoprolol succinate ER 50 milliGRAM(s) Oral daily  morphine  - Injectable 2 milliGRAM(s) IV Push every 6 hours PRN      HEALTH ISSUES - PROBLEM Dx:          Case Discussed with House Staff   Spectra [phone]  
  DEANDRE MURPHY  82y  [facility]-N 4B 006 A      Patient is a 82y old  Female who presents with a chief complaint of s/p fall (23 Mar 2023 13:08)      INTERVAL HPI/OVERNIGHT EVENTS:  no events overnight       REVIEW OF SYSTEMS:  ROS neg     T(C): 35.7 (03-28-23 @ 07:00), Max: 36.3 (03-27-23 @ 13:54)  HR: 72 (03-28-23 @ 07:00) (70 - 74)  BP: 132/62 (03-28-23 @ 07:00) (128/60 - 132/62)  RR: 18 (03-28-23 @ 07:00) (18 - 18)  SpO2: 95% (03-28-23 @ 07:00) (93% - 95%)  Wt(kg): --Vital Signs Last 24 Hrs  T(C): 35.7 (28 Mar 2023 07:00), Max: 36.3 (27 Mar 2023 13:54)  T(F): 96.3 (28 Mar 2023 07:00), Max: 97.4 (27 Mar 2023 16:05)  HR: 72 (28 Mar 2023 07:00) (70 - 74)  BP: 132/62 (28 Mar 2023 07:00) (128/60 - 132/62)  BP(mean): --  RR: 18 (28 Mar 2023 07:00) (18 - 18)  SpO2: 95% (28 Mar 2023 07:00) (93% - 95%)    Parameters below as of 28 Mar 2023 07:00  Patient On (Oxygen Delivery Method): room air        PHYSICAL EXAM:  GENERAL: NAD, well-groomed, well-developed  HEAD:  Atraumatic, Normocephalic  EYES: EOMI, PERRLA, conjunctiva and sclera clear  ENMT: No tonsillar erythema, exudates, or enlargement; Moist mucous membranes, Good dentition, No lesions  NECK: Supple, No JVD, Normal thyroid  NERVOUS SYSTEM:  Alert & Oriented X2   PULM: Clear to auscultation bilaterally  CARDIAC: Regular rate and rhythm; No murmurs, rubs, or gallops  GI: Soft, Nontender, Nondistended; Bowel sounds present   EXTREMITIES:  2+ Peripheral Pulses, No clubbing, cyanosis, or edema, clean site of left hip   LYMPH: No lymphadenopathy noted  SKIN: No rashes or lesions    Consultant(s) Notes Reviewed:  [x ] YES  [ ] NO  Care Discussed with Consultants/Other Providers [ x] YES  [ ] NO    LABS:                            10.0   10.29 )-----------( 231      ( 28 Mar 2023 06:16 )             31.5   03-28    136  |  98  |  13  ----------------------------<  98  4.0   |  28  |  0.5<L>    Ca    8.1<L>      28 Mar 2023 06:16  Mg     1.9     03-28              dronedarone 400 milliGRAM(s) Oral two times a day  enoxaparin Injectable 40 milliGRAM(s) SubCutaneous every 24 hours  lactated ringers. 1000 milliLiter(s) IV Continuous <Continuous>  levothyroxine 50 MICROGram(s) Oral daily  metoprolol succinate ER 50 milliGRAM(s) Oral daily  morphine  - Injectable 2 milliGRAM(s) IV Push every 6 hours PRN  oxyCODONE    IR 10 milliGRAM(s) Oral every 6 hours PRN      HEALTH ISSUES - PROBLEM Dx:          Case Discussed with House Staff     Spectra [phone]  
DEANDRE MURPHY 82y Female  MRN#: [id number]   Hospital Day: 1d    SUBJECTIVE  Patient is a 82y old Female who presents with a chief complaint of s/p fall (23 Mar 2023 13:08)  Currently admitted to medicine with the primary diagnosis of Fracture, intertrochanteric, left femur      INTERVAL HPI AND OVERNIGHT EVENTS:  Patient was examined and seen at bedside. This morning she is resting comfortably in bed. No issues or overnight events.    OBJECTIVE  PAST MEDICAL & SURGICAL HISTORY  Hypertension    Hyperlipidemia    Hypothyroidism    No significant past surgical history      ALLERGIES:  No Known Allergies    MEDICATIONS:  STANDING MEDICATIONS  dronedarone 400 milliGRAM(s) Oral two times a day  enoxaparin Injectable 40 milliGRAM(s) SubCutaneous every 24 hours  lactated ringers. 1000 milliLiter(s) IV Continuous <Continuous>  levothyroxine 50 MICROGram(s) Oral daily  metoprolol succinate ER 50 milliGRAM(s) Oral daily    PRN MEDICATIONS  morphine  - Injectable 2 milliGRAM(s) IV Push every 6 hours PRN      VITAL SIGNS: Last 24 Hours  T(C): 36.1 (24 Mar 2023 08:42), Max: 36.4 (24 Mar 2023 00:03)  T(F): 97 (24 Mar 2023 08:42), Max: 97.6 (24 Mar 2023 00:03)  HR: 78 (24 Mar 2023 08:42) (70 - 87)  BP: 146/65 (24 Mar 2023 08:42) (127/62 - 146/65)  BP(mean): --  RR: 18 (24 Mar 2023 08:42) (18 - 18)  SpO2: 94% (24 Mar 2023 00:03) (92% - 95%)    LABS:                        11.2   9.93  )-----------( 184      ( 24 Mar 2023 08:18 )             35.3     03-24    140  |  102  |  21<H>  ----------------------------<  99  4.2   |  28  |  0.6<L>    Ca    8.7      24 Mar 2023 08:18  Mg     2.1     03-24    TPro  6.3  /  Alb  3.4<L>  /  TBili  0.4  /  DBili  x   /  AST  9   /  ALT  6   /  AlkPhos  63  03-24    PT/INR - ( 23 Mar 2023 05:45 )   PT: 12.50 sec;   INR: 1.09 ratio         PTT - ( 23 Mar 2023 05:45 )  PTT:30.6 sec          CARDIAC MARKERS ( 23 Mar 2023 05:45 )  x     / <0.01 ng/mL / x     / x     / x          Physical Exam:  CONSTITUTIONAL: No acute distress, alert and following commands, AxO=2  HEAD: Atraumatic, normocephalic  EYES: EOM intact, PERRLA, conjunctiva and sclera clear  PULMONARY: clear  CARDIOVASCULAR: Regular rate and rhythm  GASTROINTESTINAL: Soft, non-tender, non-distended; bowel sounds present  NEUROLOGY: non-focal  MUSK: left hip fracture

## 2023-03-28 NOTE — DISCHARGE NOTE PROVIDER - NSDCCPCAREPLAN_GEN_ALL_CORE_FT
PRINCIPAL DISCHARGE DIAGNOSIS  Diagnosis: Fracture, intertrochanteric, left femur  Assessment and Plan of Treatment: You came in with left hip fracture due to mechanical fall. Surgery was performed by orthopedics to fix the fracture. You will need extensive physical therapy to get back to the daily routine. Please be cautious when standing up, walking to avoid future falls. You should follow up with dr. Cardenas  (Orthopedics) after 10 days, please call [phone] for an appointment . Take all medications as prescribed.      SECONDARY DISCHARGE DIAGNOSES  Diagnosis: Fall  Assessment and Plan of Treatment:     Diagnosis: H/O abdominal aortic aneurysm  Assessment and Plan of Treatment: Your CT abdomen imaging was showing Increased in size descending thoracic aorta/abdominal aortic aneurysm measuring up to 5.3 cm in diameter (4-261) at the diaphragmatic hiatus, previously 4.7 cm.  Please follow up outpatient with your PCP.    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 In retrospect likely present since at least July 2021 though much less conspicuous due to different phase of contrast. Further evaluation can be considered as clinically warranted. Follow up outpatient with your PCP.

## 2023-03-28 NOTE — DISCHARGE NOTE NURSING/CASE MANAGEMENT/SOCIAL WORK - HISTORY OF COVID-19 VACCINATION
[facility] called stating patient needs a refill on losartan-hydrochlorothiazide and it's on back order.  Wanted to know if they can dispense the medication separately or substitute it for something else.  Please advise [phone]   Yes

## 2023-03-28 NOTE — DISCHARGE NOTE NURSING/CASE MANAGEMENT/SOCIAL WORK - PATIENT PORTAL LINK FT
You can access the FollowMyHealth Patient Portal offered by [facility] by registering at the following website: [email or web address]. By joining [employer]’s FollowMyHealth portal, you will also be able to view your health information using other applications (apps) compatible with our system.

## 2023-03-28 NOTE — PROGRESS NOTE ADULT - ASSESSMENT
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     #Acute left femoral neck intertrochanteric comminuted fracture 2/2 fall  -above Noted on trauma w/u, rest of w/u with no other fractures  -Fall precautions  -Morphine prn for pain, currently controlled  -Ortho surgery >> Open reduction and internal fixation of left hip using trochanteric richie 24-Mar-; stable for dc from orthopedics  - PT rec STR  - Hb trending up now after surgery    #Increase in size of descending aortic aneurysm   #Hx of aortic dissection with stent placement in descending aorta  -CT abdomen: Increased in size descending thoracic aorta/abdominal aortic aneurysm measuring up to 5.3 cm in diameter (4-261) at the diaphragmatic hiatus, previously 4.7 cm.  -Discussed with daughters at length, were already notified by ED of this finding, they already discussed it with pt's family doctor. They would not want any invasive procedures and understand the risks of this finding. They do not want vascular eval at this time given pt's already poor mental and functional status.     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 In retrospect likely present since at least 2021 though much less conspicuous due to different phase of contrast. Further evaluation can be considered as clinically warranted.  -Discussed with daughters, they do not want any invasive w/u including no biopsy. I instructed them that they can have routine active screening with ultrasound every few months if they wish.    #Paroxysmal afib  -Family self stopped eliquis a month ago  -c/w multaq   -c/w metoprolol     #Hypothyroid  -c/w synthroid    DVT ppx: lovenox   Diet: npo for surgery   Code Status: DNR/DNI-discussed with daughters Ewa (harriet is [facility]), [phone]. They are ok with rescinding DNR/DNI for the sake of the surgery.   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     #Acute left femoral neck intertrochanteric comminuted fracture 2/2 fall  -above Noted on trauma w/u, rest of w/u with no other fractures  -Fall precautions  -Morphine prn for pain, currently controlled  -Ortho surgery >> Open reduction and internal fixation of left hip using trochanteric richie 24-Mar-; stable for dc from orthopedics  - PT rec STR  - Hb trending up now after surgery    #Increase in size of descending aortic aneurysm   #Hx of aortic dissection with stent placement in descending aorta  -CT abdomen: Increased in size descending thoracic aorta/abdominal aortic aneurysm measuring up to 5.3 cm in diameter (4-261) at the diaphragmatic hiatus, previously 4.7 cm.  -Discussed with daughters at length, were already notified by ED of this finding, they already discussed it with pt's family doctor. They would not want any invasive procedures and understand the risks of this finding. They do not want vascular eval at this time given pt's already poor mental and functional status.     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 In retrospect likely present since at least 2021 though much less conspicuous due to different phase of contrast. Further evaluation can be considered as clinically warranted.  -Discussed with daughters, they do not want any invasive w/u including no biopsy. I instructed them that they can have routine active screening with ultrasound every few months if they wish.    #Paroxysmal afib  -Family self stopped eliquis a month ago  - restart ELiquis 2.5mg BID on  after speaking with ortho, they said AC needs to be held 48 hrs post-op and can be restarted after that  -c/w multaq   -c/w metoprolol     #Hypothyroid  -c/w synthroid    DVT ppx: lovenox   Diet: npo for surgery   Code Status: DNR/DNI-discussed with daughters Ewa (severopeggy is HCP), [phone]. They are ok with rescinding DNR/DNI for the sake of the surgery.   Dispo: STR    P[place]in) DC today to SNF if bed available, medically stable

## 2023-03-28 NOTE — DOWNTIME INTERRUPTION NOTE - WHICH MANUAL FORMS INITIATED?
See paper records for clinical information entered during [place] downtime
Downtime Recovery - See paper records for clinical information entered during [facility] downtime.

## 2023-03-28 NOTE — DISCHARGE NOTE PROVIDER - HOSPITAL COURSE
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 Pt demented at baseline and does not remember what happened. As per family (daughter via phone call) pt had unwitnessed fall from bed. C/o of LT hip pain, otherwise no other complaints. Mental state at baseline AAO1-2, currently at baseline. Denies LOC, head trauma, nausea, vomiting, abd pain, sob, difficulty breathing.    In the ED, trauma w/u as below, significant for acute left intertrochanteric fx, ortho onboard, planned for surgery later today.Pt given 2mg morphine in ED.    Acute minimally displaced left femoral intertrochanteric fracture with   avulsion of the lesser trochanter. No dislocation    Hospital course:  Pt was admitted for acute minimally displaced left femoral intertrochanteric fracture 2/2 mechanical fall. Ortho surgery  Open reduction and internal fixation of left hip using trochanteric richie was done on 24-Mar-2023. Pt Hb was stable. PT saw the pt and recommended STR for placement. Pt is medically stable for dc today and will need PT services.

## 2023-03-28 NOTE — DISCHARGE NOTE NURSING/CASE MANAGEMENT/SOCIAL WORK - NSDCPEFALRISK_GEN_ALL_CORE
For information on Fall & Injury Prevention, visit: [email or web address] OR  [email or web address] OR  https://www.cdc.gov/steadi/patient.html

## 2023-03-28 NOTE — DISCHARGE NOTE PROVIDER - NSDCMRMEDTOKEN_GEN_ALL_CORE_FT
dronedarone 400 mg oral tablet: 1 tab(s) orally 2 times a day  levothyroxine 50 mcg (0.05 mg) oral tablet: 1 tab(s) orally once a day  Metoprolol Succinate ER 50 mg oral tablet, extended release: 1 tab(s) orally once a day

## 2023-03-30 ENCOUNTER — TRANSCRIPTION ENCOUNTER (OUTPATIENT)
Age: 83
End: 2023-03-30

## 2023-03-31 NOTE — ED PROVIDER NOTE - NS ED MD DISPO ADMITTING SERVICE
DEIDRE FROM DR Vivek Herrera CT GUIDED BX OF LIVER NDL- hold blood thinners 3 days before procedure. MED

## 2023-04-01 DIAGNOSIS — F03.94 UNSPECIFIED DEMENTIA, UNSPECIFIED SEVERITY, WITH ANXIETY: ICD-10-CM

## 2023-04-01 DIAGNOSIS — J98.11 ATELECTASIS: ICD-10-CM

## 2023-04-01 DIAGNOSIS — F03.90 UNSPECIFIED DEMENTIA, UNSPECIFIED SEVERITY, WITHOUT BEHAVIORAL DISTURBANCE, PSYCHOTIC DISTURBANCE, MOOD DISTURBANCE, AND ANXIETY: ICD-10-CM

## 2023-04-01 DIAGNOSIS — I11.0 HYPERTENSIVE HEART DISEASE WITH HEART FAILURE: ICD-10-CM

## 2023-04-01 DIAGNOSIS — N28.89 OTHER SPECIFIED DISORDERS OF KIDNEY AND URETER: ICD-10-CM

## 2023-04-01 DIAGNOSIS — E78.5 HYPERLIPIDEMIA, UNSPECIFIED: ICD-10-CM

## 2023-04-01 DIAGNOSIS — I50.32 CHRONIC DIASTOLIC (CONGESTIVE) HEART FAILURE: ICD-10-CM
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
## 2023-04-01 DIAGNOSIS — I37.1 NONRHEUMATIC PULMONARY VALVE INSUFFICIENCY: ICD-10-CM

## 2023-04-01 DIAGNOSIS — F41.9 ANXIETY DISORDER, UNSPECIFIED: ICD-10-CM

## 2023-04-01 DIAGNOSIS — S72.142A DISPLACED INTERTROCHANTERIC FRACTURE OF LEFT FEMUR, INITIAL ENCOUNTER FOR CLOSED FRACTURE: ICD-10-CM

## 2023-04-01 DIAGNOSIS — M25.559 PAIN IN UNSPECIFIED HIP: ICD-10-CM

## 2023-04-01 DIAGNOSIS — I48.0 PAROXYSMAL ATRIAL FIBRILLATION: ICD-10-CM

## 2023-04-01 DIAGNOSIS — I44.4 LEFT ANTERIOR FASCICULAR BLOCK: ICD-10-CM

## 2023-04-01 DIAGNOSIS — M85.80 OTHER SPECIFIED DISORDERS OF BONE DENSITY AND STRUCTURE, UNSPECIFIED SITE: ICD-10-CM

## 2023-04-01 DIAGNOSIS — E03.9 HYPOTHYROIDISM, UNSPECIFIED: ICD-10-CM
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
## 2023-04-01 DIAGNOSIS — I71.9 AORTIC ANEURYSM OF UNSPECIFIED SITE, WITHOUT RUPTURE: ICD-10-CM

## 2023-04-01 DIAGNOSIS — D62 ACUTE POSTHEMORRHAGIC ANEMIA: ICD-10-CM

## 2023-05-04 ENCOUNTER — APPOINTMENT (OUTPATIENT)
Dept: ORTHOPEDIC SURGERY | Facility: CLINIC | Age: 83
End: 2023-05-04

## 2024-02-17 NOTE — PHYSICAL THERAPY INITIAL EVALUATION ADULT - BED MOBILITY LIMITATIONS, REHAB EVAL
Presents today without any prior episodes.  No history of smoking.  Most likely secondary to UTI.  Proven of hematuria with treatment of UTI, would recommend urology evaluation   decreased ability to use arms for pushing/pulling/impaired ability to control trunk for mobility

## 2024-03-19 NOTE — ED PROVIDER NOTE - ATTENDING CONTRIBUTION TO CARE
81-year-old female past medical history hypertension, hyperlipidemia, hypothyroidism presents with fall.  Mechanical trip and fall onto the sidewalk, patient landed on her right hip.  Patient complaining of right-sided hip and thigh pain.  No head trauma, no LOC, no blood thinner use.  No other complaints at this time.  Trauma alert called in triage.  No dizziness, no lightheadedness, no chest pain, no shortness of breath, no numbness/tingling, no palpitations, no vision changes, no head trauma, no upper extremity pain.    CONSTITUTIONAL: Well-developed; well-nourished; in no acute distress. Sitting up and providing appropriate history and physical examination  SKIN: skin exam is warm and dry, no acute rash.  HEAD: Normocephalic; atraumatic.  EYES: PERRL, 3 mm bilateral, no nystagmus, EOM intact; conjunctiva and sclera clear.  ENT: No nasal discharge; airway clear.  NECK: Supple; non tender. + full passive ROM in all directions. No JVD  CARD: S1, S2 normal; no murmurs, gallops, or rubs. Regular rate and rhythm. + Symmetric Strong Pulses  RESP: No wheezes, rales or rhonchi. Good air movement bilaterally  ABD: soft; non-distended; non-tender. No Rebound, No Guarding, No signs of peritonitis, No CVA tenderness. No pulsatile abdominal mass. + Strong and Symmetric Pulses  EXT: + TTP over right hip and thigh with swelling of right thigh.  No clubbing, cyanosis or edema. Dp and Pt Pulses intact. Cap refill less than 3 seconds  NEURO: CN 2-12 intact, normal finger to nose, normal romberg, no sensory or motor deficits, Alert, oriented, grossly unremarkable. No Focal deficits. GCS 15. NIH 0  PSYCH: Cooperative, appropriate.
Discharged

## 2024-11-07 NOTE — ED ADULT NURSE NOTE - NS ED NURSE DISCH DISPOSITION
[facility] INT [facility] ORTHOPAEDICS  [address] [place] [id number]  Dept: [phone]      Physical Therapy Daily Note     Referring MD: Duc Myers MD  Diagnosis:     ICD-10-CM    1. Acute pain of both knees  M25.561     M25.562       2. Muscle weakness  M62.81       3. Abnormality of gait  R26.9       4. Impaired functional mobility, balance, gait, and endurance  Z74.09       5. Pes anserinus tendinitis of both lower extremities [M76.891, M76.892]  M76.891     M76.892            Therapy precautions:Latex allergy  Co-morbidities affecting plan of care: s/p B TKA  Chief complaints/history of injury: Pt underwent R TKA 2013 with good recovery after surgery. Pt s/p L TKA 2019 through [facility] with persistent pain afterwards that did not improve with physical therapy, activity modification, Tylenol, and Celebrex. Pt sought a second opinion and saw Dr. Licea 5/2023. Ultrasound demonstrated small popliteal cyst and infection work up was negative. Pt referred to Dr. Myers and to Dr. Nam for possible nerve ablation of infrapatellar branch of the saphenous nerve. Pt saw Dr. Nam 8/1/24 with recommendation for possible L infrapatellar saphenous nerve block with trial of left IPS peripheral nerve stimulator if it helps. Pt saw Dr. Myers 8/12/24 with clinical impression of B pes tendinitis. She was referred to PT for LE strengthening with focus on hamstrings and consideration of dry needling at pes tendons.  Describe current symptoms: Pain behind both knee caps, feels like grit in the knees with sit-stand, L leg feels weaker and R leg has become weaker due to decreased activity, knees feel unstable, ear of falling. Pt also with new onset lateral L knee pain that  started ~2-3 weeks ago and is slowly improving  Patient Stated Goals: feel more stable when walking, decrease pain    Payor: Payor: [employer]ARE /  /  /  Billing pattern: Government- total time    Total Timed Procedure Codes: 40  Admitted

## 2025-03-07 NOTE — DISCHARGE NOTE PROVIDER - DISCHARGE DATE
28-Mar-2023
[Time Spent: ___ minutes] : I have spent [unfilled] minutes of time on the encounter which excludes teaching and separately reported services.